# Patient Record
Sex: FEMALE | Race: BLACK OR AFRICAN AMERICAN | NOT HISPANIC OR LATINO | Employment: UNEMPLOYED | URBAN - METROPOLITAN AREA
[De-identification: names, ages, dates, MRNs, and addresses within clinical notes are randomized per-mention and may not be internally consistent; named-entity substitution may affect disease eponyms.]

---

## 2020-11-19 PROCEDURE — 99285 EMERGENCY DEPT VISIT HI MDM: CPT | Mod: 25 | Performed by: EMERGENCY MEDICINE

## 2020-11-20 ENCOUNTER — APPOINTMENT (OUTPATIENT)
Dept: GENERAL RADIOLOGY | Facility: CLINIC | Age: 30
End: 2020-11-20
Attending: EMERGENCY MEDICINE
Payer: MEDICARE

## 2020-11-20 ENCOUNTER — HOSPITAL ENCOUNTER (EMERGENCY)
Facility: CLINIC | Age: 30
Discharge: HOME | End: 2020-11-20
Attending: EMERGENCY MEDICINE | Admitting: EMERGENCY MEDICINE
Payer: MEDICARE

## 2020-11-20 ENCOUNTER — APPOINTMENT (OUTPATIENT)
Dept: CT IMAGING | Facility: CLINIC | Age: 30
End: 2020-11-20
Attending: EMERGENCY MEDICINE
Payer: MEDICARE

## 2020-11-20 ENCOUNTER — TRANSCRIBE ORDERS (OUTPATIENT)
Facility: CLINIC | Age: 30
End: 2020-11-20

## 2020-11-20 ENCOUNTER — PATIENT OUTREACH (OUTPATIENT)
Dept: ONCOLOGY | Facility: CLINIC | Age: 30
End: 2020-11-20

## 2020-11-20 VITALS
SYSTOLIC BLOOD PRESSURE: 130 MMHG | WEIGHT: 170 LBS | HEART RATE: 70 BPM | TEMPERATURE: 97.3 F | OXYGEN SATURATION: 98 % | DIASTOLIC BLOOD PRESSURE: 79 MMHG | RESPIRATION RATE: 18 BRPM

## 2020-11-20 DIAGNOSIS — R59.0 MEDIASTINAL LYMPHADENOPATHY: Primary | ICD-10-CM

## 2020-11-20 DIAGNOSIS — R05.9 COUGH: ICD-10-CM

## 2020-11-20 DIAGNOSIS — R93.89 ABNORMAL CHEST CT: Primary | ICD-10-CM

## 2020-11-20 DIAGNOSIS — Z32.01 PREGNANCY TEST POSITIVE: ICD-10-CM

## 2020-11-20 LAB
ALBUMIN SERPL-MCNC: 3.7 G/DL (ref 3.4–5)
ALP SERPL-CCNC: 65 U/L (ref 40–150)
ALT SERPL W P-5'-P-CCNC: 24 U/L (ref 0–50)
ANION GAP SERPL CALCULATED.3IONS-SCNC: 8 MMOL/L (ref 3–14)
AST SERPL W P-5'-P-CCNC: 12 U/L (ref 0–45)
B-HCG SERPL-ACNC: 957 IU/L (ref 0–5)
BASOPHILS # BLD AUTO: 0.1 10E9/L (ref 0–0.2)
BASOPHILS NFR BLD AUTO: 0.9 %
BILIRUB SERPL-MCNC: 0.3 MG/DL (ref 0.2–1.3)
BUN SERPL-MCNC: 10 MG/DL (ref 7–30)
CALCIUM SERPL-MCNC: 8.8 MG/DL (ref 8.5–10.1)
CHLORIDE SERPL-SCNC: 105 MMOL/L (ref 94–109)
CO2 SERPL-SCNC: 24 MMOL/L (ref 20–32)
CREAT SERPL-MCNC: 0.72 MG/DL (ref 0.52–1.04)
DIFFERENTIAL METHOD BLD: ABNORMAL
EOSINOPHIL # BLD AUTO: 0.4 10E9/L (ref 0–0.7)
EOSINOPHIL NFR BLD AUTO: 7.6 %
ERYTHROCYTE [DISTWIDTH] IN BLOOD BY AUTOMATED COUNT: 15 % (ref 10–15)
GFR SERPL CREATININE-BSD FRML MDRD: >90 ML/MIN/{1.73_M2}
GLUCOSE SERPL-MCNC: 85 MG/DL (ref 70–99)
HCG SERPL QL: POSITIVE
HCT VFR BLD AUTO: 36.8 % (ref 35–47)
HGB BLD-MCNC: 11.6 G/DL (ref 11.7–15.7)
IMM GRANULOCYTES # BLD: 0 10E9/L (ref 0–0.4)
IMM GRANULOCYTES NFR BLD: 0.2 %
INTERPRETATION ECG - MUSE: NORMAL
LYMPHOCYTES # BLD AUTO: 2 10E9/L (ref 0.8–5.3)
LYMPHOCYTES NFR BLD AUTO: 36.6 %
MCH RBC QN AUTO: 28 PG (ref 26.5–33)
MCHC RBC AUTO-ENTMCNC: 31.5 G/DL (ref 31.5–36.5)
MCV RBC AUTO: 89 FL (ref 78–100)
MONOCYTES # BLD AUTO: 0.5 10E9/L (ref 0–1.3)
MONOCYTES NFR BLD AUTO: 8.5 %
NEUTROPHILS # BLD AUTO: 2.6 10E9/L (ref 1.6–8.3)
NEUTROPHILS NFR BLD AUTO: 46.2 %
NRBC # BLD AUTO: 0 10*3/UL
NRBC BLD AUTO-RTO: 0 /100
PLATELET # BLD AUTO: 404 10E9/L (ref 150–450)
POTASSIUM SERPL-SCNC: 3.6 MMOL/L (ref 3.4–5.3)
PROT SERPL-MCNC: 7.4 G/DL (ref 6.8–8.8)
RBC # BLD AUTO: 4.15 10E12/L (ref 3.8–5.2)
SODIUM SERPL-SCNC: 136 MMOL/L (ref 133–144)
WBC # BLD AUTO: 5.5 10E9/L (ref 4–11)

## 2020-11-20 PROCEDURE — 80053 COMPREHEN METABOLIC PANEL: CPT | Performed by: EMERGENCY MEDICINE

## 2020-11-20 PROCEDURE — 250N000011 HC RX IP 250 OP 636: Performed by: EMERGENCY MEDICINE

## 2020-11-20 PROCEDURE — 71275 CT ANGIOGRAPHY CHEST: CPT | Mod: 26 | Performed by: RADIOLOGY

## 2020-11-20 PROCEDURE — 250N000009 HC RX 250: Performed by: EMERGENCY MEDICINE

## 2020-11-20 PROCEDURE — 71045 X-RAY EXAM CHEST 1 VIEW: CPT

## 2020-11-20 PROCEDURE — 84703 CHORIONIC GONADOTROPIN ASSAY: CPT | Performed by: EMERGENCY MEDICINE

## 2020-11-20 PROCEDURE — 71045 X-RAY EXAM CHEST 1 VIEW: CPT | Mod: 26 | Performed by: RADIOLOGY

## 2020-11-20 PROCEDURE — 85025 COMPLETE CBC W/AUTO DIFF WBC: CPT | Performed by: EMERGENCY MEDICINE

## 2020-11-20 PROCEDURE — 99284 EMERGENCY DEPT VISIT MOD MDM: CPT | Performed by: EMERGENCY MEDICINE

## 2020-11-20 PROCEDURE — 71275 CT ANGIOGRAPHY CHEST: CPT

## 2020-11-20 PROCEDURE — 84702 CHORIONIC GONADOTROPIN TEST: CPT | Performed by: EMERGENCY MEDICINE

## 2020-11-20 PROCEDURE — 93005 ELECTROCARDIOGRAM TRACING: CPT | Performed by: EMERGENCY MEDICINE

## 2020-11-20 RX ORDER — BENZONATATE 200 MG/1
200 CAPSULE ORAL 3 TIMES DAILY PRN
Qty: 21 CAPSULE | Refills: 0 | Status: ON HOLD | OUTPATIENT
Start: 2020-11-20 | End: 2020-11-25

## 2020-11-20 RX ORDER — IOPAMIDOL 755 MG/ML
70 INJECTION, SOLUTION INTRAVASCULAR ONCE
Status: COMPLETED | OUTPATIENT
Start: 2020-11-20 | End: 2020-11-20

## 2020-11-20 RX ORDER — ALBUTEROL SULFATE 90 UG/1
2 AEROSOL, METERED RESPIRATORY (INHALATION) EVERY 6 HOURS PRN
Qty: 1 INHALER | Refills: 0 | Status: SHIPPED | OUTPATIENT
Start: 2020-11-20

## 2020-11-20 RX ADMIN — SODIUM CHLORIDE, PRESERVATIVE FREE 90 ML: 5 INJECTION INTRAVENOUS at 04:16

## 2020-11-20 RX ADMIN — IOPAMIDOL 70 ML: 755 INJECTION, SOLUTION INTRAVENOUS at 04:16

## 2020-11-20 ASSESSMENT — ENCOUNTER SYMPTOMS
VOMITING: 0
FEVER: 0
CHEST TIGHTNESS: 0
COUGH: 1
DYSURIA: 0
DIARRHEA: 0
WEAKNESS: 0
RHINORRHEA: 0
SHORTNESS OF BREATH: 0
BRUISES/BLEEDS EASILY: 0
BACK PAIN: 0
CHILLS: 0
NAUSEA: 0
CONFUSION: 0

## 2020-11-20 NOTE — ED PROVIDER NOTES
"    [place] EMERGENCY DEPARTMENT ([facility])  November 20, 2020 Vertical Triage A 12:53 AM   History     Chief Complaint   Patient presents with     Shortness of Breath     The history is provided by the patient and medical records.     Juventino Dickerson is a 29 year old female who presents with recurrent cough since 11/1/20. The cough is occasionally productive of saliva but no thick sputum. She has shortness of breath while having coughing fits, no shortness of breath at rest. No fevers or chills. She has chest pain with long coughing fits, none at rest. No nausea, vomiting, diarrhea. No abdominal pain. She has had rhinorrhea and sneezing. No COVID-19 testing. She denies any acute concerns. She has tried [place] cough drops, and over-the-counter cough medications such as DayQuil and Nyquil without relief.  This cough seems different than her typical cough and that it has been lasting for such a long time. She is a smoker. No cocaine, marijuana or meth use. She smokes 3 packs a day, has always smoked this heavily. She has been trying to stay indoors and has been quarantining for the past 18 days but continues to have symptoms without improvement.    Patient states that she was seen at [facility] for this cough, was given a \"spray\" for her mouth which helped her symptoms temporarily but no medications were prescribed at discharge.  Attempt to obtain [facility] records was unsuccessful, not just for [facility] but anything, [facility], [facility], even [place] or [facility].  Attempted to contact [facility]'s after hours medical records line to obtain care your ID number, they were not open.      PAST MEDICAL HISTORY: History reviewed. No pertinent past medical history.    PAST SURGICAL HISTORY: History reviewed. No pertinent surgical history.    Past medical history, past surgical history, medications, and allergies were reviewed with the patient. Additional pertinent items: None    FAMILY " HISTORY: No family history on file.    SOCIAL HISTORY:   Social History     Tobacco Use     Smoking status: Not on file   Substance Use Topics     Alcohol use: Not on file     Social history was reviewed with the patient. Additional pertinent items: None      Patient's Medications    No medications on file        No Known Allergies     Review of Systems   Constitutional: Negative for chills and fever.   HENT: Negative for rhinorrhea.    Respiratory: Positive for cough. Negative for chest tightness and shortness of breath (No shortness of breath at rest).    Cardiovascular: Negative for chest pain.   Gastrointestinal: Negative for diarrhea, nausea and vomiting.   Genitourinary: Negative for dysuria.   Musculoskeletal: Negative for back pain.   Allergic/Immunologic: Negative for immunocompromised state.   Neurological: Negative for weakness.   Hematological: Does not bruise/bleed easily.   Psychiatric/Behavioral: Negative for confusion.   All other systems reviewed and are negative.    Physical Exam   BP: 130/79  Pulse: 70  Temp: 97.3  F (36.3  C)  Resp: 16  Weight: 77.1 kg (170 lb)  SpO2: 98 %      Physical Exam  General: Afebrile, no acute distress   HEENT: Normocephalic, atraumatic, conjunctivae normal. MMM  Neck: non-tender, supple  Cardio: regular rate. regular rhythm   Resp: Normal work of breathing, no respiratory distress, lungs clear bilaterally, no wheezing, rhonchi, rales  Chest/Back: no visual signs of trauma, no CVA tenderness   Abdomen: soft, non distension, no tenderness, no peritoneal signs   Neuro: alert and fully oriented. CN II-XII grossly intact. Grossly normal strength and sensation in all extremities.   MSK: no deformities. Normal range of motion  Integumentary/Skin: no rash visualized, normal color  Psych: normal affect, normal behavior    ED Course        Procedures    No results found for this or any previous visit (from the past 24 hour(s)).  Medications - No data to display     Assessments &  Plan (with Medical Decision Making)     Juventino Dickerson is a 29 year old female who presents with persistent cough x nearly 3 weeks' duration.  On arrival patient is well-appearing, afebrile, no distress.  Lungs clear auscultation bilaterally.  Patient is nontachycardic, no hypoxia, no respiratory distress.  Patient reports mild chest pain and shortness of breath that is associated with long coughing episodes.    Differential diagnosis includes but is not limited to bronchitis versus pneumonia versus viral illness versus COVID-19 versus chronic cough versus influenza among others.    At this time will obtain chest x-ray, Covid swab, and reevaluate.   Patient does not want to have a COVID test.  Patient states that she has self quarantine for the past 19 days.  I reviewed CXR which demonstrates Indeterminate fullness in the hilar regions bilaterally. Recommend follow-up chest CT to exclude underlying mass or adenopathy. Will obtain CT scan. Patient signed out to morning provider pending CT chest.     I have reviewed the nursing notes.    I have reviewed the findings, diagnosis, plan and need for follow up with the patient.    New Prescriptions    No medications on file       Final diagnoses:   Cough     ZAHEER, Selena Lockwood, am serving as a trained medical scribe to document services personally performed by Dyan Yen MD based on the provider's statements to me on November 20, 2020.  This document has been checked and approved by the attending provider.    IDyan MD, was physically present and have reviewed and verified the accuracy of this note documented by Selena Lockwood medical scribe.     11/19/2020   [facility] EMERGENCY DEPARTMENT     Dyan Yen MD  11/20/20 0149

## 2020-11-20 NOTE — ED TRIAGE NOTES
"Patient awake and alert. Respirations even and unlabored. Skin warm and dry. Reports shortness of breath when having \"coughing spells\" since 11-1, seen at [facility] 4 days ago given a \"spray in the ER which helped for a little bit\". No acute distress noted at this time. Patient reports having chest pain during coughing spells.  "

## 2020-11-20 NOTE — PROGRESS NOTES
New Patient Oncology Nurse Navigator Note     Referring provider:   Rafita Bojorquez DO [facility]  [facility] EMERGENCY DEPT     Referral Placed: November 20, 2020     Evaluation for : R59.0 (ICD-10-CM) - Mediastinal lymphadenopathy     Clinical History (per Nurse review of records provided):    See ED visit 11/19 CT chest    Clinical Assessment / Barriers to Care (Per Nurse):  OUTGOING CALLS to pt:  Introduced my role as nurse navigator with [facility] and that we have recd the referral for dx of abnormal CT findings from  in ED  Explained to pt that she will receive a call from our scheduling intake team and provided our call-back number below if needed.   Juventino live with her 2 kids, 2 yr old and 8 yr old. Relying on mom for emotional and logistical support right now, okays us discussing PHI with her mother, Odalys Beckwith [phone]. Demographics updated.  I advised pt and her mother that I have reviewed her imaging and the ED notes.  She was not given anything for her cough (tessalon perles and inhaler prescriptions printed but not given to pt) and asks what to take for this  We discussed that there is no diagnosis yet, that CT looks suspicious for lymphoma and in addition to biopsy a complete work up usually includes lab, imaging and other testing in order to make a dx and guide tx.  We also discussed that lymphoma is treatable and in many cases curable. Juventino is scared.  I advised if her cough or sx worsen to go back to ED and I am working on oupt consult date/time asap  Pt voiced understanding of above instructions and information and denied further questions    Records Location: [facility]     Referral updates and Plan:   November 20, 2020 calls to pt.  - above  [phone] called Juventino back to let her know that I have reviewd her case with Dr Stout who recommend covid test asap as well as OB appt. Jolene thinks she is 4 weeks pregnant. She states she has  been at home for 20 days and questions need for covid testing. I explained it will   Symptomatic (cough) covid test ordered per Dr Stout and pt notified to schedule asap by calling [phone]. Pt states she will schedule covid test and an appt with her OB asap. She voiced understanding of above instructions and information and denied further questions    Becka Felton, RN, BSN, OCN  RN Care Coordinator / Oncology Nurse Navigator   [facility]  [phone]

## 2020-11-20 NOTE — DISCHARGE INSTRUCTIONS
Your chest CT showed enlarged lymph nodes.  This could represent lymphoma which is a type of a cancer.  A referral for an oncology doctor has made and made for you.  They should contact you to set up an appointment.  If you do not hear from them please contact them to set up your own appointment at  Please make an appointment to follow up with Hematology Oncology Clinic (phone: [phone]) as soon as possible even if entirely better.    Please make an appointment to follow up with OB/Gyn [facility] (phone: [phone]) as soon as possible

## 2020-11-20 NOTE — ED NOTES
Discussed risks and benefits of CT scan while pregnant with the patient.  As there is suspicion of possible mediastinal mass on chest x-ray patient did consent to CT scan.     Rafita Bojorquez,   11/20/20 0346

## 2020-11-20 NOTE — ED NOTES
Emergency Department Patient Sign-out       Brief HPI:  This is a 29 year old female signed out to me by Dr. Yara Yen.  See initial ED Provider note for details of the presentation.         Exam:   Patient Vitals for the past 24 hrs:   BP Temp Temp src Pulse Resp SpO2 Weight   11/19/20 2359 -- -- -- -- -- -- 77.1 kg (170 lb)   11/19/20 2357 130/79 97.3  F (36.3  C) Temporal 70 16 98 % --           ED RESULTS:   Results for orders placed or performed during the hospital encounter of 11/20/20 (from the past 24 hour(s))   EKG 12 lead     Status: None (Preliminary result)    Collection Time: 11/20/20  1:03 AM   Result Value Ref Range    Interpretation ECG Click View Image link to view waveform and result    XR Chest Port 1 View     Status: None    Collection Time: 11/20/20  1:29 AM    Narrative    EXAM: XR CHEST PORT 1 VW  LOCATION: Health system  DATE/TIME: 11/20/2020 1:24 AM    INDICATION: Shortness of breath.    COMPARISON: None.    FINDINGS: Prominence in the hilar regions bilaterally. This could be due to pulmonary arterial enlargement, however underlying lesion or adenopathy not excluded. Given this appearance, CT is recommended for further evaluation. Heart size within normal   limits. No acute infiltrates or consolidation. No significant bony abnormalities.      Impression    IMPRESSION: Indeterminate fullness in the hilar regions bilaterally. Recommend follow-up chest CT to exclude underlying mass or adenopathy.   CBC with platelets differential     Status: Abnormal    Collection Time: 11/20/20  1:56 AM   Result Value Ref Range    WBC 5.5 4.0 - 11.0 10e9/L    RBC Count 4.15 3.8 - 5.2 10e12/L    Hemoglobin 11.6 (L) 11.7 - 15.7 g/dL    Hematocrit 36.8 35.0 - 47.0 %    MCV 89 78 - 100 fl    MCH 28.0 26.5 - 33.0 pg    MCHC 31.5 31.5 - 36.5 g/dL    RDW 15.0 10.0 - 15.0 %    Platelet Count 404 150 - 450 10e9/L    Diff Method Automated Method     % Neutrophils 46.2 %    % Lymphocytes 36.6 %     % Monocytes 8.5 %    % Eosinophils 7.6 %    % Basophils 0.9 %    % Immature Granulocytes 0.2 %    Nucleated RBCs 0 0 /100    Absolute Neutrophil 2.6 1.6 - 8.3 10e9/L    Absolute Lymphocytes 2.0 0.8 - 5.3 10e9/L    Absolute Monocytes 0.5 0.0 - 1.3 10e9/L    Absolute Eosinophils 0.4 0.0 - 0.7 10e9/L    Absolute Basophils 0.1 0.0 - 0.2 10e9/L    Abs Immature Granulocytes 0.0 0 - 0.4 10e9/L    Absolute Nucleated RBC 0.0    Comprehensive metabolic panel     Status: None    Collection Time: 11/20/20  1:56 AM   Result Value Ref Range    Sodium 136 133 - 144 mmol/L    Potassium 3.6 3.4 - 5.3 mmol/L    Chloride 105 94 - 109 mmol/L    Carbon Dioxide 24 20 - 32 mmol/L    Anion Gap 8 3 - 14 mmol/L    Glucose 85 70 - 99 mg/dL    Urea Nitrogen 10 7 - 30 mg/dL    Creatinine 0.72 0.52 - 1.04 mg/dL    GFR Estimate >90 >60 mL/min/[1.73_m2]    GFR Estimate If Black >90 >60 mL/min/[1.73_m2]    Calcium 8.8 8.5 - 10.1 mg/dL    Bilirubin Total 0.3 0.2 - 1.3 mg/dL    Albumin 3.7 3.4 - 5.0 g/dL    Protein Total 7.4 6.8 - 8.8 g/dL    Alkaline Phosphatase 65 40 - 150 U/L    ALT 24 0 - 50 U/L    AST 12 0 - 45 U/L   HCG qualitative pregnancy (blood)     Status: Abnormal    Collection Time: 11/20/20  1:56 AM   Result Value Ref Range    HCG Qualitative Serum Positive (A) NEG^Negative   HCG quantitative pregnancy     Status: Abnormal    Collection Time: 11/20/20  1:56 AM   Result Value Ref Range    HCG Quantitative Serum 957 (H) 0 - 5 IU/L   CT Chest Pulmonary Embolism w Contrast     Status: None    Collection Time: 11/20/20  4:18 AM    Narrative    EXAM: CT CHEST PULMONARY EMBOLISM W CONTRAST  LOCATION: [facility]  DATE/TIME: 11/20/2020 4:13 AM    INDICATION: Shortness of breath.    COMPARISON: None.    TECHNIQUE: CT chest pulmonary angiogram during arterial phase injection of IV contrast. Multiplanar reformats and MIP reconstructions were performed. Dose reduction techniques were used.     CONTRAST: 70 mL Isovue 370.      FINDINGS:  ANGIOGRAM CHEST: Pulmonary arteries are normal caliber and negative for pulmonary emboli. Thoracic aorta is negative for dissection. The heart size is normal.    LUNGS AND PLEURA: Normal.    MEDIASTINUM/AXILLAE: There are multiple enlarged mediastinal and bilateral hilar lymph nodes. A precarinal lymph node mass measures approximately 3.3 x 3.8 cm. The superior vena cava is compressed anteriorly, but remains patent. Bilateral main bronchi   are encased by lymph node disease. There is no axillary lymph node enlargement. The heart size is normal.    UPPER ABDOMEN: No acute upper abdominal abnormality.    MUSCULOSKELETAL: Normal.      Impression    IMPRESSION:  1.  There is no pulmonary embolus, aortic aneurysm or dissection.    2.  Multiple enlarged mediastinal and bilateral hilar lymph nodes. Lymphoma is a possibility.         ED MEDICATIONS:   Medications   iopamidol (ISOVUE-370) solution 70 mL (70 mLs Intravenous Given 11/20/20 0416)   sodium chloride 0.9 % bag 500mL for CT scan flush use (90 mLs Intravenous Given 11/20/20 0416)     Medical decision making: Patient was signed out to me pending chest x-ray.  Chest x-ray was suspicious for enlarged mediastinum.  CT scan of the patient's chest showed mediastinal lymphadenopathy which could represent lymphoma.  I discussed this with heme-onc fellow.  Did not recommend any further testing at this time but since the patient was stable they recommended a referral for outpatient heme-onc appointment be made for further work-up of this and assessment of possible lymphoma.  Patient is also pregnant.  She is experiencing no abdominal discomfort.  Patient was given the number for OB/GYN so she may set up her home prenatal visit.  Discussed the patient's care and the plan with the patient as well as her mother.  All questions were answered and she understands the plan.    Impression:    ICD-10-CM    1. Mediastinal lymphadenopathy  R59.0 Oncology/Hematology Adult  Referral   2. Cough  R05 CBC with platelets differential     Comprehensive metabolic panel     HCG qualitative pregnancy (blood)     HCG quantitative pregnancy     HCG quantitative pregnancy     CANCELED: HCG quantitative pregnancy   3. Pregnancy test positive  Z32.01        Disposition: Discharge    DO Reno Johnson, Rafita Alan DO  11/20/20 0456

## 2020-11-20 NOTE — ED AVS SNAPSHOT
[facility] Emergency Department  [address] [address]  Phone: [phone]                                    Juventino Dickerson   MRN: [id number]    Department: [facility] Emergency Department   Date of Visit: 11/19/2020           After Visit Summary Signature Page    I have received my discharge instructions, and my questions have been answered. I have discussed any challenges I see with this plan with the nurse or doctor.    ..........................................................................................................................................  Patient/Patient Representative Signature      ..........................................................................................................................................  Patient Representative Print Name and Relationship to Patient    ..................................................               ................................................  Date                                   Time    ..........................................................................................................................................  Reviewed by Signature/Title    ...................................................              ..............................................  Date                                               Time          22EPIC Rev 08/18

## 2020-11-20 NOTE — TELEPHONE ENCOUNTER
Oncology/Surgical Oncology Referral Request:     Specialty Requested: Medical Oncology     Referring Provider: Rafita Bojorquez DO    Referring Clinic/Organization: [facility]    Records location: [facility]     Requested Provider (if specified): Not Specified

## 2020-11-21 ENCOUNTER — NURSE TRIAGE (OUTPATIENT)
Dept: NURSING | Facility: CLINIC | Age: 30
End: 2020-11-21

## 2020-11-21 NOTE — TELEPHONE ENCOUNTER
Was in ER 11-20-20 and was supposed to get a written prescription for Albuterol Inhaler and Tessalon Pearls, which are listed in patient's AVS.  Patient calling to say she never received the written prescriptions but would like to pick them up    Patient would like to use [facility] in [place], [place] [address]  Patient's prescriptions called in to pharmacy 11-21-20.    If patient's symptoms worsen, will go back in to ER per ER recommendations.    Shawnee Renner RN  [facility]       Additional Information    Caller has medication question only, adult not sick, and triager answers question    Protocols used: MEDICATION QUESTION CALL-A-

## 2020-11-23 ENCOUNTER — PATIENT OUTREACH (OUTPATIENT)
Dept: ONCOLOGY | Facility: CLINIC | Age: 30
End: 2020-11-23

## 2020-11-23 ENCOUNTER — HOSPITAL ENCOUNTER (OUTPATIENT)
Facility: CLINIC | Age: 30
Setting detail: OBSERVATION
Discharge: HOME | End: 2020-11-25
Attending: INTERNAL MEDICINE | Admitting: PHYSICIAN ASSISTANT
Payer: MEDICARE

## 2020-11-23 ENCOUNTER — APPOINTMENT (OUTPATIENT)
Dept: ULTRASOUND IMAGING | Facility: CLINIC | Age: 30
End: 2020-11-23
Attending: PHYSICIAN ASSISTANT
Payer: MEDICARE

## 2020-11-23 DIAGNOSIS — J98.59 MEDIASTINAL MASS: ICD-10-CM

## 2020-11-23 DIAGNOSIS — Z32.01 PREGNANCY TEST POSITIVE: Primary | ICD-10-CM

## 2020-11-23 DIAGNOSIS — R59.0 LYMPHADENOPATHY, MEDIASTINAL: ICD-10-CM

## 2020-11-23 LAB
ALBUMIN SERPL-MCNC: 4 G/DL (ref 3.4–5)
ALP SERPL-CCNC: 75 U/L (ref 40–150)
ALT SERPL W P-5'-P-CCNC: 28 U/L (ref 0–50)
ANION GAP SERPL CALCULATED.3IONS-SCNC: 5 MMOL/L (ref 3–14)
APTT PPP: 33 SEC (ref 22–37)
AST SERPL W P-5'-P-CCNC: 16 U/L (ref 0–45)
B-HCG SERPL-ACNC: 6321 IU/L (ref 0–5)
BASOPHILS # BLD AUTO: 0.1 10E9/L (ref 0–0.2)
BASOPHILS NFR BLD AUTO: 1 %
BILIRUB SERPL-MCNC: 0.5 MG/DL (ref 0.2–1.3)
BUN SERPL-MCNC: 9 MG/DL (ref 7–30)
CALCIUM SERPL-MCNC: 9 MG/DL (ref 8.5–10.1)
CHLORIDE SERPL-SCNC: 107 MMOL/L (ref 94–109)
CO2 SERPL-SCNC: 23 MMOL/L (ref 20–32)
CREAT SERPL-MCNC: 0.73 MG/DL (ref 0.52–1.04)
DIFFERENTIAL METHOD BLD: NORMAL
EOSINOPHIL # BLD AUTO: 0.2 10E9/L (ref 0–0.7)
EOSINOPHIL NFR BLD AUTO: 4 %
ERYTHROCYTE [DISTWIDTH] IN BLOOD BY AUTOMATED COUNT: 14.8 % (ref 10–15)
FIBRINOGEN PPP-MCNC: 456 MG/DL (ref 200–420)
GFR SERPL CREATININE-BSD FRML MDRD: >90 ML/MIN/{1.73_M2}
GLUCOSE SERPL-MCNC: 73 MG/DL (ref 70–99)
HCT VFR BLD AUTO: 39.3 % (ref 35–47)
HGB BLD-MCNC: 12.6 G/DL (ref 11.7–15.7)
IMM GRANULOCYTES # BLD: 0 10E9/L (ref 0–0.4)
IMM GRANULOCYTES NFR BLD: 0.2 %
INR PPP: 1.06 (ref 0.86–1.14)
LABORATORY COMMENT REPORT: NORMAL
LDH SERPL L TO P-CCNC: 188 U/L (ref 81–234)
LYMPHOCYTES # BLD AUTO: 1.4 10E9/L (ref 0.8–5.3)
LYMPHOCYTES NFR BLD AUTO: 26.5 %
MAGNESIUM SERPL-MCNC: 2 MG/DL (ref 1.6–2.3)
MCH RBC QN AUTO: 28.5 PG (ref 26.5–33)
MCHC RBC AUTO-ENTMCNC: 32.1 G/DL (ref 31.5–36.5)
MCV RBC AUTO: 89 FL (ref 78–100)
MONOCYTES # BLD AUTO: 0.5 10E9/L (ref 0–1.3)
MONOCYTES NFR BLD AUTO: 9.6 %
NEUTROPHILS # BLD AUTO: 3.1 10E9/L (ref 1.6–8.3)
NEUTROPHILS NFR BLD AUTO: 58.7 %
NRBC # BLD AUTO: 0 10*3/UL
NRBC BLD AUTO-RTO: 0 /100
PHOSPHATE SERPL-MCNC: 2.1 MG/DL (ref 2.5–4.5)
PHOSPHATE SERPL-MCNC: 2.2 MG/DL (ref 2.5–4.5)
PLATELET # BLD AUTO: 447 10E9/L (ref 150–450)
POTASSIUM SERPL-SCNC: 3.7 MMOL/L (ref 3.4–5.3)
PROT SERPL-MCNC: 8 G/DL (ref 6.8–8.8)
RBC # BLD AUTO: 4.42 10E12/L (ref 3.8–5.2)
SARS-COV-2 RNA SPEC QL NAA+PROBE: NEGATIVE
SARS-COV-2 RNA SPEC QL NAA+PROBE: NORMAL
SODIUM SERPL-SCNC: 135 MMOL/L (ref 133–144)
SPECIMEN SOURCE: NORMAL
SPECIMEN SOURCE: NORMAL
URATE SERPL-MCNC: 2.8 MG/DL (ref 2.6–6)
WBC # BLD AUTO: 5.2 10E9/L (ref 4–11)

## 2020-11-23 PROCEDURE — 84550 ASSAY OF BLOOD/URIC ACID: CPT | Performed by: PHYSICIAN ASSISTANT

## 2020-11-23 PROCEDURE — 87340 HEPATITIS B SURFACE AG IA: CPT | Mod: GZ | Performed by: PHYSICIAN ASSISTANT

## 2020-11-23 PROCEDURE — 85384 FIBRINOGEN ACTIVITY: CPT | Performed by: PHYSICIAN ASSISTANT

## 2020-11-23 PROCEDURE — 80053 COMPREHEN METABOLIC PANEL: CPT | Performed by: PHYSICIAN ASSISTANT

## 2020-11-23 PROCEDURE — 85025 COMPLETE CBC W/AUTO DIFF WBC: CPT | Performed by: PHYSICIAN ASSISTANT

## 2020-11-23 PROCEDURE — 120N000002 HC R&B MED SURG/OB UMMC

## 2020-11-23 PROCEDURE — U0003 INFECTIOUS AGENT DETECTION BY NUCLEIC ACID (DNA OR RNA); SEVERE ACUTE RESPIRATORY SYNDROME CORONAVIRUS 2 (SARS-COV-2) (CORONAVIRUS DISEASE [COVID-19]), AMPLIFIED PROBE TECHNIQUE, MAKING USE OF HIGH THROUGHPUT TECHNOLOGIES AS DESCRIBED BY CMS-2020-01-R: HCPCS | Performed by: PHYSICIAN ASSISTANT

## 2020-11-23 PROCEDURE — 86706 HEP B SURFACE ANTIBODY: CPT | Performed by: PHYSICIAN ASSISTANT

## 2020-11-23 PROCEDURE — 999N000127 HC STATISTIC PERIPHERAL IV START W US GUIDANCE

## 2020-11-23 PROCEDURE — 84100 ASSAY OF PHOSPHORUS: CPT | Performed by: INTERNAL MEDICINE

## 2020-11-23 PROCEDURE — 76801 OB US < 14 WKS SINGLE FETUS: CPT | Mod: 26 | Performed by: RADIOLOGY

## 2020-11-23 PROCEDURE — 258N000003 HC RX IP 258 OP 636: Performed by: INTERNAL MEDICINE

## 2020-11-23 PROCEDURE — 36415 COLL VENOUS BLD VENIPUNCTURE: CPT | Performed by: INTERNAL MEDICINE

## 2020-11-23 PROCEDURE — 85730 THROMBOPLASTIN TIME PARTIAL: CPT | Performed by: PHYSICIAN ASSISTANT

## 2020-11-23 PROCEDURE — 76801 OB US < 14 WKS SINGLE FETUS: CPT

## 2020-11-23 PROCEDURE — 84100 ASSAY OF PHOSPHORUS: CPT | Performed by: PHYSICIAN ASSISTANT

## 2020-11-23 PROCEDURE — 84702 CHORIONIC GONADOTROPIN TEST: CPT | Performed by: INTERNAL MEDICINE

## 2020-11-23 PROCEDURE — 86704 HEP B CORE ANTIBODY TOTAL: CPT | Performed by: PHYSICIAN ASSISTANT

## 2020-11-23 PROCEDURE — 250N000013 HC RX MED GY IP 250 OP 250 PS 637: Performed by: PHYSICIAN ASSISTANT

## 2020-11-23 PROCEDURE — 36415 COLL VENOUS BLD VENIPUNCTURE: CPT | Performed by: PHYSICIAN ASSISTANT

## 2020-11-23 PROCEDURE — 87389 HIV-1 AG W/HIV-1&-2 AB AG IA: CPT | Performed by: PHYSICIAN ASSISTANT

## 2020-11-23 PROCEDURE — 86695 HERPES SIMPLEX TYPE 1 TEST: CPT | Performed by: PHYSICIAN ASSISTANT

## 2020-11-23 PROCEDURE — 99252 IP/OBS CONSLTJ NEW/EST SF 35: CPT | Performed by: OBSTETRICS & GYNECOLOGY

## 2020-11-23 PROCEDURE — 86696 HERPES SIMPLEX TYPE 2 TEST: CPT | Performed by: PHYSICIAN ASSISTANT

## 2020-11-23 PROCEDURE — G0378 HOSPITAL OBSERVATION PER HR: HCPCS

## 2020-11-23 PROCEDURE — 83735 ASSAY OF MAGNESIUM: CPT | Performed by: PHYSICIAN ASSISTANT

## 2020-11-23 PROCEDURE — 99219 PR INITIAL OBSERVATION CARE,LEVEL II: CPT | Performed by: INTERNAL MEDICINE

## 2020-11-23 PROCEDURE — 76817 TRANSVAGINAL US OBSTETRIC: CPT | Mod: 26 | Performed by: RADIOLOGY

## 2020-11-23 PROCEDURE — 83615 LACTATE (LD) (LDH) ENZYME: CPT | Performed by: PHYSICIAN ASSISTANT

## 2020-11-23 PROCEDURE — 250N000009 HC RX 250: Performed by: INTERNAL MEDICINE

## 2020-11-23 PROCEDURE — 85610 PROTHROMBIN TIME: CPT | Performed by: PHYSICIAN ASSISTANT

## 2020-11-23 RX ORDER — AMOXICILLIN 250 MG
1 CAPSULE ORAL 2 TIMES DAILY PRN
Status: DISCONTINUED | OUTPATIENT
Start: 2020-11-23 | End: 2020-11-25 | Stop reason: HOSPADM

## 2020-11-23 RX ORDER — BENZONATATE 100 MG/1
200 CAPSULE ORAL 3 TIMES DAILY PRN
Status: DISCONTINUED | OUTPATIENT
Start: 2020-11-23 | End: 2020-11-25 | Stop reason: HOSPADM

## 2020-11-23 RX ORDER — GUAIFENESIN/DEXTROMETHORPHAN 100-10MG/5
5 SYRUP ORAL EVERY 4 HOURS PRN
Status: DISCONTINUED | OUTPATIENT
Start: 2020-11-23 | End: 2020-11-24

## 2020-11-23 RX ORDER — ACETAMINOPHEN 325 MG/1
650 TABLET ORAL EVERY 4 HOURS PRN
Status: DISCONTINUED | OUTPATIENT
Start: 2020-11-23 | End: 2020-11-25 | Stop reason: HOSPADM

## 2020-11-23 RX ORDER — ALBUTEROL SULFATE 90 UG/1
2 AEROSOL, METERED RESPIRATORY (INHALATION) EVERY 6 HOURS PRN
Status: DISCONTINUED | OUTPATIENT
Start: 2020-11-23 | End: 2020-11-25 | Stop reason: HOSPADM

## 2020-11-23 RX ORDER — AMOXICILLIN 250 MG
2 CAPSULE ORAL 2 TIMES DAILY PRN
Status: DISCONTINUED | OUTPATIENT
Start: 2020-11-23 | End: 2020-11-25 | Stop reason: HOSPADM

## 2020-11-23 RX ADMIN — BENZONATATE 200 MG: 100 CAPSULE ORAL at 17:18

## 2020-11-23 RX ADMIN — SODIUM PHOSPHATE, MONOBASIC, MONOHYDRATE 9 MMOL: 276; 142 INJECTION, SOLUTION INTRAVENOUS at 17:18

## 2020-11-23 RX ADMIN — BENZONATATE 200 MG: 100 CAPSULE ORAL at 22:41

## 2020-11-23 ASSESSMENT — ACTIVITIES OF DAILY LIVING (ADL)
ADLS_ACUITY_SCORE: 14
ADLS_ACUITY_SCORE: 14

## 2020-11-23 ASSESSMENT — MIFFLIN-ST. JEOR: SCORE: 1438.95

## 2020-11-23 NOTE — H&P
[facility]     History and Physical  Hematology / Oncology     Date of Admission:  11/23/2020  Date of Service (when I saw the patient): 11/23/20    Assessment & Plan   Juventino Dickerson is a 29 year old female who presents with no significant past medical history, early term pregnancy, and nicotine use (3 ppd). Admitted for expedited work up of mediastinal mass.      ONC   # Mediastinal mass   # Cough - improving   Patient was seen in the ED on 11/20/20 for complaint of recurrent cough and SOB since 11/1/20. In the ED Chest X-ray 11/20 showed prominence in hilar regions bilaterally. CT was recommended for further evaluation. CT Chest 11/20 showed no PE, AA or dissection. Multiple enlarged mediastinal and bilateral hilar lymph nodes. Concern for lymphoma. She was direct admitted to the hospital 11/23/20 for expedited work up. COVID neg on admission.    - US of neck today, NPO at [place] for abdominal ultrasound to assess extent of disease.   - Working to arrange biopsy of mediastinal mass.   - Intervention Pulm consulted, appreciate recs.   - Ordered Hepatitis B labs, HSV 1/2, HIV - pending   - TLS/DIC daily    OBYGN   # Early-Term Pregnancy   Patient was found to be pregnant when she was seen in the ED on 11/20. HCG Quant 11/20 957. Per patient she is 5 week pregnant.  - OB consulted, appreciate recs. Recheck quantitative HCG 6321 on 11/23.   - Transvaginal ultrasound assess viability of pregnancy    MISC   # Nicotine Use   Patient reports 3 ppd smoking history since the age of 20 yo. She quit 11/1/20 because it was making her cough worse  - Patient declines nicotine replacement at this time.     FEN:  -Encourage PO fluids   -PRN lyte replacement   - # Hypophosphatemia. Phos 2.2 on admission, replace per protocol  -RDAT - NPO at [place] for Abdominal US and possible biopsy.     Prophy/Misc:  -VTE: Hold ppx Lovenox in anticipation for biopsy   -GI/PUD: None   -Bowels: PRN Senna    -Social: Updated patient's mom over the phone. She has 2 children, ages 2 and 9. Her mom is taking care of her kids while she is inpt.   This patient was discussed with Dr. Leatha Shelton PA-C  Hematology/Oncology  Pager # [phone]  Phone # [phone]     Code Status   Full Code - confirmed with patient on admission     Primary Care Physician   Physician No Ref-Primary    Chief Complaint   Admission for work up of mediastinal mass.     History is obtained from the patient    History of Present Illness   Juventino Dickerson is a 29 year old female who presents with new mediastinal and hilar lymphadenopathy. She started having a frequent hacking dry cough ~ 11/1/20. She had episodes of post-tussive emesis and instances where she was out of breath from coughing so hard. She said she tried everything to try to get rid of the cough but nothing was working. She eventually stopped smoking because it exacerbated her cough. Her cough is now very mild and intermittent, nearly resolved. Endorses mild musculoskeletal right upper chest discomfort, worse with movement and coughing. Not reproducible to palpation. This seems to be resolving as well.   She denies shortness of breath, nasal congestion, or postnasal drip. No recent headaches, vision changes, light-headedness, or dizziness. For the past week she has woken up with drenching night sweats- will sweat through her shirt. Also feeling less hungry, gets full after 1 meal which is new for her. She recalls having the night sweats with previous pregnancies, but not early satiety. She denies weight loss.   Per Juventino, she thinks she is 5 weeks pregnant. She has a 2 and 9 year old at home.   A comprehensive review of systems was obtained and is negative other than noted here or in the HPI.      Past Medical History    I have reviewed this patient's medical history and updated it with pertinent information if needed.   No past medical history on file.    Past Surgical  History   I have reviewed this patient's surgical history and updated it with pertinent information if needed.  No past surgical history on file.    Prior to Admission Medications   Prior to Admission Medications   Prescriptions Last Dose Informant Patient Reported? Taking?   albuterol (PROAIR HFA/PROVENTIL HFA/VENTOLIN HFA) 108 (90 Base) MCG/ACT inhaler 11/22/2020 at Unknown time  No Yes   Sig: Inhale 2 puffs into the lungs every 6 hours as needed for shortness of breath / dyspnea or wheezing   benzonatate (TESSALON) 200 MG capsule Unknown at Unknown time  No No   Sig: Take 1 capsule (200 mg) by mouth 3 times daily as needed for cough      Facility-Administered Medications: None     Allergies   No Known Allergies    Social History   I have reviewed this patient's social history and updated it with pertinent information if needed. Juventino Dickerson      Family History   I have reviewed this patient's family history and updated it with pertinent information if needed.   No family history on file.    Review of Systems   A comprehensive review of symptoms was obtained and negative unless noted above.    Physical Exam   Temp: 100.4  F (38  C) Temp src: Oral BP: (!) 148/55 Pulse: 73   Resp: 18 SpO2: 100 % O2 Device: None (Room air)    Vital Signs with Ranges  Temp:  [100.4  F (38  C)] 100.4  F (38  C)  Pulse:  [73] 73  Resp:  [18] 18  BP: (148)/(55) 148/55  SpO2:  [100 %] 100 %  170 lbs 1.6 oz    Constitutional: Pleasant young female seen resting comfortably in bed in NAD. Alert and interactive.   HEENT: NCAT. PERRL, EOMI, anicteric sclera. Oral mucosa pink and moist with no lesions or thrush.  Hematologic / Lymphatic: No overt bleeding. No cervical or clavicular adenopathy.  Respiratory: Non-labored breathing, good air exchange, lungs clear to auscultation bilaterally. No cough or wheeze noted.   Cardiovascular: Regular rate and rhythm. No murmur or rub.   GI: Normoactive bowel sounds. Abdomen soft, non-distended, and  non-tender. No palpable masses or organomegaly.  Skin: Warm and dry. No concerning lesions or rash on exposed surfaces.  Musculoskeletal: Extremities grossly normal, non-tender, no edema. Strong peripheral pulses. Good strength and ROM in bed.   Neurologic: A&O x 3, CNs 2-12 grossly intact, speech normal, gait normal, sensation to light touch grossly WNL. Grossly non-focal.  Neuropsychiatric: Mentation and affect appear normal/appropriate.      Data   Results for orders placed or performed during the hospital encounter of 11/23/20 (from the past 24 hour(s))   Asymptomatic COVID-19 Virus (Coronavirus) by PCR    Specimen: Nasopharyngeal   Result Value Ref Range    COVID-19 Virus PCR to [facility] - Source Nasopharyngeal     COVID-19 Virus PCR to [facility] - Result       Test received-See reflex to IDDL test SARS CoV2 (COVID-19) Virus RT-PCR   SARS-CoV-2 COVID-19 Virus (Coronavirus) RT-PCR Nasopharyngeal    Specimen: Nasopharyngeal   Result Value Ref Range    SARS-CoV-2 Virus Specimen Source Nasopharyngeal     SARS-CoV-2 PCR Result NEGATIVE     SARS-CoV-2 PCR Comment       Testing was performed using the Xpert Xpress SARS-CoV-2 Assay on the Cepheid Gene-Xpert   Instrument Systems. Additional information about this Emergency Use Authorization (EUA)   assay can be found via the Lab Guide.     CBC with platelets differential   Result Value Ref Range    WBC 5.2 4.0 - 11.0 10e9/L    RBC Count 4.42 3.8 - 5.2 10e12/L    Hemoglobin 12.6 11.7 - 15.7 g/dL    Hematocrit 39.3 35.0 - 47.0 %    MCV 89 78 - 100 fl    MCH 28.5 26.5 - 33.0 pg    MCHC 32.1 31.5 - 36.5 g/dL    RDW 14.8 10.0 - 15.0 %    Platelet Count 447 150 - 450 10e9/L    Diff Method Automated Method     % Neutrophils 58.7 %    % Lymphocytes 26.5 %    % Monocytes 9.6 %    % Eosinophils 4.0 %    % Basophils 1.0 %    % Immature Granulocytes 0.2 %    Nucleated RBCs 0 0 /100    Absolute Neutrophil 3.1 1.6 - 8.3 10e9/L    Absolute Lymphocytes 1.4 0.8 - 5.3 10e9/L    Absolute  Monocytes 0.5 0.0 - 1.3 10e9/L    Absolute Eosinophils 0.2 0.0 - 0.7 10e9/L    Absolute Basophils 0.1 0.0 - 0.2 10e9/L    Abs Immature Granulocytes 0.0 0 - 0.4 10e9/L    Absolute Nucleated RBC 0.0    Comprehensive metabolic panel   Result Value Ref Range    Sodium 135 133 - 144 mmol/L    Potassium 3.7 3.4 - 5.3 mmol/L    Chloride 107 94 - 109 mmol/L    Carbon Dioxide 23 20 - 32 mmol/L    Anion Gap 5 3 - 14 mmol/L    Glucose 73 70 - 99 mg/dL    Urea Nitrogen 9 7 - 30 mg/dL    Creatinine 0.73 0.52 - 1.04 mg/dL    GFR Estimate >90 >60 mL/min/[1.73_m2]    GFR Estimate If Black >90 >60 mL/min/[1.73_m2]    Calcium 9.0 8.5 - 10.1 mg/dL    Bilirubin Total 0.5 0.2 - 1.3 mg/dL    Albumin 4.0 3.4 - 5.0 g/dL    Protein Total 8.0 6.8 - 8.8 g/dL    Alkaline Phosphatase 75 40 - 150 U/L    ALT 28 0 - 50 U/L    AST 16 0 - 45 U/L   Magnesium   Result Value Ref Range    Magnesium 2.0 1.6 - 2.3 mg/dL   Phosphorus   Result Value Ref Range    Phosphorus 2.2 (L) 2.5 - 4.5 mg/dL   Uric acid   Result Value Ref Range    Uric Acid 2.8 2.6 - 6.0 mg/dL   Lactate Dehydrogenase   Result Value Ref Range    Lactate Dehydrogenase 188 81 - 234 U/L   INR   Result Value Ref Range    INR 1.06 0.86 - 1.14   Fibrinogen activity   Result Value Ref Range    Fibrinogen 456 (H) 200 - 420 mg/dL   Partial thromboplastin time   Result Value Ref Range    PTT 33 22 - 37 sec       I have seen, interviewed, and examined the patient independently.  I have reviewed the vital signs and labs.  This note reflects my assessment and plan.    Juventino is admitted for expedited work up and management of new mediastinal mass with evidence of compression of SVC.     Her cough is worse today, we will give antitussive (that is compatible with pregnancy)    Onc  1. Ultrasound of neck and abd in lieu of staging.  2. Pulm biopsy: IR prefers it done with pulm due to sedation meds required for IR are not safe in pregnancy. OB thinks it is just fine to give her what she needs for IR  biopsy (presumably single exposure is unlikely to cause much harm). We will pursue pulm biopsy and return to IR if pulm biopsy is unsuccessful. This biopsy is very urgent. If she becomes unstable, she will need urgent therapy. SVC syndrome is medical/oncological emergency. However, we can not treat this without knowing what it is. Also, given that the biopsy will be needle (small), it is best not to give steroids prior to biopsy as this can interfere with the interpretation.  3. BMBx (will schedule around pulm biopsy, this is less urgent but can be helpful and important for staging.  4. Currently stable, no evidence of TLS, BM failure, or DIC. Symptoms are mild. No HA, no swelling etc.    OB  approx 5 weeks pregnant, Hcg appropriately elevated suggesting viable pregnancy. Appreciate OB assistance, will monitor closely.      Elenita Zhang MD/PhD

## 2020-11-23 NOTE — CONSULTS
[facility]  Gynecology Consult Note    Reason for Consult: management of early pregnancy in setting of potential new lymphoma diagnosis    Requesting Provider: Asmita Shelton PA-C    SUBJECTIVE    Juventino Dickerson is a 29 year old  at 4w4d by LMP admitted for expedited workup of new chest mass that is concerning for possible lymphoma. Unfortunately, Juventino has had a cough for the last 3 weeks. Was seen in the ED on , and a large chest mass was found. Admission for expedited workup was recommended to determine cause.    Juventino is happy that she is pregnant. She does endorse some nausea but no other early pregnancy symptoms. LMP 10/22/2020.    PAST MEDICAL HISTORY  No past medical history on file.    MEDICATIONS  Current Facility-Administered Medications   Medication     acetaminophen (TYLENOL) tablet 650 mg     albuterol (PROAIR HFA/PROVENTIL HFA/VENTOLIN HFA) 108 (90 Base) MCG/ACT inhaler 2 puff     benzonatate (TESSALON) capsule 200 mg     [Held by provider] enoxaparin ANTICOAGULANT (LOVENOX) injection 40 mg     No rectal suppositories if WBC less than 1000/microliters or platelets less than 50,000/ L     senna-docusate (SENOKOT-S/PERICOLACE) 8.6-50 MG per tablet 1 tablet    Or     senna-docusate (SENOKOT-S/PERICOLACE) 8.6-50 MG per tablet 2 tablet       ALLERGIES  No Known Allergies    OB History    Para Term  AB Living   3 2 2 0 0 2   SAB TAB Ectopic Multiple Live Births   0 0 0 0 2      # Outcome Date GA Lbr Paulino/2nd Weight Sex Delivery Anes PTL Lv   3 Current            2 Term     U Vag-Spont   ALLEGRA   1 Term      Vag-Spont   ALLEGRA      Complications: Shoulder Dystocia, Postpartum hemorrhage       GYN HISTORY    LMP: No LMP recorded.  History of STIs: h/o chlamydia  History of abnormal pap smears: prior LGSIL pap    PAST SURGICAL HISTORY  No past surgical history on file.    SOCIAL HISTORY  Social History     Tobacco Use     Smoking status: Not on file   Substance Use Topics  "    Alcohol use: Not on file     Drug use: Not on file       FAMILY HISTORY  No family history on file.    REVIEW OF SYSTEMS  A 10 point review of systems including Constitutional, Eyes, Respiratory, Cardiovascular, Gastroenterology, Genitourinary, Integumentary, Musculoskeletal, and Psychiatric, were all negative, except for pertinent positives noted in the above HPI.    OBJECTIVE  BP (!) 148/55 (BP Location: Left arm)   Pulse 73   Temp 100.4  F (38  C) (Oral)   Resp 18   Ht 1.549 m (5' 1\")   Wt 77.2 kg (170 lb 1.6 oz)   SpO2 100%   BMI 32.14 kg/m      Examination deferred, patient's history discussed over the phone    LABS:   Component      Latest Ref Rng & Units 2020   WBC      4.0 - 11.0 10e9/L 5.2   RBC Count      3.8 - 5.2 10e12/L 4.42   Hemoglobin      11.7 - 15.7 g/dL 12.6   Hematocrit      35.0 - 47.0 % 39.3   MCV      78 - 100 fl 89   MCH      26.5 - 33.0 pg 28.5   MCHC      31.5 - 36.5 g/dL 32.1   RDW      10.0 - 15.0 % 14.8   Platelet Count      150 - 450 10e9/L 447     Component      Latest Ref Rng & Units 2020   HCG Quantitative Serum      0 - 5 IU/L 957 (H) 6,321 (H)     IMAGING:  Pelvic Ultrasound 20:   Gestational Sac measures 0.5 x 0.6 x 0.8 cm.  IMPRESSION: Gestational sac within the uterus measuring up to 8 mm. No  yolk sac or embryo is visible, which may be due to the early stage of  pregnancy. Recommend continued follow-up with beta hCG. Ultrasound  follow-up in 2 weeks or more will be diagnostic of pregnancy  Viability/failure.    MARGE based on mean sac diameter: 36 days gestation = MARGE 2021    ASSESSMENT  Juventino Dickerson is a 29 year old  at 4w4d by LMP with a pregnancy of unknown location, likely intrauterine pregnancy.    PLAN  -will continue to discuss Juventino's pregnancy intentions with her as her diagnosis and treatment plan evolves, including continuing the pregnancy and/or discussion of termination of pregnancy if this would allow more " aggressive/different treatment options. We are happy to continue to work with the primary team to choose treatments safe in pregnancy unless Kahyla were to choose otherwise.  -at this time, imaging demonstrates a likely intrauterine pregnancy (and though radiology does not see a yolk sac, which would definitively diagnose an IUP, I see one on my personal review of imaging), but ectopic cannot be excluded completely until a yolk sac or fetal pole is identified. HCG trend is also reassuring that this is most likely an intrauterine pregnancy, and I wouldn't perform additional values at this time as it wouldn't further assist in diagnosis of pregnancy viability. Viability would be determined when fetal cardiac activity is present, which may take up to 14 days to develop.  -would recommend a prenatal vitamin be started if she has no other contraindications.    The gynecology service with continue to follow. Will see in person tomorrow.    Thank you for this consult. Please page the Gynecology pager during usual business hours at ([phone]) and after hours at ([phone]) with questions or concerns.    Mariluz Barnard MD, MSCI    [facility]/OBGYN

## 2020-11-23 NOTE — TELEPHONE ENCOUNTER
RECORDS STATUS - ALL OTHER DIAGNOSIS      RECORDS RECEIVED FROM: [facility]   DATE RECEIVED: 11/23/20   NOTES STATUS DETAILS   OFFICE NOTE from referring provider Epic Rafita Bojorquez DO   OFFICE NOTE from medical oncologist     DISCHARGE SUMMARY from hospital [facility] 11/23/20   DISCHARGE REPORT from the ER [facility] 11/20/20   OPERATIVE REPORT     MEDICATION LIST     CLINICAL TRIAL TREATMENTS TO DATE     LABS     PATHOLOGY REPORTS     ANYTHING RELATED TO DIAGNOSIS Epic 11/23/20   GENONOMIC TESTING     TYPE:     IMAGING (NEED IMAGES & REPORT)     XR PACS 11/20/20: Epic   CT SCANS PACS 11/20/20: [facility]   MRI     MAMMO     ULTRASOUND     PET

## 2020-11-23 NOTE — PROGRESS NOTES
Sebastian by phone with Dr Zhang and Asmita ERAZO re: plans for expedited work up of possible lymphoma. Dr Zhang recommends direct admit to 7D today. Bed is ready per Asmita    OUTGOING CALL: Notified Jolene that the recommendation is for her to admitted to [facility] 7D @ [address] today for expedited work up.  Juventino denies UE edema but has started noticing chest discomfort since being told she has something abnormal in her chest. She went for covid test today and went to the wrong address and therefore was not yet tested.  She is approx 15-20 minutes away from [facility] and will drive herself to [facility] now.  I discussed by conference call this information with pt and her mother Odalys. Odalys voices desire to be kept up to date and Juventino and her mother understand that I will let the inpt team know, reassurance re: this provided.  Pt and her mother Odalys voiced understanding of above instructions and information and denied further questions    Report called to Evelin ERAZO at 7D   Direct admit request called to Admissions.    Becka Felton, RN, BSN, OCN  RN Care Coordinator  [facility]  [address] [place] [id number] [phone]

## 2020-11-24 ENCOUNTER — APPOINTMENT (OUTPATIENT)
Dept: ULTRASOUND IMAGING | Facility: CLINIC | Age: 30
End: 2020-11-24
Attending: PHYSICIAN ASSISTANT
Payer: MEDICARE

## 2020-11-24 PROBLEM — R59.0 LYMPHADENOPATHY, MEDIASTINAL: Status: ACTIVE | Noted: 2020-11-24

## 2020-11-24 LAB
ANION GAP SERPL CALCULATED.3IONS-SCNC: 6 MMOL/L (ref 3–14)
BASOPHILS # BLD AUTO: 0.1 10E9/L (ref 0–0.2)
BASOPHILS NFR BLD AUTO: 1 %
BUN SERPL-MCNC: 9 MG/DL (ref 7–30)
CALCIUM SERPL-MCNC: 9 MG/DL (ref 8.5–10.1)
CHLORIDE SERPL-SCNC: 110 MMOL/L (ref 94–109)
CO2 SERPL-SCNC: 20 MMOL/L (ref 20–32)
CREAT SERPL-MCNC: 0.69 MG/DL (ref 0.52–1.04)
DIFFERENTIAL METHOD BLD: NORMAL
EOSINOPHIL # BLD AUTO: 0.4 10E9/L (ref 0–0.7)
EOSINOPHIL NFR BLD AUTO: 7.1 %
ERYTHROCYTE [DISTWIDTH] IN BLOOD BY AUTOMATED COUNT: 14.7 % (ref 10–15)
FIBRINOGEN PPP-MCNC: 444 MG/DL (ref 200–420)
GFR SERPL CREATININE-BSD FRML MDRD: >90 ML/MIN/{1.73_M2}
GLUCOSE SERPL-MCNC: 82 MG/DL (ref 70–99)
HBV CORE AB SERPL QL IA: NONREACTIVE
HBV SURFACE AB SERPL IA-ACNC: 113.99 M[IU]/ML
HBV SURFACE AG SERPL QL IA: NONREACTIVE
HCT VFR BLD AUTO: 36.8 % (ref 35–47)
HGB BLD-MCNC: 11.9 G/DL (ref 11.7–15.7)
HIV 1+2 AB+HIV1 P24 AG SERPL QL IA: NONREACTIVE
HSV1 IGG SERPL QL IA: >8 AI (ref 0–0.8)
HSV2 IGG SERPL QL IA: >8 AI (ref 0–0.8)
IMM GRANULOCYTES # BLD: 0 10E9/L (ref 0–0.4)
IMM GRANULOCYTES NFR BLD: 0.2 %
INR PPP: 1.06 (ref 0.86–1.14)
LDH SERPL L TO P-CCNC: 187 U/L (ref 81–234)
LYMPHOCYTES # BLD AUTO: 1.7 10E9/L (ref 0.8–5.3)
LYMPHOCYTES NFR BLD AUTO: 32.8 %
MAGNESIUM SERPL-MCNC: 1.8 MG/DL (ref 1.6–2.3)
MCH RBC QN AUTO: 28.5 PG (ref 26.5–33)
MCHC RBC AUTO-ENTMCNC: 32.3 G/DL (ref 31.5–36.5)
MCV RBC AUTO: 88 FL (ref 78–100)
MONOCYTES # BLD AUTO: 0.6 10E9/L (ref 0–1.3)
MONOCYTES NFR BLD AUTO: 11.2 %
NEUTROPHILS # BLD AUTO: 2.5 10E9/L (ref 1.6–8.3)
NEUTROPHILS NFR BLD AUTO: 47.7 %
NRBC # BLD AUTO: 0 10*3/UL
NRBC BLD AUTO-RTO: 0 /100
PHOSPHATE SERPL-MCNC: 3 MG/DL (ref 2.5–4.5)
PLATELET # BLD AUTO: 418 10E9/L (ref 150–450)
POTASSIUM SERPL-SCNC: 3.5 MMOL/L (ref 3.4–5.3)
RBC # BLD AUTO: 4.18 10E12/L (ref 3.8–5.2)
SODIUM SERPL-SCNC: 137 MMOL/L (ref 133–144)
URATE SERPL-MCNC: 2.9 MG/DL (ref 2.6–6)
WBC # BLD AUTO: 5.2 10E9/L (ref 4–11)

## 2020-11-24 PROCEDURE — 88305 TISSUE EXAM BY PATHOLOGIST: CPT | Mod: 26 | Performed by: PATHOLOGY

## 2020-11-24 PROCEDURE — 250N000009 HC RX 250: Performed by: INTERNAL MEDICINE

## 2020-11-24 PROCEDURE — 88264 CHROMOSOME ANALYSIS 20-25: CPT | Performed by: PHYSICIAN ASSISTANT

## 2020-11-24 PROCEDURE — 84100 ASSAY OF PHOSPHORUS: CPT | Performed by: PHYSICIAN ASSISTANT

## 2020-11-24 PROCEDURE — 88313 SPECIAL STAINS GROUP 2: CPT | Mod: TC | Performed by: PHYSICIAN ASSISTANT

## 2020-11-24 PROCEDURE — 999N001022 HC STATISTIC H-SPHEME PROCESS B/S: Performed by: PHYSICIAN ASSISTANT

## 2020-11-24 PROCEDURE — 83735 ASSAY OF MAGNESIUM: CPT | Performed by: PHYSICIAN ASSISTANT

## 2020-11-24 PROCEDURE — 85025 COMPLETE CBC W/AUTO DIFF WBC: CPT | Performed by: PHYSICIAN ASSISTANT

## 2020-11-24 PROCEDURE — 84550 ASSAY OF BLOOD/URIC ACID: CPT | Performed by: PHYSICIAN ASSISTANT

## 2020-11-24 PROCEDURE — 88185 FLOWCYTOMETRY/TC ADD-ON: CPT | Mod: TC | Performed by: PHYSICIAN ASSISTANT

## 2020-11-24 PROCEDURE — 85610 PROTHROMBIN TIME: CPT | Performed by: PHYSICIAN ASSISTANT

## 2020-11-24 PROCEDURE — 88313 SPECIAL STAINS GROUP 2: CPT | Mod: 26 | Performed by: PATHOLOGY

## 2020-11-24 PROCEDURE — 83615 LACTATE (LD) (LDH) ENZYME: CPT | Performed by: PHYSICIAN ASSISTANT

## 2020-11-24 PROCEDURE — 99225 PR SUBSEQUENT OBSERVATION CARE,LEVEL II: CPT | Performed by: INTERNAL MEDICINE

## 2020-11-24 PROCEDURE — 88185 FLOWCYTOMETRY/TC ADD-ON: CPT | Mod: TC | Performed by: INTERNAL MEDICINE

## 2020-11-24 PROCEDURE — 88271 CYTOGENETICS DNA PROBE: CPT | Performed by: PHYSICIAN ASSISTANT

## 2020-11-24 PROCEDURE — 85384 FIBRINOGEN ACTIVITY: CPT | Performed by: PHYSICIAN ASSISTANT

## 2020-11-24 PROCEDURE — 88184 FLOWCYTOMETRY/ TC 1 MARKER: CPT | Mod: TC | Performed by: INTERNAL MEDICINE

## 2020-11-24 PROCEDURE — 88341 IMHCHEM/IMCYTCHM EA ADD ANTB: CPT | Mod: 26 | Performed by: PATHOLOGY

## 2020-11-24 PROCEDURE — 88312 SPECIAL STAINS GROUP 1: CPT | Mod: 26 | Performed by: PATHOLOGY

## 2020-11-24 PROCEDURE — 80048 BASIC METABOLIC PNL TOTAL CA: CPT | Performed by: PHYSICIAN ASSISTANT

## 2020-11-24 PROCEDURE — 31653 BRONCH EBUS SAMPLNG 3/> NODE: CPT | Mod: GC | Performed by: INTERNAL MEDICINE

## 2020-11-24 PROCEDURE — 88312 SPECIAL STAINS GROUP 1: CPT | Mod: TC | Performed by: INTERNAL MEDICINE

## 2020-11-24 PROCEDURE — 999N001018 HC STATISTIC H-CELL BLOCK W/STAIN: Performed by: INTERNAL MEDICINE

## 2020-11-24 PROCEDURE — 88173 CYTOPATH EVAL FNA REPORT: CPT | Mod: TC | Performed by: INTERNAL MEDICINE

## 2020-11-24 PROCEDURE — 250N000011 HC RX IP 250 OP 636: Performed by: PHYSICIAN ASSISTANT

## 2020-11-24 PROCEDURE — 99153 MOD SED SAME PHYS/QHP EA: CPT | Performed by: INTERNAL MEDICINE

## 2020-11-24 PROCEDURE — 88237 TISSUE CULTURE BONE MARROW: CPT | Performed by: PHYSICIAN ASSISTANT

## 2020-11-24 PROCEDURE — 250N000013 HC RX MED GY IP 250 OP 250 PS 637: Performed by: PHYSICIAN ASSISTANT

## 2020-11-24 PROCEDURE — 88342 IMHCHEM/IMCYTCHM 1ST ANTB: CPT | Mod: 26 | Performed by: PATHOLOGY

## 2020-11-24 PROCEDURE — 76700 US EXAM ABDOM COMPLETE: CPT

## 2020-11-24 PROCEDURE — 76536 US EXAM OF HEAD AND NECK: CPT | Mod: 26 | Performed by: RADIOLOGY

## 2020-11-24 PROCEDURE — 38222 DX BONE MARROW BX & ASPIR: CPT

## 2020-11-24 PROCEDURE — 250N000011 HC RX IP 250 OP 636: Performed by: INTERNAL MEDICINE

## 2020-11-24 PROCEDURE — 999N001102 HC STATISTIC DNA PROCESS AND HOLD: Performed by: PHYSICIAN ASSISTANT

## 2020-11-24 PROCEDURE — 88305 TISSUE EXAM BY PATHOLOGIST: CPT | Mod: TC | Performed by: INTERNAL MEDICINE

## 2020-11-24 PROCEDURE — G0378 HOSPITAL OBSERVATION PER HR: HCPCS

## 2020-11-24 PROCEDURE — 999N001126 HC STATISTIC BONE MARROW INTERP TC 85097: Performed by: PHYSICIAN ASSISTANT

## 2020-11-24 PROCEDURE — 85097 BONE MARROW INTERPRETATION: CPT | Performed by: PATHOLOGY

## 2020-11-24 PROCEDURE — 999N001086 HC STATISTIC MORPHOLOGY W/INTERP HEMEPATH TC 85060: Performed by: PHYSICIAN ASSISTANT

## 2020-11-24 PROCEDURE — 88172 CYTP DX EVAL FNA 1ST EA SITE: CPT | Mod: 26 | Performed by: PATHOLOGY

## 2020-11-24 PROCEDURE — 88280 CHROMOSOME KARYOTYPE STUDY: CPT | Performed by: PHYSICIAN ASSISTANT

## 2020-11-24 PROCEDURE — 88311 DECALCIFY TISSUE: CPT | Mod: TC | Performed by: PHYSICIAN ASSISTANT

## 2020-11-24 PROCEDURE — 88311 DECALCIFY TISSUE: CPT | Mod: 26 | Performed by: PATHOLOGY

## 2020-11-24 PROCEDURE — 88184 FLOWCYTOMETRY/ TC 1 MARKER: CPT | Mod: TC | Performed by: PHYSICIAN ASSISTANT

## 2020-11-24 PROCEDURE — 88291 CYTO/MOLECULAR REPORT: CPT | Performed by: MEDICAL GENETICS

## 2020-11-24 PROCEDURE — 88305 TISSUE EXAM BY PATHOLOGIST: CPT | Mod: TC | Performed by: PHYSICIAN ASSISTANT

## 2020-11-24 PROCEDURE — 99152 MOD SED SAME PHYS/QHP 5/>YRS: CPT | Performed by: INTERNAL MEDICINE

## 2020-11-24 PROCEDURE — 999N001137 HC STATISTIC FLOW >15 ABY TC 88189: Performed by: INTERNAL MEDICINE

## 2020-11-24 PROCEDURE — 31653 BRONCH EBUS SAMPLNG 3/> NODE: CPT | Performed by: INTERNAL MEDICINE

## 2020-11-24 PROCEDURE — 36415 COLL VENOUS BLD VENIPUNCTURE: CPT | Performed by: PHYSICIAN ASSISTANT

## 2020-11-24 PROCEDURE — 88342 IMHCHEM/IMCYTCHM 1ST ANTB: CPT | Mod: TC,XU | Performed by: PHYSICIAN ASSISTANT

## 2020-11-24 PROCEDURE — 99152 MOD SED SAME PHYS/QHP 5/>YRS: CPT | Mod: GC | Performed by: INTERNAL MEDICINE

## 2020-11-24 PROCEDURE — 76536 US EXAM OF HEAD AND NECK: CPT

## 2020-11-24 PROCEDURE — 88161 CYTOPATH SMEAR OTHER SOURCE: CPT | Mod: TC | Performed by: PHYSICIAN ASSISTANT

## 2020-11-24 PROCEDURE — 76700 US EXAM ABDOM COMPLETE: CPT | Mod: 26 | Performed by: RADIOLOGY

## 2020-11-24 PROCEDURE — 88275 CYTOGENETICS 100-300: CPT | Performed by: PHYSICIAN ASSISTANT

## 2020-11-24 PROCEDURE — 85060 BLOOD SMEAR INTERPRETATION: CPT | Performed by: PATHOLOGY

## 2020-11-24 PROCEDURE — 88341 IMHCHEM/IMCYTCHM EA ADD ANTB: CPT | Mod: TC | Performed by: PHYSICIAN ASSISTANT

## 2020-11-24 PROCEDURE — 999N001137 HC STATISTIC FLOW >15 ABY TC 88189: Performed by: PHYSICIAN ASSISTANT

## 2020-11-24 PROCEDURE — 88189 FLOWCYTOMETRY/READ 16 & >: CPT | Mod: 26 | Performed by: PATHOLOGY

## 2020-11-24 PROCEDURE — 88172 CYTP DX EVAL FNA 1ST EA SITE: CPT | Mod: TC | Performed by: INTERNAL MEDICINE

## 2020-11-24 PROCEDURE — 88173 CYTOPATH EVAL FNA REPORT: CPT | Mod: 26 | Performed by: PATHOLOGY

## 2020-11-24 RX ORDER — FENTANYL CITRATE 50 UG/ML
INJECTION, SOLUTION INTRAMUSCULAR; INTRAVENOUS PRN
Status: DISCONTINUED | OUTPATIENT
Start: 2020-11-24 | End: 2020-11-24 | Stop reason: HOSPADM

## 2020-11-24 RX ORDER — LIDOCAINE HYDROCHLORIDE 20 MG/ML
SOLUTION OROPHARYNGEAL PRN
Status: DISCONTINUED | OUTPATIENT
Start: 2020-11-24 | End: 2020-11-24 | Stop reason: HOSPADM

## 2020-11-24 RX ORDER — LIDOCAINE HYDROCHLORIDE 40 MG/ML
INJECTION, SOLUTION RETROBULBAR PRN
Status: DISCONTINUED | OUTPATIENT
Start: 2020-11-24 | End: 2020-11-24 | Stop reason: HOSPADM

## 2020-11-24 RX ORDER — PRENATAL VIT/IRON FUM/FOLIC AC 27MG-0.8MG
1 TABLET ORAL DAILY
Status: DISCONTINUED | OUTPATIENT
Start: 2020-11-24 | End: 2020-11-25 | Stop reason: HOSPADM

## 2020-11-24 RX ORDER — GUAIFENESIN/DEXTROMETHORPHAN 100-10MG/5
10 SYRUP ORAL EVERY 4 HOURS PRN
Status: DISCONTINUED | OUTPATIENT
Start: 2020-11-24 | End: 2020-11-25 | Stop reason: HOSPADM

## 2020-11-24 RX ADMIN — ACETAMINOPHEN 650 MG: 325 TABLET, FILM COATED ORAL at 16:13

## 2020-11-24 RX ADMIN — MIDAZOLAM 1 MG: 1 INJECTION INTRAMUSCULAR; INTRAVENOUS at 14:30

## 2020-11-24 RX ADMIN — BENZONATATE 200 MG: 100 CAPSULE ORAL at 13:29

## 2020-11-24 RX ADMIN — MIDAZOLAM 1 MG: 1 INJECTION INTRAMUSCULAR; INTRAVENOUS at 14:07

## 2020-11-24 RX ADMIN — GUAIFENESIN AND DEXTROMETHORPHAN 10 ML: 100; 10 SYRUP ORAL at 13:49

## 2020-11-24 ASSESSMENT — ACTIVITIES OF DAILY LIVING (ADL)
ADLS_ACUITY_SCORE: 14

## 2020-11-24 ASSESSMENT — MIFFLIN-ST. JEOR: SCORE: 1415.82

## 2020-11-24 NOTE — OR NURSING
Procedure: Bronchoscopy with EBUS and lymph node biopsy at 3 stations (Station 4, 7, and 11R)  Sedation: Conscious sedation   O2: 4 LPM NC  Tolerated: VS stable during and post procedure. No abd or chest pain noted  Specimens: Specimens collected and taken by pathology  Report: Given to bedside RN  Pt to recovery area in stable condition    Adina Astorga, RN

## 2020-11-24 NOTE — PLAN OF CARE
Pt afebrile with stable vs. NPO for abd and neck US, done. Then had lymph node bx done by pulmonology in endoscopy. Then BmBx done at bedside. Site c/d/I. Vss. Resting s/p procedures.

## 2020-11-24 NOTE — PLAN OF CARE
[id number]  Pt. Afebrile. AVSS on room air. A&Ox4. Denies pain, nausea/dizziness. Does complain of a productive cough- gave tessalon pearls with adequate relief. Up independently in room. Voiding spontaneously. NPO at 0000 for abdominal ultra sound. Also plan to have bone marrow biopsy today at 1300. No acute events. Will continue with poc.

## 2020-11-24 NOTE — PLAN OF CARE
[id number]: AVSS on RA, Tmax 100.4. R shoulder/chest pain comfortably manageable without intervention. Denies SOB. Cough worsening this evening, congested and productive. Tessalon given x1 with some relief. Denies nausea. Phos replaced for 2.2, recheck with AM labs. Neck and transvaginal ultrasound completed. Voiding spontaneously. Up independently. Plan for abdominal ultrasound in AM (NPO at midnight for this), BMBX at 1300, and possible mass biopsy (unscheduled). Discussed plan with pt's mother. Continue with POC.

## 2020-11-24 NOTE — CONSULTS
Interventional Pulmonology          Initial Inpatient Consultation Note                                     November 24, 2020            Patient: Juventino Dickerson    Date of Admission: 11/23/2020  Reason for Consultation: lymphadenopathy  Requesting Physician: Elenita Zhang MD  [address]  [place] [address]      Assessment:   Juventino Dickerson is a 29 year old with history of depression who was admitted on 11/23/2020 for expedited work up for mediastinal and hilar lymphadenopathy. She was found to be about 5 weeks pregnant on admission. Interventional Pulmonology is consulted today for biopsy of lymph nodes.    Plan:  --Will plan on endobronchial ultrasound with transbronchial biopsies today. Please keep NPO until procedure. Procedure discussed with patient and her mother and they are agreeable.  --Discussed with ob/gyn service, ok to give fentanyl and versed for moderate conscious sedation for the procedure.     Patient seen and discussed with Dr. Nia Salvador  Interventional Pulmonary Fellow  [phone]            Chief Complaint: Lymphadenopathy    History of Present Illness:   Juventino Dickerson is a 29 year old with history of depression who was admitted on 11/23/2020 for expedited work up for mediastinal and hilar lymphadenopathy. She was found to be about 5 weeks pregnant on admission. Interventional Pulmonology is consulted today for biopsy of lymph nodes.    Patient reports having cough for the past 3 weeks with post-tussive emesis. She also had to stop smoking since it exacerbated her cough. Currently denies any shortness of breath and cough is improved. She was found to have mediastinal and hilar lymphadenopathy on CT Chest PE protocol obtained for SOB         Data:   All pertinent laboratory and imaging data reviewed.      CT-PE 11/20/20  No PE noted. Multiple enlarged mediastinal and hilar lymph nodes.            Past Medical History:   Depression         Past Surgical  History:   History reviewed. No pertinent surgical history.         Social History:     Has a 2 year old and 9 year old at home.          Family History:   History reviewed. No pertinent family history.         Allergies:   No Known Allergies         Medications:       [Held by provider] enoxaparin ANTICOAGULANT  40 mg Subcutaneous Q24H     prenatal multivitamin w/iron  1 tablet Oral Daily            Review of Systems:   A 12 point review of systems is negative aside for as noted above         Physical Exam:   Temp:  [98.1  F (36.7  C)-100.4  F (38  C)] 99.8  F (37.7  C)  Pulse:  [73-95] 93  Resp:  [16-20] 16  BP: ()/(55-78) 110/76  SpO2:  [96 %-100 %] 98 %  General: Awake, alert and in no apparent distress  EENT: No scleral icterus  Neck: Trachea midline  Lungs: Breathing comfortably on room air.   Abdomen: Non-distended   MSK: No edema, no clubbing   Neurologic: Answering questions appropriately  Skin: No obvious rash  Pysch: Normal affect

## 2020-11-24 NOTE — PROCEDURES
Bone Marrow Biopsy Procedure Note  Patient's Name: Juvnetino Davis  Date of Procedure: 11/24/2020     PROCEDURE:  Unilateral bone marrow biopsy and unilateral aspirate      INDICATION:  Mediastinal mass      PERFORMED BY:  Ashlyn Carr MD/PhD and Asmita Shelton PA-C     CONSENT:  Informed consent was obtained from the patient. The risks and benefits of the procedure were explained. The patient agreed to undergo the procedure. The consent form was signed and placed in the paper chart.      PREMEDICATION:  She had the EBUS around 1215 on 11/24 and was given a total of fentanyl 175mcg, versed 4mg from 1315-0751. She was given additional 2mg versed with the BMBx. If she has bone marrow biopsies in the future, consider giving oxycodone 10mg and versed 2-4mg due to poor tolerance     PROCEDURE SUMMARY:  The patient's identification was positively verified by ID band. Patient was laid in the prone position. Premedication with above. The left posterior iliac crest  was prepped and draped in a sterile manner. The skin, deeper tissues, and periosteum of the left posterior iliac crest were anesthetized with approximately 20mL 1% lidocaine. Following this, a 3mm incision was made. The trephine needle was advanced into the left posterior iliac crest bone cavity and a 3 mm core biopsy was obtained. Next, bone marrow aspirates were obtained from the left posterior iliac crest approximately 20 ml of marrow were aspirated. Following the procedure, a sterile pressure dressing was applied to the bone marrow biopsy site.      This is a difficult bone marrow biopsy due to hard bone and pain intolerance. We had to use a long needle and only got 3cc bone marrow biopsy and patient is not willing to get more try.     COMPLICATIONS:  None. The patient tolerated the procedure very well with minimal discomfort.      RECOMMENDATIONS:  The patient was placed in the supine position to maintain pressure on the biopsy site.   The patient was  instructed to lie flat for 45-60 minutes and not to remove the dressing or get it wet for 24 hours post-procedure.      TESTS ORDERED:  Morphology  Flow cytometry  Chromosomes  FISH   Molecular (hold)    Ashlyn Carr MD, PhD  Hematology/Oncology   Pager: [phone]

## 2020-11-24 NOTE — PROCEDURES
INTERVENTIONAL PULMONOLOGY       Procedure(s):    A flexible bronchoscopy  EBUS  Therapeutic suctioning (1 sites)    Indication:  Enlarged mediastinal and hilar LN    Attending of Record:  Carolyn Kramer MD    Interventional Pulmonary Fellow   Allyson Salvador MD     Trainees Present:   None     Medications:    12 ml 4% lidocaine  9 ml 1% lidocaine  1 spray(s) hurricaine spray  4.5 mg versed  175 mcg fentanyl    Sedation Time:   Total sedation time was Choose Duration: 30 minutes of continuous bedside 1:1 monitoring.    Time Out:  Performed    The patient's medical record has been reviewed.  The indication for the procedure was reviewed.  The necessary history and physical examination was performed and reviewed.  The risks, benefits and alternatives of the procedure were discussed with the the patient in detail and she had the opportunity to ask questions. All questions were answered to the best of my ability.  Verbal and written informed consent was obtained.  The proposed procedure and the patient's identification were verified prior to the procedure by the physician and the nurse, respiratory therapist.    The patient was assessed for the adequacy for the procedure and to receive medications.   Mental Status:  Alert and oriented x 3  Airway examination:  Not assessed  Pulmonary:  Breathing comfortably on room air  CV:  Regular rate and rhythm  ASA Grade:  (I)  Normal healthy patient    After clinical evaluation and reviewing the indication, risks, alternatives and benefits of the procedure the patient was deemed to be in satisfactory condition to undergo the procedure.    Immediately before administration of medications the patient was re-assessed for adequacy to receive sedatives including the heart rate, respiratory rate, mental status, oxygen saturation, blood pressure and adequacy of pulmonary ventilation. These same parameters were continuously monitored throughout the procedure.    A Tuberculosis risk  assessment was performed:  The patient has no known RISK of Tuberculosis    The procedure was performed in a negative airflow room: Yes    Maneuvers / Procedure:      EBUS-TBNA: The EBUS scope was inserted and biopsies were obtained from   Station 11L with 6 passes, 6 samples obtained with DENISE present, a combination of suction and no suction was used to obtain the samples  Station 7 with 4 passes, 4 samples obtained with DENISE present, a combination of suction and no suction was used to obtain the samples  Station 4R with 4 passes, 4 samples obtained with DENISE present, a combination of suction and no suction was used to obtain the samples. EBUS samples were sent for cytology and flow cytometry.                                     Any disposable equipment was visually inspected and deemed to be intact immediately post procedure.      Relevant Pictures: None    Recommendations:     --Primary team to followup biopsy results.    Allyson Salvador  Interventional Pulmonary Fellow  [phone]

## 2020-11-24 NOTE — PROGRESS NOTES
Hematology / Oncology  Daily Progress Note   Date of Service: 11/24/2020  Patient: Juventino Dickerson  MRN: [id number]  Admission Date: 11/23/2020  Hospital Day # 1    Assessment & Plan:   Juventino Dickerson is a 29 year old female who presents with no significant past medical history, early term pregnancy, and nicotine use (3 ppd). Admitted for expedited work up of mediastinal mass which is compressing the SVC anteriorly but remains patent. She is status post EBUS and BMBx on 11/24, results in process.     Plan for today  - Abdominal and neck ultrasound to evaluate extent of disease- abdomen is normal, neck with adenopathy  - S/p EBUS 11/24 AM. Tolerated well, no complications  - S/p BMBx at 2pm 11/24. Only able to obtain 3mm core and 20 ml aspirate  - Admitted to observation per utilization review    HEME/ONC   # Mediastinal mass   # Cough - improving   Patient was seen in the ED on 11/20/20 for complaint of recurrent cough and SOB since 11/1/20. In the ED Chest X-ray 11/20 showed prominence in hilar regions bilaterally. CT was recommended for further evaluation. CT Chest 11/20 showed multiple enlarged mediastinal and bilateral hilar lymph nodes. The superior vena cava is compressed anteriorly, but remains patent. Concern for lymphoma. She was direct admitted to the hospital 11/23/20 for expedited work up. COVID neg on admission.    - HBsAb 113.99, HBsAgn nonreactive, HBcAb nonreactive, HIV nonreactive, HSV 1+/2+  - Interventional Pulmonary consulted, s/p EBUS 11/24. 14 samples taken. Tolerated well, no complications. Pathology in process  - (11/24) Abdominal ultrasound to eval extent of disease- normal  - (11/24) Neck ultrasound- right level 3 and 4 lymph nodes, measuring up to 1.9 cm  - S/p bmbx 11/24. Ordered morphology, flow, cyto, molecular. Only able to obtain 3mm core and 20 ml aspirate   - She had the EBUS around 1215 on 11/24 and was given a total of fentanyl 175mcg, versed 4mg from 4974-9284. She was  given additional 2mg versed with the BMBx. If she has bone marrow biopsies in the future, consider giving oxycodone 10mg and versed 2-4mg due to poor tolerance  - TLS/DIC daily. No e/o tumor lysis at this time.   - Micha montoya PRN, patient reports these are helpful    OBYGN   # Early-Term Pregnancy   Patient was found to be pregnant when she was seen in the ED on 11/20. HCG Quant 11/20 957. Per patient she is 5 week pregnant.  - OB consulted, appreciate recs. Recheck quantitative HCG 6321 on 11/23.   - (11/23) Transvaginal ultrasound assess viability of pregnancy demonstrated a likely IUP. Viability would be determined when fetal cardiac activity is present, which may take up to 14 days to develop  - Prenatal vitamin (11/24-x)  - Will continue to discuss Juventino's pregnancy intentions with her as her diagnosis and treatment plan evolves, including continuing the pregnancy and/or discussion of termination of pregnancy if this would allow more aggressive/different treatment options.     MISC   # Nicotine Use   Patient reports 3 ppd smoking history since the age of 20 yo. She quit 11/1/20 because it was making her cough worse  - Patient declines nicotine replacement at this time.     ID   # ID PPX  - We may consider starting acyclovir 400mg BID, ok to give during pregnancy per pharmacy (HSV 1+/2+)    FEN:  - PRN lyte replacement   - # Hypophosphatemia. Phos 2.2 on admission, replace per protocol  - RDAT     Prophy/Misc:  - VTE: ppx lovenox 40mg daily    - GI/PUD: N/A  - Bowels: Senna and Miralax PRN  - Activity: No PT/OT needs at this time, patient is independent and active currently  - Social: Updated patient's mom over the phone. She has 2 children, ages 2 and 9. Her mom is taking care of her kids while she is inpt.     Consults: Interventional Pulmonology, OB/GYN  CODE: Full code  Disposition: Admit to hospital for eval and treatment of lymphoma. Ultimately anticipate discharge to home  Follow up: To be  "determined.     Patient was seen and plan of care was discussed with attending physician Dr. Zhang.    Asmita Shelton PA-C  Hematology/Oncology  Pager # [phone]  Phone # [phone]   ___________________________________________________________________    Subjective & Interval History:    No acute events noted overnight. Endorses occasional dry cough, tessalon helps. She was coughing much more frequently after the bronchoscopy, was given tessalon and robitussin with relief. A comprehensive review of systems was reviewed with the patient and the pertinent positives are listed in the HPI above.      Physical Exam:    Blood pressure 111/59, pulse 98, temperature 99.8  F (37.7  C), temperature source Oral, resp. rate 29, height 1.549 m (5' 1\"), weight 74.8 kg (165 lb), last menstrual period 10/17/2020, SpO2 100 %.    General: alert and cooperative, sitting in bed, no acute distress  HEENT: sclera anicteric, EOMI, MMM  Neck: supple, normal ROM  CV: RRR, normal S1/S2, no m/r/g  Resp: CTAB, no wheezing/crackles, normal respiratory effort on ambient air  GI: soft, non-tender, non-distended, bowel sounds present and normoactive  MSK: warm and well-perfused, no edema  Skin: no rashes on limited exam, no jaundice  Neuro: AOx3, moves all extremities equally, no focal deficits    Labs & Studies: I personally reviewed the following studies:  ROUTINE LABS (Last four results):  CMP  Recent Labs   Lab 11/24/20  0543 11/23/20  1534 11/23/20  1354 11/20/20  0156     --  135 136   POTASSIUM 3.5  --  3.7 3.6   CHLORIDE 110*  --  107 105   CO2 20  --  23 24   ANIONGAP 6  --  5 8   GLC 82  --  73 85   BUN 9  --  9 10   CR 0.69  --  0.73 0.72   GFRESTIMATED >90  --  >90 >90   GFRESTBLACK >90  --  >90 >90   ANTON 9.0  --  9.0 8.8   MAG 1.8  --  2.0  --    PHOS 3.0 2.1* 2.2*  --    PROTTOTAL  --   --  8.0 7.4   ALBUMIN  --   --  4.0 3.7   BILITOTAL  --   --  0.5 0.3   ALKPHOS  --   --  75 65   AST  --   --  16 12   ALT  --   --  28 " 24     CBC  Recent Labs   Lab 11/24/20  0543 11/23/20  1354 11/20/20  0156   WBC 5.2 5.2 5.5   RBC 4.18 4.42 4.15   HGB 11.9 12.6 11.6*   HCT 36.8 39.3 36.8   MCV 88 89 89   MCH 28.5 28.5 28.0   MCHC 32.3 32.1 31.5   RDW 14.7 14.8 15.0    447 404     INR  Recent Labs   Lab 11/24/20  0543 11/23/20  1354   INR 1.06 1.06       Microbiology  No results for input(s): LACT in the last 168 hours.    Pertinent Imaging    US Neck 11/24/20  IMPRESSION: Right level 3 and 4 lymph nodes, measuring up to 1.9 cm    US Abdomen 11/24/20  IMPRESSION: Normal abdominal ultrasound.    CT Chest PE 11/20/20  IMPRESSION:  1.  There is no pulmonary embolus, aortic aneurysm or dissection.     2.  Multiple enlarged mediastinal and bilateral hilar lymph nodes. Lymphoma is a possibility.  Medications list for reference:  Current Facility-Administered Medications   Medication     acetaminophen (TYLENOL) tablet 650 mg     albuterol (PROAIR HFA/PROVENTIL HFA/VENTOLIN HFA) 108 (90 Base) MCG/ACT inhaler 2 puff     benzocaine 20% (HURRICAINE/TOPEX) 20 % spray     benzonatate (TESSALON) capsule 200 mg     [Held by provider] enoxaparin ANTICOAGULANT (LOVENOX) injection 40 mg     fentaNYL (PF) (SUBLIMAZE) injection     guaiFENesin-dextromethorphan (ROBITUSSIN DM) 100-10 MG/5ML syrup 5 mL     lidocaine (XYLOCAINE) 2 % solution     lidocaine 1 %     lidocaine 4 % injection     midazolam (VERSED) injection     No rectal suppositories if WBC less than 1000/microliters or platelets less than 50,000/ L     prenatal multivitamin w/iron per tablet 1 tablet     senna-docusate (SENOKOT-S/PERICOLACE) 8.6-50 MG per tablet 1 tablet    Or     senna-docusate (SENOKOT-S/PERICOLACE) 8.6-50 MG per tablet 2 tablet     I have seen, interviewed, and examined the patient independently.  I have reviewed the vital signs and labs.  This note reflects my assessment and plan.    Remains stable.   Neck ultra sound details LAD <=1.9 cm  Abd ultra sound normal    BMBx and FNA  done today. If unrevealing will pursue IR biopsy. OB says ok to give whatever sedation is needed for this procedure, if needed.    Elenita Zhang MD/PhD

## 2020-11-24 NOTE — UTILIZATION REVIEW
"Admission Status; Secondary Review Determination     Admission Date: 11/23/2020  1:00 PM      Under the authority of the Utilization Management Committee, the utilization review process indicated a secondary review on the above patient.  The review outcome is based on review of the medical records, discussions with staff, and applying clinical experience noted on the date of the review.          (x) Observation Status Appropriate - This patient does not meet hospital inpatient criteria and is placed in observation status. If this patient's primary payer is Medicare and was admitted as an inpatient, Condition Code 44 should be used and patient status changed to \"observation\".     RATIONALE FOR DETERMINATION      29 year old female who presents with no significant past medical history, early term pregnancy, and nicotine use (3 ppd). Admitted for expedited work up of mediastinal mass- concern for lymphoma. No tumor lysis at this time. Underwent endobronchial ultrasound with transbronchial biopsies. Covid negative.  Ultrasounds of abdomen and neck done. Early pregnancy at approximately 5 weeks.    The severity of illness, intensity of service provided, expected LOS and risk for adverse outcome make the care appropriate for further observation; however, doesn't meet criteria for hospital inpatient admission. This was discussed with attending physician who concurred with this determination.        The information on this document is developed by the utilization review team in order for the business office to ensure compliance.  This only denotes the appropriateness of proper admission status and does not reflect the quality of care rendered.         The definitions of Inpatient Status and Observation Status used in making the determination above are those provided in the CMS Coverage Manual, Chapter 1 and Chapter 6, section 70.4.      Sincerely,     Concepcion Fair MD  Physician Advisor  [facility]    "

## 2020-11-25 VITALS
SYSTOLIC BLOOD PRESSURE: 110 MMHG | WEIGHT: 165.9 LBS | BODY MASS INDEX: 31.32 KG/M2 | HEART RATE: 74 BPM | TEMPERATURE: 98.4 F | DIASTOLIC BLOOD PRESSURE: 55 MMHG | RESPIRATION RATE: 18 BRPM | HEIGHT: 61 IN | OXYGEN SATURATION: 100 %

## 2020-11-25 LAB
ANION GAP SERPL CALCULATED.3IONS-SCNC: 6 MMOL/L (ref 3–14)
BASOPHILS # BLD AUTO: 0.1 10E9/L (ref 0–0.2)
BASOPHILS NFR BLD AUTO: 1.1 %
BUN SERPL-MCNC: 7 MG/DL (ref 7–30)
CALCIUM SERPL-MCNC: 8.6 MG/DL (ref 8.5–10.1)
CALCIUM UR-MCNC: 33.5 MG/DL
CALCIUM/CREAT UR: 0.14 G/G CR
CHLORIDE SERPL-SCNC: 110 MMOL/L (ref 94–109)
CO2 SERPL-SCNC: 21 MMOL/L (ref 20–32)
COPATH REPORT: NORMAL
CREAT SERPL-MCNC: 0.68 MG/DL (ref 0.52–1.04)
CRP SERPL-MCNC: 14 MG/L (ref 0–8)
DIFFERENTIAL METHOD BLD: ABNORMAL
EOSINOPHIL # BLD AUTO: 0.3 10E9/L (ref 0–0.7)
EOSINOPHIL NFR BLD AUTO: 5.9 %
ERYTHROCYTE [DISTWIDTH] IN BLOOD BY AUTOMATED COUNT: 14.5 % (ref 10–15)
ERYTHROCYTE [SEDIMENTATION RATE] IN BLOOD BY WESTERGREN METHOD: 32 MM/H (ref 0–20)
FIBRINOGEN PPP-MCNC: 472 MG/DL (ref 200–420)
GFR SERPL CREATININE-BSD FRML MDRD: >90 ML/MIN/{1.73_M2}
GLUCOSE SERPL-MCNC: 86 MG/DL (ref 70–99)
HCT VFR BLD AUTO: 35.9 % (ref 35–47)
HGB BLD-MCNC: 11.5 G/DL (ref 11.7–15.7)
IMM GRANULOCYTES # BLD: 0 10E9/L (ref 0–0.4)
IMM GRANULOCYTES NFR BLD: 0.2 %
INR PPP: 1.08 (ref 0.86–1.14)
LDH SERPL L TO P-CCNC: 161 U/L (ref 81–234)
LYMPHOCYTES # BLD AUTO: 1.3 10E9/L (ref 0.8–5.3)
LYMPHOCYTES NFR BLD AUTO: 28.1 %
MAGNESIUM SERPL-MCNC: 2 MG/DL (ref 1.6–2.3)
MCH RBC QN AUTO: 28.3 PG (ref 26.5–33)
MCHC RBC AUTO-ENTMCNC: 32 G/DL (ref 31.5–36.5)
MCV RBC AUTO: 88 FL (ref 78–100)
MONOCYTES # BLD AUTO: 0.5 10E9/L (ref 0–1.3)
MONOCYTES NFR BLD AUTO: 10.9 %
NEUTROPHILS # BLD AUTO: 2.5 10E9/L (ref 1.6–8.3)
NEUTROPHILS NFR BLD AUTO: 53.8 %
NRBC # BLD AUTO: 0 10*3/UL
NRBC BLD AUTO-RTO: 0 /100
PHOSPHATE SERPL-MCNC: 3.1 MG/DL (ref 2.5–4.5)
PLATELET # BLD AUTO: 382 10E9/L (ref 150–450)
POTASSIUM SERPL-SCNC: 3.6 MMOL/L (ref 3.4–5.3)
RBC # BLD AUTO: 4.06 10E12/L (ref 3.8–5.2)
SODIUM SERPL-SCNC: 137 MMOL/L (ref 133–144)
URATE SERPL-MCNC: 2.4 MG/DL (ref 2.6–6)
WBC # BLD AUTO: 4.6 10E9/L (ref 4–11)

## 2020-11-25 PROCEDURE — 83615 LACTATE (LD) (LDH) ENZYME: CPT | Performed by: PHYSICIAN ASSISTANT

## 2020-11-25 PROCEDURE — 86140 C-REACTIVE PROTEIN: CPT | Performed by: PHYSICIAN ASSISTANT

## 2020-11-25 PROCEDURE — 250N000013 HC RX MED GY IP 250 OP 250 PS 637: Performed by: PHYSICIAN ASSISTANT

## 2020-11-25 PROCEDURE — 85652 RBC SED RATE AUTOMATED: CPT | Performed by: PHYSICIAN ASSISTANT

## 2020-11-25 PROCEDURE — 82340 ASSAY OF CALCIUM IN URINE: CPT | Performed by: PHYSICIAN ASSISTANT

## 2020-11-25 PROCEDURE — 93010 ELECTROCARDIOGRAM REPORT: CPT | Performed by: INTERNAL MEDICINE

## 2020-11-25 PROCEDURE — 85025 COMPLETE CBC W/AUTO DIFF WBC: CPT | Performed by: PHYSICIAN ASSISTANT

## 2020-11-25 PROCEDURE — 36415 COLL VENOUS BLD VENIPUNCTURE: CPT | Performed by: PHYSICIAN ASSISTANT

## 2020-11-25 PROCEDURE — 85384 FIBRINOGEN ACTIVITY: CPT | Performed by: PHYSICIAN ASSISTANT

## 2020-11-25 PROCEDURE — 83735 ASSAY OF MAGNESIUM: CPT | Performed by: PHYSICIAN ASSISTANT

## 2020-11-25 PROCEDURE — 82164 ANGIOTENSIN I ENZYME TEST: CPT | Performed by: PHYSICIAN ASSISTANT

## 2020-11-25 PROCEDURE — 85610 PROTHROMBIN TIME: CPT | Performed by: PHYSICIAN ASSISTANT

## 2020-11-25 PROCEDURE — 84550 ASSAY OF BLOOD/URIC ACID: CPT | Performed by: PHYSICIAN ASSISTANT

## 2020-11-25 PROCEDURE — 80048 BASIC METABOLIC PNL TOTAL CA: CPT | Performed by: PHYSICIAN ASSISTANT

## 2020-11-25 PROCEDURE — 85652 RBC SED RATE AUTOMATED: CPT | Performed by: INTERNAL MEDICINE

## 2020-11-25 PROCEDURE — 87385 HISTOPLASMA CAPSUL AG IA: CPT | Performed by: PHYSICIAN ASSISTANT

## 2020-11-25 PROCEDURE — 87449 NOS EACH ORGANISM AG IA: CPT | Performed by: PHYSICIAN ASSISTANT

## 2020-11-25 PROCEDURE — G0378 HOSPITAL OBSERVATION PER HR: HCPCS

## 2020-11-25 PROCEDURE — 99217 PR OBSERVATION CARE DISCHARGE: CPT | Performed by: INTERNAL MEDICINE

## 2020-11-25 PROCEDURE — 84100 ASSAY OF PHOSPHORUS: CPT | Performed by: PHYSICIAN ASSISTANT

## 2020-11-25 RX ORDER — PRENATAL VIT/IRON FUM/FOLIC AC 27MG-0.8MG
1 TABLET ORAL DAILY
Qty: 30 TABLET | Refills: 0 | Status: SHIPPED | OUTPATIENT
Start: 2020-11-26

## 2020-11-25 RX ORDER — BENZONATATE 200 MG/1
200 CAPSULE ORAL 3 TIMES DAILY PRN
Qty: 21 CAPSULE | Refills: 0 | Status: SHIPPED | OUTPATIENT
Start: 2020-11-25 | End: 2020-12-28

## 2020-11-25 RX ADMIN — ACETAMINOPHEN 650 MG: 325 TABLET, FILM COATED ORAL at 12:48

## 2020-11-25 RX ADMIN — PRENATAL VIT W/ FE FUMARATE-FA TAB 27-0.8 MG 1 TABLET: 27-0.8 TAB at 10:09

## 2020-11-25 ASSESSMENT — MIFFLIN-ST. JEOR: SCORE: 1419.9

## 2020-11-25 NOTE — PROGRESS NOTES
"PRIMARY DIAGNOSIS: \"GENERIC\" NURSING  OUTPATIENT/OBSERVATION GOALS TO BE MET BEFORE DISCHARGE:  1. ADLs back to baseline: Yes    2. Activity and level of assistance: Ambulating independently.    3. Pain status: Pain free.    4. Return to near baseline physical activity: Yes     Discharge Planner Nurse   Safe discharge environment identified: Yes  Barriers to discharge: Yes       Entered by: Jossie Nails 11/25/2020 4:53 AM     Please review provider order for any additional goals.   Nurse to notify provider when observation goals have been met and patient is ready for discharge.    Evaluation for possible lymphoma. Sleeping at beginning of shift. AVSS. Denies pain. Ate dinner, good appetite. bmbx site is C/D/I. Awaiting bmbx results. Continue plan of care.   "

## 2020-11-25 NOTE — PLAN OF CARE
Discharge  D: Orders for discharge and outpatient medications written.  I: Home medications and return to clinic schedule reviewed with patient. Discharge instructions and parameters for calling Health Care Provider reviewed. Patient left around 1900 accompanied by family.   A: Patient/family verbalized understanding and was ready for discharge.   P: Patient instructed to  medications in Pharmacy. Follow up as to be scheduled.     [id number]:  VSS. Tylenol given x1 for generalized pain. Pt reports cough is improving, declined PRN interventions. EKG completed. Pt wanted to be discharged this morning, team provided information on importance of staying within the hospital; continue to reinforce. Pt withdrawn most of shift. No acute events.     Pt resulted negative w/ oncology work-up. Extra labs collected. Urine sample collected. PIV removed. Discharge paperwork was discussed and all questions answered. Pt took all belongings w/.

## 2020-11-25 NOTE — UTILIZATION REVIEW
Concurrent stay review; Secondary Review Determination     Under the authority of the Utilization Management Committee, the utilization review process indicated a secondary review on the above patient.  The review outcome is based on review of the medical records, discussions with staff, and applying clinical experience noted on the date of the review.          (x) Observation Status Appropriate - Concurrent stay review    RATIONALE FOR DETERMINATION   30 yo female with tobacco abuse, 1st trimester pregnancy, admitted to observation for expedited work-up of mediastinal mass with lymphadenopathy. Possible malignancy vs sarcoidosis. So far bone marrow biopsy negative for leukemia, lymphoma or dysplasia. Still awaiting some final tests to make plan for discharge to home with outpatient follow up vs inpatient treatment of malignancy during pregnancy. She is not requiring any oxygen, has stable vital signs and labs.     Discussed with Asmita Shelton PA-C, who anticipates results from pathology this afternoon. May need PA review again Friday if patient remains in the hospital.    Patient is clinically improving and there is no clear indication to change patient's status to inpatient. The severity of illness, intensity of service provided, expected LOS and risk for adverse outcome make the care appropriate for observation.    This document was produced using voice recognition software     The information on this document is developed by the utilization review team in order for the business office to ensure compliance.  This only denotes the appropriateness of proper admission status and does not reflect the quality of care rendered.         The definitions of Inpatient Status and Observation Status used in making the determination above are those provided in the CMS Coverage Manual, Chapter 1 and Chapter 6, section 70.4.      Sincerely,   Jacqueline Villa MD  Utilization Review  Physician Advisor  [facility]  Services.

## 2020-11-25 NOTE — PROGRESS NOTES
"PRIMARY DIAGNOSIS: \"GENERIC\" NURSING  OUTPATIENT/OBSERVATION GOALS TO BE MET BEFORE DISCHARGE:  1. ADLs back to baseline: Yes    2. Activity and level of assistance: Ambulating independently.    3. Pain status: Improved-controlled with oral pain medications.    4. Return to near baseline physical activity: No     Discharge Planner Nurse   Safe discharge environment identified: No  Barriers to discharge: Yes       Entered by: Elana Balbuena 11/24/2020 6:46 PM     Please review provider order for any additional goals.   Nurse to notify provider when observation goals have been met and patient is ready for discharge.    Evaluation for possible lymphoma. Given tylenol x1 for pain at bone marrow biopsy site. Ice packs provided too. Patient reported improved pain. Mother called multiple times with questions that were answered. Asmita ERAZO called to follow up with ultrasound results for mother. Mother would like to be included in rounds tomorrow AM. Continue to monitor.   "

## 2020-11-25 NOTE — PROGRESS NOTES
"PRIMARY DIAGNOSIS: \"GENERIC\" NURSING  OUTPATIENT/OBSERVATION GOALS TO BE MET BEFORE DISCHARGE:  1. ADLs back to baseline: Yes    2. Activity and level of assistance: Ambulating independently.    3. Pain status: Pain free.    4. Return to near baseline physical activity: Yes     Discharge Planner Nurse   Safe discharge environment identified: Yes  Barriers to discharge: yes       Entered by: Jossie Nails 11/25/2020 4:50 AM     Please review provider order for any additional goals.   Nurse to notify provider when observation goals have been met and patient is ready for discharge.    Evaluation for possible lymphoma. AVSS. Denies pain overnight. Sleeping well. bmbx site is C/D/I Awaiting bmbx results. Continue plan of care.  "

## 2020-11-25 NOTE — PROGRESS NOTES
Hematology / Oncology  Daily Progress Note   Date of Service: 11/25/2020  Patient: Juventino Dickerson  MRN: [id number]  Admission Date: 11/23/2020  Hospital Day # 1    Assessment & Plan:   Juventino Dickerson is a 29 year old female who presents with no significant past medical history, early term pregnancy, and nicotine use (3 ppd). Admitted for expedited work up of mediastinal mass which is compressing the SVC anteriorly but remains patent. She is status post EBUS and BMBx on 11/24, results in process.     Plan for today  - S/p lymph node FNA with Interventional Pulm 11/24. In process  - S/p BMBx at 2pm 11/24. No e/o lymphoma.   - Patient endorses chest discomfort of the right chest wall radiating to the right shoulder. EKG unchanged from 11/20/20. Most likely musculoskeletal, see below for detail. Self-resolved. Recommend Tylenol PRN    ADDENDUM: D/w Pathology, patient's biopsy shows granulomatous lymphadenitis. The differential is broad and includes infectious causes (?fungal), rheumatologic disease (?sarcoid), low on the differential is Hodgkin's lymphoma but is very unlikely. Ordered ACE, urine calcium, histo, blasto, ESR, CRP. Discussed informally with Infectious Disease, they recommended outpatient evaluation, ID referral sent. Discussed informally with Rheumatology, they recommend outpt eval, referral sent.     HEME/ONC   # Mediastinal mass   # Cough - improving   Patient was seen in the ED on 11/20/20 for complaint of recurrent cough and SOB since 11/1/20. In the ED Chest X-ray 11/20 showed prominence in hilar regions bilaterally. CT was recommended for further evaluation. CT Chest 11/20 showed multiple enlarged mediastinal and bilateral hilar lymph nodes. The superior vena cava is compressed anteriorly, but remains patent. Concern for lymphoma. She was direct admitted to the hospital 11/23/20 for expedited work up. COVID neg on admission.    - HBsAb 113.99, HBsAgn nonreactive, HBcAb nonreactive, HIV  nonreactive, HSV 1+/2+  - Per Interventional Radiology, the mediastinal mass cannot be safely accessed for biopsy (would have to go through the back and through the lung which is not recommended)  - Interventional Pulmonary consulted, s/p EBUS with lymph node FNA 11/24. Tolerated well, no complications. Pathology in process  - (11/24) Abdominal ultrasound to eval extent of disease- normal  - (11/24) Neck ultrasound- right level 3 and 4 lymph nodes, measuring up to 1.9 cm  - S/p bmbx 11/24. Ordered morphology, flow, cyto, molecular. Only able to obtain 3mm core and 20 ml aspirate   - She had the EBUS around 1215 on 11/24 and was given a total of fentanyl 175mcg, versed 4mg from 5770-4689. She was given additional 2mg versed with the BMBx. If she has bone marrow biopsies in the future, consider giving oxycodone 10mg and versed 2-4mg vs under anesthesia due to poor tolerance  - If the above biopsies are unrevealing, next step would be a neck lymph node core biopsy with Neuro Radiology (will need consult and can likely arrange bx on 11/27). ENT will only consider lymph node excision if the core biopsy is unremarkable  - TLS/DIC daily. No e/o tumor lysis at this time.   - Tessalon perles and robitussin PRN, patient reports these are helpful    OBYGN   # Early-Term Pregnancy   Patient was found to be pregnant when she was seen in the ED on 11/20. HCG Quant 11/20 957. Per patient she is 5 week pregnant.  - OB consulted, appreciate recs. Recheck quantitative HCG 6321 on 11/23.   - (11/23) Transvaginal ultrasound assess viability of pregnancy demonstrated a likely IUP. Viability would be determined when fetal cardiac activity is present, which may take up to 14 days to develop  - Prenatal vitamin (11/24-x). Of note, the hospital formulary prenatal multivitamins contain folic acid.   - Will continue to discuss Juventino's pregnancy intentions with her as her diagnosis and treatment plan evolves, including continuing the  "pregnancy and/or discussion of termination of pregnancy if this would allow more aggressive/different treatment options.    CV/MSK  # Chest discomfort  Patient endorses chest discomfort of the right chest wall radiating to the right shoulder. She thinks this started a few days ago, then had gone away. The pain is intermittent, lasting hours, described as \"pressure\" or like \"something is broken\". She has full range of motion of her right shoulder and pain is not reproducible. Palpation of her right clavicle is unremarkable- no tenderness or obvious fracture. Resolved without intervention. Ddx cardiac in origin (though less likely), vs musculoskeletal secondary to previous frequent harsh cough, vs referred pain from lymphadenopathy, vs anxiety.   - EKG with NSR, cannot rule out anterior infact, unchanged from 11/20/20  - Tylenol PRN     MISC   # Nicotine Use   Patient reports 3 ppd smoking history since the age of 20 yo. She quit 11/1/20 because it was making her cough worse  - Patient declines nicotine replacement at this time.     ID   # ID PPX  - We may consider starting acyclovir 400mg BID, ok to give during pregnancy per pharmacy (HSV 1+/2+)    FEN:  - PRN lyte replacement   - # Hypophosphatemia. Phos 2.2 on admission, replace per protocol  - RDAT     Prophy/Misc:  - VTE: ppx lovenox 40mg daily    - GI/PUD: N/A  - Bowels: Senna and Miralax PRN  - Activity: No PT/OT needs at this time, patient is independent and active currently  - Social: Updated patient's mom over the phone. She has 2 children, ages 2 and 9. Her mom is taking care of her kids while she is inpt.     Consults: Interventional Pulmonology, OB/GYN  CODE: Full code  Disposition: Admit to hospital for eval and treatment of lymphoma. Ultimately anticipate discharge to home  Follow up: To be determined, if lymphoma we will plan on referring her to Dr. Stout    Patient was seen and plan of care was discussed with attending physician  " "[facility].    Asmita Shelton PA-C  Hematology/Oncology  Pager # [phone]  Phone # [phone]   ___________________________________________________________________    Subjective & Interval History:    This morning she had intermittent right chest discomfortradiating to the right shoulder. She thinks this started a few days ago, then had gone away. The pain is intermittent, lasting hours, described as \"pressure\" or like \"something is broken\". She has full range of motion of her right shoulder and pain is not reproducible. Palpation of her right clavicle is unremarkable- no tenderness or obvious fracture. No diaphoresis, headache, or vision changes. Endorses occasional dry cough, tessalon helps. A comprehensive review of systems was reviewed with the patient and the pertinent positives are listed in the HPI above.      She is very anxious to leave but after further discussion she is agreeable to staying inpt while we try to figure out her diagnosis and treatment plan.     Physical Exam:    Blood pressure 108/52, pulse 80, temperature 98.8  F (37.1  C), temperature source Oral, resp. rate 18, height 1.549 m (5' 1\"), weight 74.8 kg (165 lb), last menstrual period 10/17/2020, SpO2 99 %.    General: alert and cooperative, sitting in bed, no acute distress  HEENT: sclera anicteric, EOMI, MMM  Neck: supple, normal ROM  CV: RRR, normal S1/S2, no m/r/g  Resp: CTAB, no wheezing/crackles, normal respiratory effort on ambient air  GI: soft, non-tender, non-distended, bowel sounds present and normoactive  MSK: warm and well-perfused, no edema  Skin: no rashes on limited exam, no jaundice  Neuro: AOx3, moves all extremities equally, no focal deficits    Labs & Studies: I personally reviewed the following studies:  ROUTINE LABS (Last four results):  CMP  Recent Labs   Lab 11/25/20  0528 11/24/20  0543 11/23/20  1534 11/23/20  1354 11/20/20  0156    137  --  135 136   POTASSIUM 3.6 3.5  --  3.7 3.6   CHLORIDE 110* 110*  --  " 107 105   CO2 21 20  --  23 24   ANIONGAP 6 6  --  5 8   GLC 86 82  --  73 85   BUN 7 9  --  9 10   CR 0.68 0.69  --  0.73 0.72   GFRESTIMATED >90 >90  --  >90 >90   GFRESTBLACK >90 >90  --  >90 >90   ANTON 8.6 9.0  --  9.0 8.8   MAG 2.0 1.8  --  2.0  --    PHOS 3.1 3.0 2.1* 2.2*  --    PROTTOTAL  --   --   --  8.0 7.4   ALBUMIN  --   --   --  4.0 3.7   BILITOTAL  --   --   --  0.5 0.3   ALKPHOS  --   --   --  75 65   AST  --   --   --  16 12   ALT  --   --   --  28 24     CBC  Recent Labs   Lab 11/25/20  0528 11/24/20  0543 11/23/20  1354 11/20/20  0156   WBC 4.6 5.2 5.2 5.5   RBC 4.06 4.18 4.42 4.15   HGB 11.5* 11.9 12.6 11.6*   HCT 35.9 36.8 39.3 36.8   MCV 88 88 89 89   MCH 28.3 28.5 28.5 28.0   MCHC 32.0 32.3 32.1 31.5   RDW 14.5 14.7 14.8 15.0    418 447 404     INR  Recent Labs   Lab 11/25/20  0528 11/24/20  0543 11/23/20  1354   INR 1.08 1.06 1.06       Microbiology  No results for input(s): LACT in the last 168 hours.    Pertinent Imaging    US Neck 11/24/20  IMPRESSION: Right level 3 and 4 lymph nodes, measuring up to 1.9 cm    US Abdomen 11/24/20  IMPRESSION: Normal abdominal ultrasound.    CT Chest PE 11/20/20  IMPRESSION:  1.  There is no pulmonary embolus, aortic aneurysm or dissection.     2.  Multiple enlarged mediastinal and bilateral hilar lymph nodes. Lymphoma is a possibility.  Medications list for reference:  Current Facility-Administered Medications   Medication     acetaminophen (TYLENOL) tablet 650 mg     albuterol (PROAIR HFA/PROVENTIL HFA/VENTOLIN HFA) 108 (90 Base) MCG/ACT inhaler 2 puff     benzonatate (TESSALON) capsule 200 mg     enoxaparin ANTICOAGULANT (LOVENOX) injection 40 mg     guaiFENesin-dextromethorphan (ROBITUSSIN DM) 100-10 MG/5ML syrup 10 mL     No rectal suppositories if WBC less than 1000/microliters or platelets less than 50,000/ L     prenatal multivitamin w/iron per tablet 1 tablet     senna-docusate (SENOKOT-S/PERICOLACE) 8.6-50 MG per tablet 1 tablet    Or      senna-docusate (SENOKOT-S/PERICOLACE) 8.6-50 MG per tablet 2 tablet   I have seen, interviewed, and examined the patient independently.  I have reviewed the vital signs and labs.  This note reflects my assessment and plan.     Remains stable.   Neck ultra sound details LAD <=1.9 cm  Abd ultra sound normal     BMBx and FNA done yesterday. Results pending. If unrevealing will pursue IR biopsy: we reviewed with them and they are unable to obtain good material. We will discuss with ENT whether they could excise an enlarged neck node if pulm FNA is uninformtative. OB says ok to give whatever sedation is needed for procedures, if needed.     She wants to go home, but I explained that this could progress and cause long-lasting complications (including stroke and death) so I asked her to stay with us until we have a diagnosis and treatment plan in place     Elenita Zhang MD/PhD

## 2020-11-26 NOTE — DISCHARGE SUMMARY
[facility]     Discharge Summary  Hematology / Oncology    Date of Admission:  11/23/2020  Date of Discharge:  11/25/2020  7:16 PM  Discharging Provider: Asmita Shelton  Date of Service (when I saw the patient): 11/25/20    Discharge Diagnoses   Patient Active Problem List   Diagnosis     Mediastinal mass     Lymphadenopathy, mediastinal       History of Present Illness   Juventino Dickerson is a 29 year old female who presents with no significant past medical history, early term pregnancy, and nicotine use (3 ppd). Admitted for expedited work up of mediastinal mass which is compressing the SVC anteriorly but remains patent. She is status post EBUS/TBNA and BMBx on 11/24. D/w Pathology, patient's biopsy shows granulomatous lymphadenitis.     The differential is broad and includes infectious causes (?fungal), rheumatologic disease (?sarcoid), low on the differential is Hodgkin's lymphoma but is very unlikely. Ordered ACE, urine calcium, histo, blasto, ESR, CRP. Discussed informally with Infectious Disease, they recommended outpatient evaluation, ID referral sent. Discussed informally with Rheumatology, they agree that she can be evaluated outpt, referral sent. Patient remained hemodynamically stable and only had symptoms of dry cough and intermittent musculoskeletal right chest discomfort. Given stability and recommendations per path, ID, and Rheumatology, she was discharged 11/25.     To be Addressed at Follow up:  - Referred to OB  - Referred to ID  - Referred to Rheumatology    New Discharge Medications:  - Prenatal multivitamin     Next Follow up Appointments:  - Outpatient schedulers will call her to arrange consultations as above       Hospital Course   Juventino Dickerson was admitted on 11/23/2020.  The following problems were addressed during her hospitalization:    # Mediastinal mass   # Cough - improving   Patient was seen in the ED on 11/20/20 for complaint of  recurrent cough and SOB since 11/1/20. In the ED Chest X-ray 11/20 showed prominence in hilar regions bilaterally. CT was recommended for further evaluation. CT Chest 11/20 showed multiple enlarged mediastinal and bilateral hilar lymph nodes. The superior vena cava is compressed anteriorly, but remains patent. Concern for lymphoma. She was direct admitted to the hospital 11/23/20 for expedited work up. COVID neg on admission.    - HBsAb 113.99, HBsAgn nonreactive, HBcAb nonreactive, HIV nonreactive, HSV 1+/2+  - (11/24) Abdominal ultrasound to eval extent of disease- normal  - (11/24) Neck ultrasound- right level 3 and 4 lymph nodes, measuring up to 1.9 cm  - S/p bmbx 11/24. Normocellular marrow for age (70%) with trilineage hematopoiesis; no morphologic or immunophenotypic evidence for leukemia/lymphoma                - She had the EBUS around 1215 on 11/24 and was given a total of fentanyl 175mcg, versed 4mg from 3836-5142. She was given additional 2mg versed with the BMBx. If she has bone marrow biopsies in the future, consider giving oxycodone 10mg and versed 2-4mg vs under anesthesia due to poor tolerance  - Per Interventional Radiology, the mediastinal mass cannot be safely accessed for biopsy (would have to go through the back and through the lung which is not recommended)  - Interventional Pulmonary consulted, s/p EBUS with lymph node FNA 11/24. Tolerated well, no complications.    -  D/w Pathology, patient's biopsy shows granulomatous lymphadenitis.   - The differential is broad and includes infectious causes (?fungal), rheumatologic disease (?sarcoid), low on the differential is Hodgkin's lymphoma but is very unlikely. Ordered ACE, urine calcium, histo, blasto, ESR, CRP. Discussed informally with Infectious Disease, they recommended outpatient evaluation, ID referral sent. Discussed informally with Rheumatology, they recommend outpt eval, referral sent.  - Tessalon perles and robitussin PRN, patient  "reports these are helpful     OBYGN   # Early-Term Pregnancy   Patient was found to be pregnant when she was seen in the ED on 11/20. HCG Quant 11/20 957. Per patient she is 5 week pregnant.  - OB consulted, appreciate recs. Recheck quantitative HCG 6321 on 11/23.   - (11/23) Transvaginal ultrasound assess viability of pregnancy demonstrated a likely IUP. Viability would be determined when fetal cardiac activity is present, which may take up to 14 days to develop  - Prenatal vitamin (11/24-x). Of note, the hospital formulary prenatal multivitamins contain folic acid.   - referred to OB on discharge     CV/MSK  # Chest discomfort  Patient endorses chest discomfort of the right chest wall radiating to the right shoulder. She thinks this started a few days ago, then had gone away. The pain is intermittent, lasting hours, described as \"pressure\" or like \"something is broken\". She has full range of motion of her right shoulder and pain is not reproducible. Palpation of her right clavicle is unremarkable- no tenderness or obvious fracture. Resolved without intervention. Ddx cardiac in origin (though less likely), vs musculoskeletal secondary to previous frequent harsh cough, vs referred pain from lymphadenopathy, vs anxiety.   - EKG with NSR, cannot rule out anterior infact, unchanged from 11/20/20  - Tylenol PRN      MISC   # Nicotine Use   Patient reports 3 ppd smoking history since the age of 20 yo. She quit 11/1/20 because it was making her cough worse  - Patient declines nicotine replacement at this time.      FEN:  - PRN lyte replacement               - # Hypophosphatemia. Phos 2.2 on admission, replace per protocol  - RDAT     Prophy/Misc:  - VTE: ppx lovenox 40mg daily    - GI/PUD: N/A  - Bowels: Senna and Miralax PRN  - Activity: No PT/OT needs at this time, patient is independent and active currently  - Social: Updated patient's mom over the phone. She has 2 children, ages 2 and 9. Her mom is taking care of her " kids while she is inpt.      Consults: Interventional Pulmonology, OB/GYN  CODE: Full code  Discharged to home on 11/25.     Patient was seen and plan of care was discussed with attending physician Dr. Zhang.    Asmita Shelton PA-C  Hematology/Oncology  Pager # [phone]  Phone # [phone]     Significant Results and Procedures   - EBUS with lymph node FNA 11/24  - BMBx 11/24    Pending Results   These results will be followed up by Rheumatology and ID  Unresulted Labs Ordered in the Past 30 Days of this Admission     Date and Time Order Name Status Description    11/25/2020 1806 Histoplasma Capsulatum Agn Non Blood In process     11/25/2020 1751 Blastomyces Agn Quant EIA Non Blood In process     11/25/2020 1751 Histoplasma capsulatum antigen In process     11/25/2020 1751 Angiotensin converting enzyme In process     11/24/2020 1304 Fine needle aspiration In process     11/24/2020 0710 FISH With Professional Interpretation In process     11/24/2020 0710 CHROMOSOME BONE MARROW With Professional Interpretation In process           Code Status   Full Code    Primary Care Physician   Physician No Ref-Primary  General: alert and cooperative, sitting in bed, no acute distress  HEENT: sclera anicteric, EOMI, MMM  Neck: supple, normal ROM  CV: RRR, normal S1/S2, no m/r/g  Resp: CTAB, no wheezing/crackles, normal respiratory effort on ambient air  GI: soft, non-tender, non-distended, bowel sounds present and normoactive  MSK: warm and well-perfused, no edema  Skin: no rashes on limited exam, no jaundice  Neuro: AOx3, moves all extremities equally, no focal deficits    Time Spent on this Encounter   IAsmita PA-C, personally saw the patient today and spent greater than 30 minutes discharging this patient.    Discharge Disposition   Discharged to home  Condition at discharge: Good    Consultations This Hospital Stay   INTERVENTIONAL RADIOLOGY ADULT/PEDS IP CONSULT  INTERVENTIONAL PULMONARY ADULT IP  CONSULT  OB GYN IP CONSULT  VASCULAR ACCESS CARE ADULT IP CONSULT  INFECTIOUS DISEASE TRANSPLANT HSCT/ HEME MALIG ADULT IP CONSULT  INFECTIOUS DISEASE GENERAL ADULT IP CONSULT    Discharge Orders      Rheumatology Referral      Infectious Disease Referral      Ob/Gyn Referral      Reason for your hospital stay    Evaluation of chest masses     Activity    Your activity upon discharge: regular as tolerated     When to contact your care team    Please contact your obstetrician for questions     Discharge Instructions    The lab is still working up your diagnosis. Please make an appointment with your obstetrician. They may need to arrange further labs pending the biopsy results.  We also referred you to a Rheumatologist for further evaluation. They will call you to arrange an appt. If you don't hear from them within 1 week or have ?s, the number is [phone]     Full Code     Diet    Follow this diet upon discharge: Regular     Discharge Medications   Discharge Medication List as of 11/25/2020  6:50 PM      START taking these medications    Details   Prenatal Vit-Fe Fumarate-FA (PRENATAL MULTIVITAMIN W/IRON) 27-0.8 MG tablet Take 1 tablet by mouth daily, Disp-30 tablet, R-0, E-Prescribe         CONTINUE these medications which have CHANGED    Details   benzonatate (TESSALON) 200 MG capsule Take 1 capsule (200 mg) by mouth 3 times daily as needed for cough, Disp-21 capsule, R-0, E-PrescribePlease fill STAT, pt leaving soon, thx!         CONTINUE these medications which have NOT CHANGED    Details   albuterol (PROAIR HFA/PROVENTIL HFA/VENTOLIN HFA) 108 (90 Base) MCG/ACT inhaler Inhale 2 puffs into the lungs every 6 hours as needed for shortness of breath / dyspnea or wheezing, Disp-1 Inhaler, R-0, Local Print           Allergies   No Known Allergies  Data   Most Recent 3 CBC's:  Recent Labs   Lab Test 11/25/20  0528 11/24/20  0543 11/23/20  1354   WBC 4.6 5.2 5.2   HGB 11.5* 11.9 12.6   MCV 88 88 89    418  447      Most Recent 3 BMP's:  Recent Labs   Lab Test 11/25/20  0528 11/24/20  0543 11/23/20  1354    137 135   POTASSIUM 3.6 3.5 3.7   CHLORIDE 110* 110* 107   CO2 21 20 23   BUN 7 9 9   CR 0.68 0.69 0.73   ANIONGAP 6 6 5   ANTON 8.6 9.0 9.0   GLC 86 82 73     Most Recent 2 LFT's:  Recent Labs   Lab Test 11/23/20  1354 11/20/20  0156   AST 16 12   ALT 28 24   ALKPHOS 75 65   BILITOTAL 0.5 0.3     Most Recent INR's and Anticoagulation Dosing History:  Anticoagulation Dose History     Recent Dosing and Labs Latest Ref Rng & Units 11/23/2020 11/24/2020 11/25/2020    INR 0.86 - 1.14 1.06 1.06 1.08        Most Recent 3 Troponin's:No lab results found.  Most Recent Cholesterol Panel:No lab results found.  Most Recent 6 Bacteria Isolates From Any Culture (See EPIC Reports for Culture Details):No lab results found.  Most Recent TSH, T4 and A1c Labs:No lab results found.  Results for orders placed or performed during the hospital encounter of 11/23/20   US Head Neck Soft Tissue    Narrative    EXAMINATION: TEMPORARY, 11/24/2020 8:58 AM     COMPARISON: No similar study. CT chest 11/20/2020.    HISTORY: 29-year-old female 5 weeks pregnant with new adenopathy,  workup for lymphoma.    TECHNIQUE: Sonographic imaging performed of the neck to evaluate for  lymph nodes using grey scale and limited Doppler technique.    FINDINGS:    Lymph nodes are measured bilaterally with measurements given in  transverse, AP, and craniocaudal dimensions as follows:    Right:  Level 1: Not visualized  Level 2: None  Level 3: 1.1 x 0.7 x 1.9 cm node with preserved central fatty hilum.  Level 4: 1.2 x 0.7 x 1.1 cm  Level 5: None  Level 6: None    Left:  Level 1: Not visualized  Level 2: None  Level 3: None  Level 4: None  Level 5: None  Level 6: None      Impression    IMPRESSION: Right level 3 and 4 lymph nodes, measuring up to 1.9 cm.    I have personally reviewed the examination and initial interpretation  and I agree with the  findings.    REGINA KRAMER MD   US OB 1st Trimester W Transvaginal W Doppler    Narrative    EXAMINATION: US OB 1ST TRIMESTER W TRANSVAGINAL W DOPPLER, 11/23/2020  4:31 PM     COMPARISON: None.    HISTORY: Patient reportedly 5 weeks pregnant with possible new  lymphoma, please assess viability of pregnancy. Gestational age 4  weeks 4 days from LMP 10/22/2020 with EDC 7/29/2021.    TECHNIQUE: The pelvis was scanned in standard fashion with  transabdominal and transvaginal transducer(s).    FINDINGS: There is a gestational sac within the uterine fundus is  irregular, measuring 0.5 x 0.6 x 0.8 cm. The endometrium measures 2.0  cm in thickness. There is no embryo or yolk sac present within the  gestational sac, which may be expected given LMP. Nabothian cysts in  the cervix.    There is no free fluid in the pelvis.  No uterine masses. The right  ovary measures 3.1 x 2.5 x 1.8 cm. The left ovary measures 2.8 x 1.9 x  1.7 cm. There is normal blood flow to the ovaries. Corpus luteum cyst  on the right measures 1.9 x 1.5 x 1.6 cm.      Impression    IMPRESSION: Gestational sac within the uterus measuring up to 8 mm. No  yolk sac or embryo is visible, which may be due to the early stage of  pregnancy. Recommend continued follow-up with beta hCG. Ultrasound  follow-up in 2 weeks or more will be diagnostic of pregnancy  viability/failure.    I have personally reviewed the examination and initial interpretation  and I agree with the findings.    RODOLFO KURTZ, DO   US Abdomen Complete    Narrative    EXAMINATION: US ABDOMEN COMPLETE,  11/24/2020 8:58 AM     COMPARISON: None.    HISTORY: 29-year-old female 5 weeks pregnant with new adenopathy,  workup for lymphoma.    TECHNIQUE: The abdomen was scanned in standard fashion with  specialized ultrasound transducer(s) using both gray-scale and limited  color Doppler techniques.    FINDINGS:  Liver: The liver demonstrates normal homogeneous echotexture. No  evidence of a focal  hepatic mass. The main portal vein is patent with  antegrade flow, measuring 12.8 cm.    Gallbladder:  There is no wall thickening, pericholecystic fluid,  positive sonographic Livingston's sign or evidence for cholelithiasis.    Bile Ducts: Both the intra- and extrahepatic biliary system are of  normal caliber.  The common bile duct measures 3 mm in diameter.    Pancreas: Visualized portions of the pancreas are unremarkable.     Kidneys: Both kidneys are of normal echotexture, without mass or  hydronephrosis.   The craniocaudal dimensions are: right- 9.5 cm,  left- 9.5 cm.    Spleen: The spleen is normal in size,  measuring 9.3 cm in sagittal  dimension.    Aorta and IVC: The visualized portions of the aorta and IVC are  unremarkable. The proximal aorta measures 1.8 cm in diameter and the  IVC measures 1.2 cm in diameter.    Fluid: No evidence of ascites or pleural effusions.      Impression    IMPRESSION: Normal abdominal ultrasound.     I have personally reviewed the examination and initial interpretation  and I agree with the findings.    REGINA KRAMER MD     I have seen, interviewed, and examined the patient independently.  I have reviewed the vital signs and labs.  This note reflects my assessment and plan.    We have spent greater than 30 minutes organizing outpatient care, follow up appointments, and medications.    I reviewed the biopsy with pathologist: they have plenty of material, they see no evidence of lymphoma, they see Granulomatous Lymphadenitis.    I reviewed with Rheum: they are happy to see her as outpatient and do not think this is urgent enough to warrant remaining in the hospital. Ok to discharge.    I reviewed with ID: also happy to see her as outpatient, no reason to keep her as inpt (given low risk of TB).    Path is adding stains for infectious causes, we ran a few rheum/ID labs as well to initiate work up.    If ID and Rheum work up totally negative, will then need LN biopsy to r/o Hodgkin  Lymphoma, but this is quite unlikely.    We instructed her to call or come to ED if she has SOB, HAs, lightheadedness, swelling  Elenita Zhang MD/PhD

## 2020-11-27 LAB
ACE SERPL-CCNC: 33 U/L (ref 9–67)
COPATH REPORT: NORMAL
INTERPRETATION ECG - MUSE: NORMAL

## 2020-11-30 ENCOUNTER — NURSE TRIAGE (OUTPATIENT)
Dept: CALL CENTER | Age: 30
End: 2020-11-30

## 2020-11-30 NOTE — PROGRESS NOTES
Rheumatology Clinic Visit-in person   Juventino Dickerson MRN# [id number]   YOB: 1990 Age: 29 year old     Date of Visit: 12/01/2020  Primary care provider: No Ref-Primary, Physician          Assessment and Plan:     # Chronic cough, orthopnea, nighttime sweats in a young woman:   Persistent cough has shown minimal improvement since hospital discharge 1 week ago. Pt relates the cough is worse at night time, accompanied with night sweats and orthopnea. Physical examination reveals normal vital signs and excellent airflow in the lungs. Laboratory evaluation of November 25 2020 showed a comprehensive metabolic panel entirely normal.  CRP was high at 14, and sed rate was elevated at 32, but LDH and glucose were normal.  CBC showed mildly decreased hemoglobin of 11.5 with normal hematocrit.  Fibrinogen was elevated at 472. ACE and urine calcium were normal. Bone marrow biopsy on November 24 showed no evidence for leukemia or lymphoma.    Fine-needle aspirate of mediastinal lymph node showed granulomatous inflammation with minimal necrosis; no evidence for malignancy. Special stains negative for fungi or acid-fast organisms.    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 Sarcoid could affect many organ systems including ocular, cutaneous, musculoskeletal, cardiac, and neurologic systems.  However, apart from orthopnea, neither the current symptom complex nor the work-up suggest presence of a significant extrapulmonary inflammatory process.     I recommend consideration of moderate dose corticosteroids (prednisone, 40 mg daily) to treat significant pulmonary symptoms.  Before initiating treatment, I recommend:  A) consultation with infectious disease regarding the possibility of opportunistic mycobacteria or fungal infections. Test for MTb  exposure with interferon gamma testing  B) repeat inflammatory markers to gauge likelihood of progressive systemic inflammatory response.  C) pursue abnormal EKG: Patient relates persistent nocturnal orthopnea. November 25 EKG showed findings of ST segment abnormalities and possible left atrial enlargement.  As sarcoidosis can cause myocarditis, I recommend initial evaluation with echocardiogram.  If wall motion abnormalities or chamber enlargement are noted, further imaging with cardiac MRI to investigate potential myocardial involvement will be performed, and cardiology will be consulted  D) Initial OG/Gyn evaluation: by patient report, she is at 6 weeks' gestation. Previous OB/[place] care for her daughter and [place] [facility] [place]  We will refer to OB/GYN; pregnancy must be considered when pursuing additional diagnostic procedures that involve radiation  E) Evaluate Vitamin D status: Neither hypercalcemia nor hypercalciuria are present.  Levels of both in active and active vitamin D should be measured.  F) consider subclinical ocular involvement with a low threshold for ophthalmology evaluation to detect uveitis.  G) Followup with Dr. Kramer of pulmonology to assess need for pulmonary function testing    While awaiting completion of work-up, I recommend continued use of albuterol inhaler, 2 puffs every 4 hours at night as needed.    Return to clinic in 2 weeks; corticosteroid treatment can start as soon as OB and ID evaluations are complete.    I was present with the medical student who participated in the service and in the documentation of the note. I have verified the history and personally performed the physical exam and medical decision making. I agree with the assessment and plan of care as documented in the note.    Dimitri Riggins M.D.  Staff Rheumatologist,  Health  Pager [phone]            Active Problem List:     Patient Active Problem List    Diagnosis Date Noted     Lymphadenopathy, mediastinal  11/24/2020     Priority: Medium     Mediastinal mass 11/23/2020     Priority: Medium            History of Present Illness:   Juventino Dickerson presents for evaluation of cough and mediastinal mass, referred by Dr. Elenita Zhang.    Patient was hospitalized 11-23 through 11- for evaluation of cough and mediastinal mass. Imaging (helical CT scan)  had shown mediastinal mass compressing the SVC anteriorly. She underwent EBUS/TBNA and BMBx on 11/24 during admission. Transbronchial biopsy showed granulomatous lymphadenitis. Further workup was begun to investigate infectious causes, rheumatologic disease, and lymphoma with ACE, urine calcium, histo, blasto, ESR, CRP. she was discharged 11/25.    Interval History 12/1/20  Her cough has been persistent for a month now and she reports minimal improvement since her hospital discharge. Her use of albuterol hasn't provided much symptomatic relief. Accompanied with her cough, she has had some diffuse R shoulder/chest pain but no other joint pain is present. Pt relates that the difficulty breathing and cough are worse at night when she is sleeping. She notes having some orthopnea and night sweats. Otherwise, pt denies having any fevers, chills, and rashes, dysuria, or discoloration of her skin.    According to patient, she is now 6 weeks into her pregnancy. She does not yet have an OB/GYN provider set up, but [place] [facility] [place] interest in returning.    Ariel Delcid MS3         Review of Systems:       Constitutional: Nocturnal sweats for 1 month.  Skin: negative  Eyes: negative. No dry eyes  Ears/Nose/Throat: negative.  Respiratory: shortness of breath, dry cough, worse at night time. Orthopnea  Cardiovascular: negative  Gastrointestinal: daily nausea/vomitting during pregnancy  Genitourinary: negative  Musculoskeletal: R shoulder pain but no other joint pain present  Neurologic: negative  Psychiatric:  negative  Hematologic/Lymphatic/Immunologic: negative  Endocrine: negative          Past Medical History:   No past medical history on file.  Past Surgical History:   Procedure Laterality Date     BRONCHOSCOPY RIDID OR FLEXIBLE W/ENDOBRONCHIAL ULTRASOUND GUIDED 3 OR MORE NODE STATIONS N/A 11/24/2020    Procedure: BRONCHOSCOPY, WITH BIOPSY OF 3 OR MORE LYMPH NODE STATIONS WITH ENDOBRONCHIAL ULTRASOUND GUIDANCE;  Surgeon: Kevin Kramer MD;  Location: [facility]     --pregnancy : LMP 10/22/2020 with EDC 7/29/2021         Social History:     Social History   Lives with 9 year old daughter and 2 year old son in [place]. Currently not working.    Occupational History     Not on file   Tobacco Use     Smoking status: 3ppd since early 20s. Quit one week ago.     Smokeless tobacco: Never Used   Substance and Sexual Activity     Alcohol use: Last drink one month ago     Drug use: None     Sexual activity: Not on file     Tobacco: 3 ppd X years, no smoking for 1 week.  No recent use of inhaled substances other than tobacco, no exposures to rural areas, farhat, or recent out of state travel.       Family History:   Father - Diabetes           Allergies:   No Known Allergies         Medications:     Current Outpatient Medications   Medication Sig Dispense Refill     albuterol (PROAIR HFA/PROVENTIL HFA/VENTOLIN HFA) 108 (90 Base) MCG/ACT inhaler Inhale 2 puffs into the lungs every 6 hours as needed for shortness of breath / dyspnea or wheezing 1 Inhaler 0     benzonatate (TESSALON) 200 MG capsule Take 1 capsule (200 mg) by mouth 3 times daily as needed for cough 21 capsule 0     Prenatal Vit-Fe Fumarate-FA (PRENATAL MULTIVITAMIN W/IRON) 27-0.8 MG tablet Take 1 tablet by mouth daily 30 tablet 0            Physical Exam:   Last menstrual period 10/17/2020.  Wt Readings from Last 4 Encounters:   11/25/20 75.3 kg (165 lb 14.4 oz)   11/19/20 77.1 kg (170 lb)       Constitutional: well-developed, appearing stated age;  cooperative, tearful at times during interview  Eyes: nl EOM, PERRLA, vision, conjunctiva, sclera  ENT: nl external ears, nose, hearing, lips, teeth, gums, throat  No mucous membrane lesions, normal saliva pool  Neck: no mass or thyroid enlargement  Resp: lungs clear to auscultation, no wheezing or rhonchi  CV: RRR, no murmurs, rubs or gallops, no edema  GI: no ABD mass or tenderness, no HSM  : not tested  Lymph: no cervical, supraclavicular, axillary epitrochlear nodes  MS: The TMJ, neck, shoulder, elbow, wrist, MCP/PIP/DIP, spine, hip, knee, ankle, and foot MTP/IP joints were examined. No active synovitis or altered joint anatomy. Full joint ROM. Normal  strength. No dactylitis,  tenosynovitis, enthesopathy.  Skin: no nail pitting, alopecia, rash, nodules or lesions  Neuro: nl cranial nerves, strength, sensation, DTRs.   Psych: nl judgement, orientation, memory, affect.         Data:     No results found for any visits on 12/01/20.  RHEUM RESULTS Latest Ref Rng & Units 11/23/2020 11/24/2020 11/25/2020   SED RATE 0 - 20 mm/h - - 32(H)   CRP, INFLAMMATION 0.0 - 8.0 mg/L - - 14.0(H)   AST 0 - 45 U/L 16 - -   ALT 0 - 50 U/L 28 - -   ALBUMIN 3.4 - 5.0 g/dL 4.0 - -   WBC 4.0 - 11.0 10e9/L 5.2 5.2 4.6   RBC 3.8 - 5.2 10e12/L 4.42 4.18 4.06   HGB 11.7 - 15.7 g/dL 12.6 11.9 11.5(L)   HCT 35.0 - 47.0 % 39.3 36.8 35.9   MCV 78 - 100 fl 89 88 88   MCHC 31.5 - 36.5 g/dL 32.1 32.3 32.0   RDW 10.0 - 15.0 % 14.8 14.7 14.5    - 450 10e9/L 447 418 382   CREATININE 0.52 - 1.04 mg/dL 0.73 0.69 0.68   GFR ESTIMATE, IF BLACK >60 mL/min/[1.73:m2] >90 >90 >90   GFR ESTIMATE >60 mL/min/[1.73:m2] >90 >90 >90        ,  ,  ,  ,  ,  ,  ,  ,  ,  ,  ,  ,  ,  ,   Hepatitis B Core Marci   Date Value Ref Range Status   11/23/2020 Nonreactive NR^Nonreactive Final   ,   Hep B Surface Agn   Date Value Ref Range Status   11/23/2020 Nonreactive NR^Nonreactive Final

## 2020-11-30 NOTE — TELEPHONE ENCOUNTER
Connected with patient and offered an appointment 12/1/2020 with Dr. Riggins, patient accepted.     Ronda Hartman CMA   11/30/2020 11:01 AM

## 2020-11-30 NOTE — TELEPHONE ENCOUNTER
"  Answer Assessment - Initial Assessment Questions  1. ONSET: \"When did the cough begin?\"       Nov 1st  2. SEVERITY: \"How bad is the cough today?\"       Frequently through out the hour  3. RESPIRATORY DISTRESS: \"Describe your breathing.\"         Sleeping : lying flat, coughing, breathing-heavy breathing, sounds wheezy  Sitting gets better  4. FEVER: \"Do you have a fever?\" If so, ask: \"What is your temperature, how was it measured, and when did it start?\"      No- denies feeling feverish  5. HEMOPTYSIS: \"Are you coughing up any blood?\" If so ask: \"How much?\" (flecks, streaks, tablespoons, etc.)      Cough up white to yellow, continuous  6. TREATMENT: \"What have you done so far to treat the cough?\" (e.g., meds, fluids, humidifier)      Pills-benvonatate, Not helping   7. CARDIAC HISTORY: \"Do you have any history of heart disease?\" (e.g., heart attack, congestive heart failure)       no  8. LUNG HISTORY: \"Do you have any history of lung disease?\"  (e.g., pulmonary embolus, asthma, emphysema)      no  9. PE RISK FACTORS: \"Do you have a history of blood clots?\" (or: recent major surgery, recent prolonged travel, bedridden)      no  10. OTHER SYMPTOMS: \"Do you have any other symptoms? (e.g., runny nose, wheezing, chest pain)        Pain with coughing - chest area,  Jaw area- comes and goes, Denies any pain to arm area  Pain- 7- only after cough  wheezing  11. PREGNANCY: \"Is there any chance you are pregnant?\" \"When was your last menstrual period?\"        Yes- 5 wees  12. TRAVEL: \"Have you traveled out of the country in the last month?\" (e.g., travel history, exposures)      no    Protocols used: COUGH-A-OH    [facility]: Nurse Triage Note  SITUATION/BACKGROUND                                                      Juventino Dickerson is a 29 year old female who calls with cough.  Pt recently discharged from hospital.  Cold transfer.  Calling to set up appt and would like be seen and treated.  Per hospital " discharge: referral for ID, OB, Rheum  Pt does not have PCP- establish with OB.      RECOMMENDATION/PLAN                                                      RECOMMENDED DISPOSITION: Will send high priority note onto OB, Rheum, ID to see if Appt can be made ASAP.  Pt advised to seek urgent/emergent care for any severe chest pain, increasing cough or breathing difficultly. Reviewed home care instruction.  Will comply with recommendation: Yes    If further questions/concerns or if symptoms do not improve, worsen or new symptoms develop, call your PCP or [phone] to talk with the Resident on call, as soon as possible.    Guideline used: cough  Telephone Triage Protocols for Nurses, Fifth Edition, Kay Reeder RN

## 2020-12-01 ENCOUNTER — APPOINTMENT (OUTPATIENT)
Dept: LAB | Facility: CLINIC | Age: 30
End: 2020-12-01
Payer: MEDICARE

## 2020-12-01 ENCOUNTER — OFFICE VISIT (OUTPATIENT)
Dept: RHEUMATOLOGY | Facility: CLINIC | Age: 30
End: 2020-12-01
Attending: INTERNAL MEDICINE
Payer: MEDICARE

## 2020-12-01 VITALS
SYSTOLIC BLOOD PRESSURE: 106 MMHG | HEART RATE: 91 BPM | WEIGHT: 168.3 LBS | OXYGEN SATURATION: 99 % | TEMPERATURE: 98.5 F | BODY MASS INDEX: 31.8 KG/M2 | DIASTOLIC BLOOD PRESSURE: 72 MMHG

## 2020-12-01 DIAGNOSIS — J98.59 MEDIASTINAL MASS: ICD-10-CM

## 2020-12-01 LAB
ALBUMIN UR-MCNC: NEGATIVE MG/DL
APPEARANCE UR: ABNORMAL
BILIRUB UR QL STRIP: NEGATIVE
COLOR UR AUTO: YELLOW
CRP SERPL-MCNC: 8.3 MG/L (ref 0–8)
DEPRECATED CALCIDIOL+CALCIFEROL SERPL-MC: 11 UG/L (ref 20–75)
ERYTHROCYTE [DISTWIDTH] IN BLOOD BY AUTOMATED COUNT: 14.4 % (ref 10–15)
ERYTHROCYTE [SEDIMENTATION RATE] IN BLOOD BY WESTERGREN METHOD: 41 MM/H (ref 0–20)
GLUCOSE UR STRIP-MCNC: NEGATIVE MG/DL
HCT VFR BLD AUTO: 37.3 % (ref 35–47)
HGB BLD-MCNC: 12.2 G/DL (ref 11.7–15.7)
HGB UR QL STRIP: NEGATIVE
KETONES UR STRIP-MCNC: 5 MG/DL
LEUKOCYTE ESTERASE UR QL STRIP: ABNORMAL
MCH RBC QN AUTO: 28.8 PG (ref 26.5–33)
MCHC RBC AUTO-ENTMCNC: 32.7 G/DL (ref 31.5–36.5)
MCV RBC AUTO: 88 FL (ref 78–100)
MUCOUS THREADS #/AREA URNS LPF: PRESENT /LPF
NITRATE UR QL: NEGATIVE
PH UR STRIP: 6 PH (ref 5–7)
PLATELET # BLD AUTO: 395 10E9/L (ref 150–450)
RBC # BLD AUTO: 4.24 10E12/L (ref 3.8–5.2)
RBC #/AREA URNS AUTO: 1 /HPF (ref 0–2)
SOURCE: ABNORMAL
SP GR UR STRIP: 1.03 (ref 1–1.03)
SQUAMOUS #/AREA URNS AUTO: 5 /HPF (ref 0–1)
UROBILINOGEN UR STRIP-MCNC: 0 MG/DL (ref 0–2)
WBC # BLD AUTO: 4.8 10E9/L (ref 4–11)
WBC #/AREA URNS AUTO: 2 /HPF (ref 0–5)

## 2020-12-01 PROCEDURE — 85027 COMPLETE CBC AUTOMATED: CPT | Performed by: INTERNAL MEDICINE

## 2020-12-01 PROCEDURE — 36415 COLL VENOUS BLD VENIPUNCTURE: CPT | Performed by: INTERNAL MEDICINE

## 2020-12-01 PROCEDURE — 86481 TB AG RESPONSE T-CELL SUSP: CPT | Performed by: INTERNAL MEDICINE

## 2020-12-01 PROCEDURE — 99205 OFFICE O/P NEW HI 60 MIN: CPT | Performed by: INTERNAL MEDICINE

## 2020-12-01 PROCEDURE — 85652 RBC SED RATE AUTOMATED: CPT | Performed by: INTERNAL MEDICINE

## 2020-12-01 PROCEDURE — 86140 C-REACTIVE PROTEIN: CPT | Performed by: INTERNAL MEDICINE

## 2020-12-01 PROCEDURE — 81001 URINALYSIS AUTO W/SCOPE: CPT | Performed by: INTERNAL MEDICINE

## 2020-12-01 PROCEDURE — 82306 VITAMIN D 25 HYDROXY: CPT | Performed by: INTERNAL MEDICINE

## 2020-12-01 ASSESSMENT — PAIN SCALES - GENERAL: PAINLEVEL: NO PAIN (0)

## 2020-12-01 NOTE — TELEPHONE ENCOUNTER
RECORDS RECEIVED FROM: Internal   DATE RECEIVED: 12.14.2020    NOTES (Gather within 2 years) STATUS DETAILS   OFFICE NOTE from referring provider   Internal 11.23.20220 Asmita Shelton PA-C   OFFICE NOTE from other specialist N/A    DISCHARGE SUMMARY from hospital N/A    DISCHARGE REPORT from the ER N/A    LABS (any labs) Internal / CE    MEDICATION LIST Internal / CE    IMAGING  (NEED IMAGES AND REPORTS)     Osteomyelitis: Foot imaging  N/A    Liver Abscess: Abdominal imaging N/A    Other (anything related to diagnoses Internal

## 2020-12-01 NOTE — LETTER
12/1/2020       RE: Juventino Dickerson  [address]  [facility] [address]     Dear Colleague,    Thank you for referring your patient, Juventino Dickerson, to the [facility] [place] at [facility]. Please see a copy of my visit note below.    Rheumatology Clinic Visit-in person   Juventino Dickerson MRN# [id number]   YOB: 1990 Age: 29 year old     Date of Visit: 12/01/2020  Primary care provider: No Ref-Primary, Physician          Assessment and Plan:     # Chronic cough, orthopnea, nighttime sweats in a young woman:   Persistent cough has shown minimal improvement since hospital discharge 1 week ago. Pt relates the cough is worse at night time, accompanied with night sweats and orthopnea. Physical examination reveals normal vital signs and excellent airflow in the lungs. Laboratory evaluation of November 25 2020 showed a comprehensive metabolic panel entirely normal.  CRP was high at 14, and sed rate was elevated at 32, but LDH and glucose were normal.  CBC showed mildly decreased hemoglobin of 11.5 with normal hematocrit.  Fibrinogen was elevated at 472. ACE and urine calcium were normal. Bone marrow biopsy on November 24 showed no evidence for leukemia or lymphoma.    Fine-needle aspirate of mediastinal lymph node showed granulomatous inflammation with minimal necrosis; no evidence for malignancy. Special stains negative for fungi or acid-fast organisms.    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 Sarcoid could affect many organ systems including ocular, cutaneous, musculoskeletal, cardiac, and neurologic systems.  However, apart from orthopnea, neither the current symptom complex nor the work-up suggest presence of a significant extrapulmonary inflammatory  process.     I recommend consideration of moderate dose corticosteroids (prednisone, 40 mg daily) to treat significant pulmonary symptoms.  Before initiating treatment, I recommend:  A) consultation with infectious disease regarding the possibility of opportunistic mycobacteria or fungal infections. Test for MTb exposure with interferon gamma testing  B) repeat inflammatory markers to gauge likelihood of progressive systemic inflammatory response.  C) pursue abnormal EKG: Patient relates persistent nocturnal orthopnea. November 25 EKG showed findings of ST segment abnormalities and possible left atrial enlargement.  As sarcoidosis can cause myocarditis, I recommend initial evaluation with echocardiogram.  If wall motion abnormalities or chamber enlargement are noted, further imaging with cardiac MRI to investigate potential myocardial involvement will be performed, and cardiology will be consulted  D) Initial OG/Gyn evaluation: by patient report, she is at 6 weeks' gestation. Previous [place] [facility] [place]  We will refer to OB/GYN; pregnancy must be considered when pursuing additional diagnostic procedures that involve radiation  E) Evaluate Vitamin D status: Neither hypercalcemia nor hypercalciuria are present.  Levels of both in active and active vitamin D should be measured.  F) consider subclinical ocular involvement with a low threshold for ophthalmology evaluation to detect uveitis.  G) Followup with Dr. Kramer of pulmonology to assess need for pulmonary function testing    While awaiting completion of work-up, I recommend continued use of albuterol inhaler, 2 puffs every 4 hours at night as needed.    Return to clinic in 2 weeks; corticosteroid treatment can start as soon as OB and ID evaluations are complete.    I was present with the medical student who participated in the service and in the documentation of the note. I have verified the history and personally performed  the physical exam and medical decision making. I agree with the assessment and plan of care as documented in the note.    Dimitri Riggins M.D.  Staff Rheumatologist,  Health  Pager [phone]            Active Problem List:     Patient Active Problem List    Diagnosis Date Noted     Lymphadenopathy, mediastinal 11/24/2020     Priority: Medium     Mediastinal mass 11/23/2020     Priority: Medium            History of Present Illness:   Juventino Dickerson presents for evaluation of cough and mediastinal mass, referred by Dr. Elenita Zhang.    Patient was hospitalized 11-23 through 11- for evaluation of cough and mediastinal mass. Imaging (helical CT scan)  had shown mediastinal mass compressing the SVC anteriorly. She underwent EBUS/TBNA and BMBx on 11/24 during admission. Transbronchial biopsy showed granulomatous lymphadenitis. Further workup was begun to investigate infectious causes, rheumatologic disease, and lymphoma with ACE, urine calcium, histo, blasto, ESR, CRP. she was discharged 11/25.    Interval History 12/1/20  Her cough has been persistent for a month now and she reports minimal improvement since her hospital discharge. Her use of albuterol hasn't provided much symptomatic relief. Accompanied with her cough, she has had some diffuse R shoulder/chest pain but no other joint pain is present. Pt relates that the difficulty breathing and cough are worse at night when she is sleeping. She notes having some orthopnea and night sweats. Otherwise, pt denies having any fevers, chills, and rashes, dysuria, or discoloration of her skin.    According to patient, she is now 6 weeks into her pregnancy. She does not yet have an OB/GYN provider set up, but her previous care was provided at [facility] [place] and expressed interest in returning.    Ariel Delcid MS3         Review of Systems:       Constitutional: Nocturnal sweats for 1 month.  Skin: negative  Eyes: negative. No dry eyes  Ears/Nose/Throat: negative.  Respiratory:  shortness of breath, dry cough, worse at night time. Orthopnea  Cardiovascular: negative  Gastrointestinal: daily nausea/vomitting during pregnancy  Genitourinary: negative  Musculoskeletal: R shoulder pain but no other joint pain present  Neurologic: negative  Psychiatric: negative  Hematologic/Lymphatic/Immunologic: negative  Endocrine: negative          Past Medical History:   No past medical history on file.  Past Surgical History:   Procedure Laterality Date     BRONCHOSCOPY RIDID OR FLEXIBLE W/ENDOBRONCHIAL ULTRASOUND GUIDED 3 OR MORE NODE STATIONS N/A 11/24/2020    Procedure: BRONCHOSCOPY, WITH BIOPSY OF 3 OR MORE LYMPH NODE STATIONS WITH ENDOBRONCHIAL ULTRASOUND GUIDANCE;  Surgeon: Kevin Kramer MD;  Location:  GI     --pregnancy : LMP 10/22/2020 with EDC 7/29/2021         Social History:     Social History   Lives with 9 year old daughter and 2 year old son in [place]. Currently not working.    Occupational History     Not on file   Tobacco Use     Smoking status: 3ppd since early 20s. Quit one week ago.     Smokeless tobacco: Never Used   Substance and Sexual Activity     Alcohol use: Last drink one month ago     Drug use: None     Sexual activity: Not on file     Tobacco: 3 ppd X years, no smoking for 1 week.  No recent use of inhaled substances other than tobacco, no exposures to rural areas, farhat, or recent out of state travel.       Family History:   Father - Diabetes           Allergies:   No Known Allergies         Medications:     Current Outpatient Medications   Medication Sig Dispense Refill     albuterol (PROAIR HFA/PROVENTIL HFA/VENTOLIN HFA) 108 (90 Base) MCG/ACT inhaler Inhale 2 puffs into the lungs every 6 hours as needed for shortness of breath / dyspnea or wheezing 1 Inhaler 0     benzonatate (TESSALON) 200 MG capsule Take 1 capsule (200 mg) by mouth 3 times daily as needed for cough 21 capsule 0     Prenatal Vit-Fe Fumarate-FA (PRENATAL MULTIVITAMIN W/IRON) 27-0.8  MG tablet Take 1 tablet by mouth daily 30 tablet 0            Physical Exam:   Last menstrual period 10/17/2020.  Wt Readings from Last 4 Encounters:   11/25/20 75.3 kg (165 lb 14.4 oz)   11/19/20 77.1 kg (170 lb)       Constitutional: well-developed, appearing stated age; cooperative, tearful at times during interview  Eyes: nl EOM, PERRLA, vision, conjunctiva, sclera  ENT: nl external ears, nose, hearing, lips, teeth, gums, throat  No mucous membrane lesions, normal saliva pool  Neck: no mass or thyroid enlargement  Resp: lungs clear to auscultation, no wheezing or rhonchi  CV: RRR, no murmurs, rubs or gallops, no edema  GI: no ABD mass or tenderness, no HSM  : not tested  Lymph: no cervical, supraclavicular, axillary epitrochlear nodes  MS: The TMJ, neck, shoulder, elbow, wrist, MCP/PIP/DIP, spine, hip, knee, ankle, and foot MTP/IP joints were examined. No active synovitis or altered joint anatomy. Full joint ROM. Normal  strength. No dactylitis,  tenosynovitis, enthesopathy.  Skin: no nail pitting, alopecia, rash, nodules or lesions  Neuro: nl cranial nerves, strength, sensation, DTRs.   Psych: nl judgement, orientation, memory, affect.         Data:     No results found for any visits on 12/01/20.  RHEUM RESULTS Latest Ref Rng & Units 11/23/2020 11/24/2020 11/25/2020   SED RATE 0 - 20 mm/h - - 32(H)   CRP, INFLAMMATION 0.0 - 8.0 mg/L - - 14.0(H)   AST 0 - 45 U/L 16 - -   ALT 0 - 50 U/L 28 - -   ALBUMIN 3.4 - 5.0 g/dL 4.0 - -   WBC 4.0 - 11.0 10e9/L 5.2 5.2 4.6   RBC 3.8 - 5.2 10e12/L 4.42 4.18 4.06   HGB 11.7 - 15.7 g/dL 12.6 11.9 11.5(L)   HCT 35.0 - 47.0 % 39.3 36.8 35.9   MCV 78 - 100 fl 89 88 88   MCHC 31.5 - 36.5 g/dL 32.1 32.3 32.0   RDW 10.0 - 15.0 % 14.8 14.7 14.5    - 450 10e9/L 447 418 382   CREATININE 0.52 - 1.04 mg/dL 0.73 0.69 0.68   GFR ESTIMATE, IF BLACK >60 mL/min/[1.73:m2] >90 >90 >90   GFR ESTIMATE >60 mL/min/[1.73:m2] >90 >90 >90        ,  ,  ,  ,  ,  ,  ,  ,  ,  ,  ,  ,  ,  ,    Hepatitis B Core Marci   Date Value Ref Range Status   11/23/2020 Nonreactive NR^Nonreactive Final   ,   Hep B Surface Agn   Date Value Ref Range Status   11/23/2020 Nonreactive NR^Nonreactive Final           Again, thank you for allowing me to participate in the care of your patient.      Sincerely,    Dimitri Riggins MD

## 2020-12-01 NOTE — PATIENT INSTRUCTIONS
Diagnosis:  1.  Chronic cough, difficult nighttime breathing, nighttime sweats: Imaging shows some enlarged lymph nodes, and biopsy of the lymph nodes shows inflammation.  Inflammation could be due to infection (for example with tuberculosis bacteria), but is most likely due to sarcoidosis.  Sarcoidosis has no known cause, but treatment is generally quite effective.  Plan to investigate possible infection with a visit to the infectious disease doctor, complete a few more lab tests and an echocardiogram, and then discuss treatment.  Likely treatment will include the medicine prednisone, a medicine you can take by mouth.  2.  Lab work and echocardiogram will be ordered today.  When results are complete from these tests, treatment may be given.  3.  While waiting for results, continue to use the albuterol inhaler, 2 puffs at night up to every 4-6 hours, or as needed for shortness of breath or wheezing.

## 2020-12-01 NOTE — NURSING NOTE
"Chief Complaint   Patient presents with     Consult     Mediastinal Mass     Vital signs:  Temp: 98.5  F (36.9  C)   BP: 106/72 Pulse: 91     SpO2: 99 %       Weight: 76.3 kg (168 lb 4.8 oz)  Estimated body mass index is 31.8 kg/m  as calculated from the following:    Height as of 11/23/20: 1.549 m (5' 1\").    Weight as of this encounter: 76.3 kg (168 lb 4.8 oz).        Emi Lainez, YARIEL    "

## 2020-12-02 ENCOUNTER — PRE VISIT (OUTPATIENT)
Dept: PULMONOLOGY | Facility: CLINIC | Age: 30
End: 2020-12-02

## 2020-12-02 ENCOUNTER — VIRTUAL VISIT (OUTPATIENT)
Dept: PULMONOLOGY | Facility: CLINIC | Age: 30
End: 2020-12-02
Attending: EMERGENCY MEDICINE
Payer: MEDICARE

## 2020-12-02 ENCOUNTER — TELEPHONE (OUTPATIENT)
Dept: RHEUMATOLOGY | Facility: CLINIC | Age: 30
End: 2020-12-02

## 2020-12-02 ENCOUNTER — TELEPHONE (OUTPATIENT)
Dept: OBGYN | Facility: CLINIC | Age: 30
End: 2020-12-02

## 2020-12-02 DIAGNOSIS — J98.59 MEDIASTINAL MASS: Primary | ICD-10-CM

## 2020-12-02 DIAGNOSIS — R59.0 MEDIASTINAL LYMPHADENOPATHY: ICD-10-CM

## 2020-12-02 DIAGNOSIS — J84.9 ILD (INTERSTITIAL LUNG DISEASE) (H): Primary | ICD-10-CM

## 2020-12-02 DIAGNOSIS — R59.0 LYMPHADENOPATHY, MEDIASTINAL: ICD-10-CM

## 2020-12-02 LAB
GAMMA INTERFERON BACKGROUND BLD IA-ACNC: 0.11 IU/ML
LAB SCANNED RESULT: NORMAL
LAB SCANNED RESULT: NORMAL
M TB IFN-G CD4+ BCKGRND COR BLD-ACNC: 8.61 IU/ML
M TB TUBERC IFN-G BLD QL: NEGATIVE
MITOGEN IGNF BCKGRD COR BLD-ACNC: 0.01 IU/ML
MITOGEN IGNF BCKGRD COR BLD-ACNC: 0.01 IU/ML

## 2020-12-02 PROCEDURE — 99214 OFFICE O/P EST MOD 30 MIN: CPT | Mod: 95 | Performed by: INTERNAL MEDICINE

## 2020-12-02 PROCEDURE — 999N001193 HC VIDEO/TELEPHONE VISIT; NO CHARGE

## 2020-12-02 NOTE — TELEPHONE ENCOUNTER
Called to schedule appointment in [facility].   Was told they will call Juventino to schedule appointment.    Referral has been placed.     Shawnee Wall MSN, RN  Rheumatology RN Care Coordinator  [facility]

## 2020-12-02 NOTE — TELEPHONE ENCOUNTER
----- Message from Mariluz Barnard MD sent at 12/1/2020  3:27 PM CST -----  Regarding: RE: STAT Referral  Michele Campo,     I think it would be fine for her to have an intake with the midwives, but should prob have MD care - she has a new diagnosis of sarcoidosis.    She should also have an ultrasound to demonstrate viability - no fetal pole was seen on the first US.    Let me know if you have other questions.     JR  ----- Message -----  From: Jahaira Stark RN  Sent: 12/1/2020  12:52 PM CST  To: Mariluz Barnard MD, [facility]  Subject: FW: STAT Referral                                Let us know who she should see and when! Thanks! Reply to RN pool thanks!    -Jahaira  ----- Message -----  From: Rhoda Cool  Sent: 12/1/2020  11:29 AM CST  To: [facility]  Subject: FW: STAT Referral                                  ----- Message -----  From: Sandy Hall  Sent: 12/1/2020   9:53 AM CST  To: [facility]  [facility], #  Subject: STAT Referral                                     Health Call Center    Phone Message    May a detailed message be left on voicemail: yes     Reason for Call: Appointment Intake    Referring Provider Name: Asmita Shelton PA-C    Diagnosis and/or Symptoms:   Pregnancy test positive [Z32.01]  Mediastinal mass [J98.59]  Lymphadenopathy, mediastinal [R59.0]    Per referral comment:  STAT consult for 29 year old, 5-weeks pregnant, with new mediastinal and hilar adenopathy concerning for infection vs sarcoid.      Sandy Chao

## 2020-12-02 NOTE — PROGRESS NOTES
"Juventino Dickerson is a 29 year old female who is being evaluated via a billable video visit.      The patient has been notified of following:     \"This video visit will be conducted via a call between you and your physician/provider. We have found that certain health care needs can be provided without the need for an in-person physical exam.  This service lets us provide the care you need with a video conversation.  If a prescription is necessary we can send it directly to your pharmacy.  If lab work is needed we can place an order for that and you can then stop by our lab to have the test done at a later time.    Video visits are billed at different rates depending on your insurance coverage.  Please reach out to your insurance provider with any questions.    If during the course of the call the physician/provider feels a video visit is not appropriate, you will not be charged for this service.\"    Patient has given verbal consent for Video visit? Yes  How would you like to obtain your AVS? Mail a copy  If you are dropped from the video visit, the video invite should be resent to: Text to cell phone: [phone]  Will anyone else be joining your video visit? No        Video-Visit Details    Type of service:  Video Visit    Video time: 25 minutes    Originating Location (pt. Location): Home    Distant Location (provider location):  [facility]     Platform used for Video Visit: Dick or Bro        I have reviewed and updated the patient's allergies and medication list. Patient was asked if they had any patient reported vital signs to present, if yes, please see documented vitals.  Patient was also asked for their current weight and height, if presented, documented in vitals.    Concerns: New pt.    Refills: No refills    Sarah Saavedra CMA      [facility]  Interventional Pulmonary Clinic Virtual Visit Note    December 2, 2020    Chief complaint:  Juventino Dickerson is a 29 year old female seen for "   Chief Complaint   Patient presents with     Video Visit     New; Mediastinal Lymphadenopathy       Reason for clinic visit / Chief complaint:   Mediastinal/hilar lymph node enlargements    Assessment and Plan:  Mediastinal/hilar lymph node enlargements, noncaseating granulomas on biopsies (EBUS) likely suggesting sarcoidosis.  She has no other manifestations by clinical history.  Her QuantiFERON and fungal serologies pending however cultures (biopsy) have been negative for infectious etiology.  I will have her see one of our sarcoidosis specialists in our division.  6 weeks of pregnancy.  I strongly suggested her to find an OB physician for further follow-up especially if she needs treatment for sarcoidosis it needs to be arranged with them.  I indicated to her that two thirds of the time sarcoidosis symptoms may improve during pregnancy however there are potential complications such as increased risk of thromboembolic disease, preeclampsia and  birth.    History of Present Illness:  This is a 29 years old woman who was recently hospitalized because of dyspnea and found to have large mediastinal/hilar lymph node enlargements.  She has no history of vision problems, skin rash, joint problems or any other organ issues by history.  Initially she was told to have lymphoma and work-up included EBUS guided mediastinal lymph node biopsies as well as bone marrow biopsy.  Flow cytometry did not reveal changes suggesting lymphoma.  There are multiple noncaseating granulomas in her mediastinal lymph node biopsies.  Stains and cultures have been negative so far.  She is discharged home and has no dyspnea anymore.  I interviewed her on video today and her mom was also attended with her phone.  Patient is 6 weeks of pregnant and has no OB physician yet.  No history of sarcoidosis in her family.       No Known Allergies     PMH: none    Past Surgical History:   Procedure Laterality Date     BRONCHOSCOPY RIDID OR FLEXIBLE  W/ENDOBRONCHIAL ULTRASOUND GUIDED 3 OR MORE NODE STATIONS N/A 11/24/2020    Procedure: BRONCHOSCOPY, WITH BIOPSY OF 3 OR MORE LYMPH NODE STATIONS WITH ENDOBRONCHIAL ULTRASOUND GUIDANCE;  Surgeon: Kevin Kramer MD;  Location: [facility]        Social History     Socioeconomic History     Marital status: Single     Spouse name: Not on file     Number of children: Not on file     Years of education: Not on file     Highest education level: Not on file   Occupational History     Not on file   Social Needs     Financial resource strain: Not on file     Food insecurity     Worry: Not on file     Inability: Not on file     Transportation needs     Medical: Not on file     Non-medical: Not on file   Tobacco Use     Smoking status: Never Smoker     Smokeless tobacco: Never Used   Substance and Sexual Activity     Alcohol use: Not on file     Drug use: Not on file     Sexual activity: Not on file   Lifestyle     Physical activity     Days per week: Not on file     Minutes per session: Not on file     Stress: Not on file   Relationships     Social connections     Talks on phone: Not on file     Gets together: Not on file     Attends Rastafari service: Not on file     Active member of club or organization: Not on file     Attends meetings of clubs or organizations: Not on file     Relationship status: Not on file     Intimate partner violence     Fear of current or ex partner: Not on file     Emotionally abused: Not on file     Physically abused: Not on file     Forced sexual activity: Not on file   Other Topics Concern     Parent/sibling w/ CABG, MI or angioplasty before 65F 55M? Not Asked   Social History Narrative     Not on file        No family history on file.       There is no immunization history on file for this patient.    Current Outpatient Medications   Medication Sig     albuterol (PROAIR HFA/PROVENTIL HFA/VENTOLIN HFA) 108 (90 Base) MCG/ACT inhaler Inhale 2 puffs into the lungs every 6 hours as needed for  shortness of breath / dyspnea or wheezing     benzonatate (TESSALON) 200 MG capsule Take 1 capsule (200 mg) by mouth 3 times daily as needed for cough     Prenatal Vit-Fe Fumarate-FA (PRENATAL MULTIVITAMIN W/IRON) 27-0.8 MG tablet Take 1 tablet by mouth daily     No current facility-administered medications for this visit.         Review of Systems:  I have done 10 points of review systems and all negative except for those mentioned in HPI    Physical examination  Constitutional: Oriented, not in distress  Head and neck: normal posture and movements  Respiratory: Normal tidal breathing, no shortness of breath, no audible wheezing or stridor over the phone or video visit  Neurological: Alert, orientedx3, no motor deficits  Psychiatric:  Mood and affect are appropriate with insight into his/her medical condition    Data:  Lab Results   Component Value Date    WBC 4.8 12/01/2020     Lab Results   Component Value Date    RBC 4.24 12/01/2020     Lab Results   Component Value Date    HGB 12.2 12/01/2020     Lab Results   Component Value Date    HCT 37.3 12/01/2020     Lab Results   Component Value Date    MCV 88 12/01/2020     Lab Results   Component Value Date    MCH 28.8 12/01/2020     Lab Results   Component Value Date    MCHC 32.7 12/01/2020     Lab Results   Component Value Date    RDW 14.4 12/01/2020     Lab Results   Component Value Date     12/01/2020       Lab Results   Component Value Date     11/25/2020      Lab Results   Component Value Date    POTASSIUM 3.6 11/25/2020     Lab Results   Component Value Date    CHLORIDE 110 11/25/2020     Lab Results   Component Value Date    ANTON 8.6 11/25/2020     Lab Results   Component Value Date    CO2 21 11/25/2020     Lab Results   Component Value Date    BUN 7 11/25/2020     Lab Results   Component Value Date    CR 0.68 11/25/2020     Lab Results   Component Value Date    GLC 86 11/25/2020         BOB Kramer MD

## 2020-12-02 NOTE — LETTER
"12/2/2020       RE: Juventino Dickerson  [address]  [place] [facility] [address]     Dear Colleague,    Thank you for referring your patient, Juventino Dickerson, to the [facility] at [facility]. Please see a copy of my visit note below.    Juventino Dickerson is a 29 year old female who is being evaluated via a billable video visit.      The patient has been notified of following:     \"This video visit will be conducted via a call between you and your physician/provider. We have found that certain health care needs can be provided without the need for an in-person physical exam.  This service lets us provide the care you need with a video conversation.  If a prescription is necessary we can send it directly to your pharmacy.  If lab work is needed we can place an order for that and you can then stop by our lab to have the test done at a later time.    Video visits are billed at different rates depending on your insurance coverage.  Please reach out to your insurance provider with any questions.    If during the course of the call the physician/provider feels a video visit is not appropriate, you will not be charged for this service.\"    Patient has given verbal consent for Video visit? Yes  How would you like to obtain your AVS? Mail a copy  If you are dropped from the video visit, the video invite should be resent to: Text to cell phone: [phone]  Will anyone else be joining your video visit? No        Video-Visit Details    Type of service:  Video Visit    Video time: 25 minutes    Originating Location (pt. Location): Home    Distant Location (provider location):  [facility]     Platform used for Video Visit: Limk        I have reviewed and updated the patient's allergies and medication list. Patient was asked if they had any patient reported vital signs to present, if yes, please see documented vitals.  Patient was also " asked for their current weight and height, if presented, documented in vitals.    Concerns: New pt.    Refills: No refills    Sarah Saavedra [facility]  Interventional Pulmonary Clinic Virtual Visit Note    2020    Chief complaint:  Juventino Dickerson is a 29 year old female seen for   Chief Complaint   Patient presents with     Video Visit     New; Mediastinal Lymphadenopathy       Reason for clinic visit / Chief complaint:   Mediastinal/hilar lymph node enlargements    Assessment and Plan:  Mediastinal/hilar lymph node enlargements, noncaseating granulomas on biopsies (EBUS) likely suggesting sarcoidosis.  She has no other manifestations by clinical history.  Her QuantiFERON and fungal serologies pending however cultures (biopsy) have been negative for infectious etiology.  I will have her see one of our sarcoidosis specialists in our division.  6 weeks of pregnancy.  I strongly suggested her to find an OB physician for further follow-up especially if she needs treatment for sarcoidosis it needs to be arranged with them.  I indicated to her that two thirds of the time sarcoidosis symptoms may improve during pregnancy however there are potential complications such as increased risk of thromboembolic disease, preeclampsia and  birth.    History of Present Illness:  This is a 29 years old woman who was recently hospitalized because of dyspnea and found to have large mediastinal/hilar lymph node enlargements.  She has no history of vision problems, skin rash, joint problems or any other organ issues by history.  Initially she was told to have lymphoma and work-up included EBUS guided mediastinal lymph node biopsies as well as bone marrow biopsy.  Flow cytometry did not reveal changes suggesting lymphoma.  There are multiple noncaseating granulomas in her mediastinal lymph node biopsies.  Stains and cultures have been negative so far.  She is discharged home and has no dyspnea anymore.  I  interviewed her on video today and her mom was also attended with her phone.  Patient is 6 weeks of pregnant and has no OB physician yet.  No history of sarcoidosis in her family.       No Known Allergies     PMH: none    Past Surgical History:   Procedure Laterality Date     BRONCHOSCOPY RIDID OR FLEXIBLE W/ENDOBRONCHIAL ULTRASOUND GUIDED 3 OR MORE NODE STATIONS N/A 11/24/2020    Procedure: BRONCHOSCOPY, WITH BIOPSY OF 3 OR MORE LYMPH NODE STATIONS WITH ENDOBRONCHIAL ULTRASOUND GUIDANCE;  Surgeon: Kevin Kramer MD;  Location: [facility]        Social History     Socioeconomic History     Marital status: Single     Spouse name: Not on file     Number of children: Not on file     Years of education: Not on file     Highest education level: Not on file   Occupational History     Not on file   Social Needs     Financial resource strain: Not on file     Food insecurity     Worry: Not on file     Inability: Not on file     Transportation needs     Medical: Not on file     Non-medical: Not on file   Tobacco Use     Smoking status: Never Smoker     Smokeless tobacco: Never Used   Substance and Sexual Activity     Alcohol use: Not on file     Drug use: Not on file     Sexual activity: Not on file   Lifestyle     Physical activity     Days per week: Not on file     Minutes per session: Not on file     Stress: Not on file   Relationships     Social connections     Talks on phone: Not on file     Gets together: Not on file     Attends Amish service: Not on file     Active member of club or organization: Not on file     Attends meetings of clubs or organizations: Not on file     Relationship status: Not on file     Intimate partner violence     Fear of current or ex partner: Not on file     Emotionally abused: Not on file     Physically abused: Not on file     Forced sexual activity: Not on file   Other Topics Concern     Parent/sibling w/ CABG, MI or angioplasty before 65F 55M? Not Asked   Social History Narrative      Not on file        No family history on file.       There is no immunization history on file for this patient.    Current Outpatient Medications   Medication Sig     albuterol (PROAIR HFA/PROVENTIL HFA/VENTOLIN HFA) 108 (90 Base) MCG/ACT inhaler Inhale 2 puffs into the lungs every 6 hours as needed for shortness of breath / dyspnea or wheezing     benzonatate (TESSALON) 200 MG capsule Take 1 capsule (200 mg) by mouth 3 times daily as needed for cough     Prenatal Vit-Fe Fumarate-FA (PRENATAL MULTIVITAMIN W/IRON) 27-0.8 MG tablet Take 1 tablet by mouth daily     No current facility-administered medications for this visit.         Review of Systems:  I have done 10 points of review systems and all negative except for those mentioned in HPI    Physical examination  Constitutional: Oriented, not in distress  Head and neck: normal posture and movements  Respiratory: Normal tidal breathing, no shortness of breath, no audible wheezing or stridor over the phone or video visit  Neurological: Alert, orientedx3, no motor deficits  Psychiatric:  Mood and affect are appropriate with insight into his/her medical condition    Data:  Lab Results   Component Value Date    WBC 4.8 12/01/2020     Lab Results   Component Value Date    RBC 4.24 12/01/2020     Lab Results   Component Value Date    HGB 12.2 12/01/2020     Lab Results   Component Value Date    HCT 37.3 12/01/2020     Lab Results   Component Value Date    MCV 88 12/01/2020     Lab Results   Component Value Date    MCH 28.8 12/01/2020     Lab Results   Component Value Date    MCHC 32.7 12/01/2020     Lab Results   Component Value Date    RDW 14.4 12/01/2020     Lab Results   Component Value Date     12/01/2020       Lab Results   Component Value Date     11/25/2020      Lab Results   Component Value Date    POTASSIUM 3.6 11/25/2020     Lab Results   Component Value Date    CHLORIDE 110 11/25/2020     Lab Results   Component Value Date    ANTON 8.6 11/25/2020      Lab Results   Component Value Date    CO2 21 11/25/2020     Lab Results   Component Value Date    BUN 7 11/25/2020     Lab Results   Component Value Date    CR 0.68 11/25/2020     Lab Results   Component Value Date    GLC 86 11/25/2020         BOB Kramer MD

## 2020-12-03 DIAGNOSIS — O09.90 HIGH-RISK PREGNANCY, UNSPECIFIED TRIMESTER: ICD-10-CM

## 2020-12-03 DIAGNOSIS — J98.59 MEDIASTINAL MASS: Primary | ICD-10-CM

## 2020-12-03 LAB — LAB SCANNED RESULT: NORMAL

## 2020-12-05 ENCOUNTER — HOSPITAL ENCOUNTER (EMERGENCY)
Facility: CLINIC | Age: 30
Discharge: HOME | End: 2020-12-05
Attending: EMERGENCY MEDICINE | Admitting: EMERGENCY MEDICINE
Payer: MEDICARE

## 2020-12-05 VITALS
OXYGEN SATURATION: 100 % | HEART RATE: 67 BPM | SYSTOLIC BLOOD PRESSURE: 117 MMHG | BODY MASS INDEX: 31.8 KG/M2 | TEMPERATURE: 97.6 F | RESPIRATION RATE: 18 BRPM | DIASTOLIC BLOOD PRESSURE: 68 MMHG | HEIGHT: 61 IN

## 2020-12-05 DIAGNOSIS — O99.111 OTHER DISEASES OF THE BLOOD AND BLOOD-FORMING ORGANS AND CERTAIN DISORDERS INVOLVING THE IMMUNE MECHANISM COMPLICATING PREGNANCY, FIRST TRIMESTER: ICD-10-CM

## 2020-12-05 DIAGNOSIS — D86.9 SARCOIDOSIS: ICD-10-CM

## 2020-12-05 DIAGNOSIS — Z87.891 PERSONAL HISTORY OF TOBACCO USE, PRESENTING HAZARDS TO HEALTH: ICD-10-CM

## 2020-12-05 DIAGNOSIS — R09.81 NASAL CONGESTION: ICD-10-CM

## 2020-12-05 DIAGNOSIS — Z3A.01 LESS THAN 8 WEEKS GESTATION OF PREGNANCY: ICD-10-CM

## 2020-12-05 LAB — INTERPRETATION ECG - MUSE: NORMAL

## 2020-12-05 PROCEDURE — 99283 EMERGENCY DEPT VISIT LOW MDM: CPT

## 2020-12-05 PROCEDURE — 93010 ELECTROCARDIOGRAM REPORT: CPT | Performed by: EMERGENCY MEDICINE

## 2020-12-05 PROCEDURE — 93005 ELECTROCARDIOGRAM TRACING: CPT

## 2020-12-05 PROCEDURE — 99284 EMERGENCY DEPT VISIT MOD MDM: CPT | Mod: 25 | Performed by: EMERGENCY MEDICINE

## 2020-12-05 RX ORDER — FLUTICASONE PROPIONATE 50 MCG
1 SPRAY, SUSPENSION (ML) NASAL DAILY
Qty: 11.1 ML | Refills: 0 | Status: SHIPPED | OUTPATIENT
Start: 2020-12-05

## 2020-12-05 ASSESSMENT — ENCOUNTER SYMPTOMS
CONFUSION: 0
ARTHRALGIAS: 0
FEVER: 0
COLOR CHANGE: 0
EYE REDNESS: 0
DIFFICULTY URINATING: 0
ABDOMINAL PAIN: 0
NECK STIFFNESS: 0
COUGH: 1
SHORTNESS OF BREATH: 1
HEADACHES: 0

## 2020-12-05 NOTE — ED AVS SNAPSHOT
[facility] Emergency Department  [address] [address]  Phone: [phone]                                    Juventino Dickerson   MRN: [id number]    Department: [facility] Emergency Department   Date of Visit: 12/5/2020           After Visit Summary Signature Page    I have received my discharge instructions, and my questions have been answered. I have discussed any challenges I see with this plan with the nurse or doctor.    ..........................................................................................................................................  Patient/Patient Representative Signature      ..........................................................................................................................................  Patient Representative Print Name and Relationship to Patient    ..................................................               ................................................  Date                                   Time    ..........................................................................................................................................  Reviewed by Signature/Title    ...................................................              ..............................................  Date                                               Time          22EPIC Rev 08/18

## 2020-12-05 NOTE — ED PROVIDER NOTES
[place] EMERGENCY DEPARTMENT ([facility])  December 5, 2020  History     Chief Complaint   Patient presents with     Shortness of Breath     HPI  Juventino Dickerson is a (6 weeks pregnant)  29 year old female with a PMH of ILD, sarcoidosis, mediastinal mass and mediastinal lymphadenopathy who presents to the ED today complaining of shortness of breath.  Patient reports she has a recent diagnosis of sarcoidosis 2 weeks ago.  Patient is also complaining of sinus congestion and cough.  She reports she has had a cough on and off since November 1, where she underwent a work-up and sarcoidosis was found.  She reports she woke up this morning gasping for air, and could not breathe through her nose.  Patient states her current nasal congestion episode started yesterday, but she has had several episodes on and off since November 1 of congestion.  And she used her inhaler which did not help.  She reports her congestion has been worsening.  She thinks her congestion is from staying inside so much, and notes that her daughter has a guinea pig.  She states that she was a 3 pack a day smoker up until 2 weeks ago when she quit.    Patient has been complaining of recurrent cough since 11/1/2020.  She has reported shortness of breath while having coughing fits, but not at rest.  Patient smokes 3 packs of cigarettes a day.  As of 11/23/2020, patient had a negative COVID-19 test.    EXAM: CT CHEST PULMONARY EMBOLISM W CONTRAST [facility] 11/20/2020 4:13 AM  INDICATION: Shortness of breath.  COMPARISON: None.                                                  IMPRESSION:  1.  There is no pulmonary embolus, aortic aneurysm or dissection.  2.  Multiple enlarged mediastinal and bilateral hilar lymph nodes. Lymphoma is a possibility.    EXAMINATION: US ABDOMEN COMPLETE,  11/24/2020 8:58 AM   COMPARISON: None.  HISTORY: 29-year-old female 5 weeks pregnant with new adenopathy,  workup for lymphoma.  IMPRESSION: Normal  abdominal ultrasound.     I have reviewed the Medications, Allergies, Past Medical and Surgical History, and Social History in the [employer] system.  PAST MEDICAL HISTORY: History reviewed. No pertinent past medical history.    PAST SURGICAL HISTORY:   Past Surgical History:   Procedure Laterality Date     BRONCHOSCOPY RIDID OR FLEXIBLE W/ENDOBRONCHIAL ULTRASOUND GUIDED 3 OR MORE NODE STATIONS N/A 11/24/2020    Procedure: BRONCHOSCOPY, WITH BIOPSY OF 3 OR MORE LYMPH NODE STATIONS WITH ENDOBRONCHIAL ULTRASOUND GUIDANCE;  Surgeon: Kevin Kramer MD;  Location: [facility]       Past medical history, past surgical history, medications, and allergies were reviewed with the patient. Additional pertinent items: None    FAMILY HISTORY: No family history on file.    SOCIAL HISTORY:   Social History     Tobacco Use     Smoking status: Never Smoker     Smokeless tobacco: Never Used   Substance Use Topics     Alcohol use: Not Currently     Social history was reviewed with the patient. Additional pertinent items: None      Patient's Medications   New Prescriptions    FLUTICASONE (FLONASE) 50 MCG/ACT NASAL SPRAY    Spray 1 spray into both nostrils daily   Previous Medications    ALBUTEROL (PROAIR HFA/PROVENTIL HFA/VENTOLIN HFA) 108 (90 BASE) MCG/ACT INHALER    Inhale 2 puffs into the lungs every 6 hours as needed for shortness of breath / dyspnea or wheezing    BENZONATATE (TESSALON) 200 MG CAPSULE    Take 1 capsule (200 mg) by mouth 3 times daily as needed for cough    PRENATAL VIT-FE FUMARATE-FA (PRENATAL MULTIVITAMIN W/IRON) 27-0.8 MG TABLET    Take 1 tablet by mouth daily   Modified Medications    No medications on file   Discontinued Medications    No medications on file        No Known Allergies     Review of Systems   Constitutional: Negative for fever.   HENT: Positive for congestion.    Eyes: Negative for redness.   Respiratory: Positive for cough and shortness of breath.    Cardiovascular: Negative for chest pain.  "  Gastrointestinal: Negative for abdominal pain.   Genitourinary: Negative for difficulty urinating.   Musculoskeletal: Negative for arthralgias and neck stiffness.   Skin: Negative for color change.   Neurological: Negative for headaches.   Psychiatric/Behavioral: Negative for confusion.   All other systems reviewed and are negative.    A complete review of systems was performed with pertinent positives and negatives noted in the HPI, and all other systems negative.    Physical Exam   BP: 118/70  Pulse: 77  Temp: 97.6  F (36.4  C)  Resp: 18  Height: 154.9 cm (5' 1\")  SpO2: 100 %      Physical Exam  Constitutional:       General: She is not in acute distress.     Appearance: She is not diaphoretic.   HENT:      Head: Atraumatic.      Nose: Mucosal edema and congestion present.      Mouth/Throat:      Pharynx: No oropharyngeal exudate.   Eyes:      General: No scleral icterus.     Pupils: Pupils are equal, round, and reactive to light.   Cardiovascular:      Heart sounds: Normal heart sounds.   Pulmonary:      Effort: No respiratory distress.      Breath sounds: Normal breath sounds.   Abdominal:      General: Bowel sounds are normal.      Palpations: Abdomen is soft.      Tenderness: There is no abdominal tenderness.   Musculoskeletal:         General: No tenderness.   Skin:     General: Skin is warm.      Findings: No rash.         ED Course        Procedures                EKG Interpretation:      Interpreted by Jimmy Azul MD  Time reviewed: 10:20 AM  Symptoms at time of EKG: Nasal congestion  Rhythm: normal sinus   Rate: normal  Axis: NORMAL  Ectopy: none  Conduction: normal  ST Segments/ T Waves: No ST-T wave changes  Q Waves: none  Comparison to prior: Unchanged    Clinical Impression: normal EKG                  Results for orders placed or performed during the hospital encounter of 12/05/20 (from the past 24 hour(s))   EKG 12 lead   Result Value Ref Range    Interpretation ECG Click View Image link to " view waveform and result      Medications - No data to display          Assessments & Plan (with Medical Decision Making)   29-year-old female with recent diagnosis of sarcoidosis who presents for evaluation of shortness of breath nasal congestion.  Differential includes allergic phenomena such as allergic rhinitis, asthma, sinusitis, URI, pneumonia, sarcoidosis, pneumothorax, pulmonary embolus, CHF.  Exam revealed nasal congestion with boggy mucosa but was otherwise entirely normal including oxygen saturation of 100%.  Patient was recently diagnosed with sarcoidosis and has several appointments coming up including echocardiogram, OB given approximately 6 weeks pregnant, pulmonary, infectious disease and rheumatology.  It appears that her presenting complaint today-nasal congestion-has been intermittent over the past 5 weeks.  She has had recent extensive work-up for her cough and shortness of breath including laboratory tests, sputum smears and cultures, CT scan and bronchoscopy with a resultant diagnosis of sarcoidosis.  I think it is unlikely that she has new process contributing to her symptoms.  I have recommended trial of nasal steroid such as Flonase and follow-up with her scheduled providers.  I have reviewed the nursing notes.    I have reviewed the findings, diagnosis, plan and need for follow up with the patient.    New Prescriptions    FLUTICASONE (FLONASE) 50 MCG/ACT NASAL SPRAY    Spray 1 spray into both nostrils daily       Final diagnoses:   Nasal congestion   Sarcoidosis   ITyrell, am serving as a trained medical scribe to document services personally performed by Jimmy Azul MD, based on the provider's statements to me.     Jimmy SCHWAB MD, was physically present and have reviewed and verified the accuracy of this note documented by Tyrell Anaya.      12/5/2020   [facility] EMERGENCY DEPARTMENT     Jimmy Azul MD  12/05/20 3968

## 2020-12-05 NOTE — ED TRIAGE NOTES
"Pt c/o SOB that increased last night. States that she has a hard time breathing \"through my mouth and now my nose is clogged up too.\"   "

## 2020-12-10 ENCOUNTER — ANCILLARY PROCEDURE (OUTPATIENT)
Dept: CARDIOLOGY | Facility: CLINIC | Age: 30
End: 2020-12-10
Attending: INTERNAL MEDICINE
Payer: MEDICARE

## 2020-12-10 DIAGNOSIS — J84.9 ILD (INTERSTITIAL LUNG DISEASE) (H): ICD-10-CM

## 2020-12-10 LAB
6 MIN WALK (FT): 1375 FT
6 MIN WALK (M): 419 M

## 2020-12-10 PROCEDURE — 94729 DIFFUSING CAPACITY: CPT | Performed by: INTERNAL MEDICINE

## 2020-12-10 PROCEDURE — 93306 TTE W/DOPPLER COMPLETE: CPT | Performed by: INTERNAL MEDICINE

## 2020-12-10 PROCEDURE — 94726 PLETHYSMOGRAPHY LUNG VOLUMES: CPT | Performed by: INTERNAL MEDICINE

## 2020-12-10 PROCEDURE — 94375 RESPIRATORY FLOW VOLUME LOOP: CPT | Performed by: INTERNAL MEDICINE

## 2020-12-10 PROCEDURE — 94618 PULMONARY STRESS TESTING: CPT | Performed by: INTERNAL MEDICINE

## 2020-12-14 ENCOUNTER — VIRTUAL VISIT (OUTPATIENT)
Dept: INFECTIOUS DISEASES | Facility: CLINIC | Age: 30
End: 2020-12-14
Attending: STUDENT IN AN ORGANIZED HEALTH CARE EDUCATION/TRAINING PROGRAM
Payer: MEDICARE

## 2020-12-14 ENCOUNTER — HEALTH MAINTENANCE LETTER (OUTPATIENT)
Age: 30
End: 2020-12-14

## 2020-12-14 ENCOUNTER — PRE VISIT (OUTPATIENT)
Dept: INFECTIOUS DISEASES | Facility: CLINIC | Age: 30
End: 2020-12-14

## 2020-12-14 DIAGNOSIS — Z3A.01 LESS THAN 8 WEEKS GESTATION OF PREGNANCY: ICD-10-CM

## 2020-12-14 DIAGNOSIS — R59.9 GRANULOMATOUS ADENOPATHY: Primary | ICD-10-CM

## 2020-12-14 DIAGNOSIS — J98.59 MEDIASTINAL MASS: ICD-10-CM

## 2020-12-14 DIAGNOSIS — R59.0 LYMPHADENOPATHY, MEDIASTINAL: ICD-10-CM

## 2020-12-14 LAB
DLCOCOR-%PRED-PRE: 118 %
DLCOCOR-PRE: 24.03 ML/MIN/MMHG
DLCOUNC-%PRED-PRE: 114 %
DLCOUNC-PRE: 23.09 ML/MIN/MMHG
DLCOUNC-PRED: 20.2 ML/MIN/MMHG
ERV-%PRED-PRE: 70 %
ERV-PRE: 0.61 L
ERV-PRED: 0.86 L
EXPTIME-PRE: 4.94 SEC
FEF2575-%PRED-PRE: 86 %
FEF2575-PRE: 2.79 L/SEC
FEF2575-PRED: 3.21 L/SEC
FEFMAX-%PRED-PRE: 84 %
FEFMAX-PRE: 5.49 L/SEC
FEFMAX-PRED: 6.54 L/SEC
FEV1-%PRED-PRE: 110 %
FEV1-PRE: 3 L
FEV1FEV6-PRE: 81 %
FEV1FEV6-PRED: 86 %
FEV1FVC-PRE: 81 %
FEV1FVC-PRED: 87 %
FEV1SVC-PRE: 78 %
FEV1SVC-PRED: 77 %
FIFMAX-PRE: 5.51 L/SEC
FRCPLETH-%PRED-PRE: 70 %
FRCPLETH-PRE: 1.77 L
FRCPLETH-PRED: 2.5 L
FVC-%PRED-PRE: 118 %
FVC-PRE: 3.73 L
FVC-PRED: 3.14 L
IC-%PRED-PRE: 121 %
IC-PRE: 3.26 L
IC-PRED: 2.68 L
RVPLETH-%PRED-PRE: 91 %
RVPLETH-PRE: 1.16 L
RVPLETH-PRED: 1.27 L
TLCPLETH-%PRED-PRE: 113 %
TLCPLETH-PRE: 5.03 L
TLCPLETH-PRED: 4.44 L
VA-%PRED-PRE: 111 %
VA-PRE: 4.76 L
VC-%PRED-PRE: 109 %
VC-PRE: 3.87 L
VC-PRED: 3.54 L

## 2020-12-14 PROCEDURE — 99203 OFFICE O/P NEW LOW 30 MIN: CPT | Mod: 95 | Performed by: STUDENT IN AN ORGANIZED HEALTH CARE EDUCATION/TRAINING PROGRAM

## 2020-12-14 ASSESSMENT — PAIN SCALES - GENERAL: PAINLEVEL: NO PAIN (0)

## 2020-12-14 NOTE — LETTER
Date:December 16, 2020      Patient was self referred, no letter generated. Do not send.        [facility] Health Information

## 2020-12-14 NOTE — LETTER
"12/14/2020       RE: Juventino Dickerson  [address]  [facility] [address]     Dear Colleague,    Thank you for referring your patient, Juventino Dickerson, to the [facility] INFECTIOUS DISEASE CLINIC [place] at [facility]. Please see a copy of my visit note below.    Juventino Dickerson is a 30 year old female who is being evaluated via a billable video visit.      The patient has been notified of following:     \"This video visit will be conducted via a call between you and your physician/provider. We have found that certain health care needs can be provided without the need for an in-person physical exam.  This service lets us provide the care you need with a video conversation.  If a prescription is necessary we can send it directly to your pharmacy.  If lab work is needed we can place an order for that and you can then stop by our lab to have the test done at a later time.    Video visits are billed at different rates depending on your insurance coverage.  Please reach out to your insurance provider with any questions.    If during the course of the call the physician/provider feels a video visit is not appropriate, you will not be charged for this service.\"    Patient has given verbal consent for Video visit? Yes  How would you like to obtain your AVS? MyChart  If you are dropped from the video visit, the video invite should be resent to: Text to cell phone: [phone]  Will anyone else be joining your video visit? No        Video-Visit Details    Type of service:  Video Visit    Video Start Time: 2.56 pm  Video End Time: 3:32 PM    Originating Location (pt. Location): Home    Distant Location (provider location):  [facility] INFECTIOUS DISEASE [facility]     Platform used for Video Visit: Clive Morales MD           [facility]  Infectious Disease Consultation note  Today's Date: 12/14/2020    Assessment and " Recommendations:  Juventino Dickerson is a 30 year old with no PMH. She is currently 7 weeks pregnant. This is her third pregnancy.     SOB, cough, night sweats : since ~11/1/20. CT chest with multiple bilateral mediastinal and hilar lymphadenopathy. EBUS with FNA showed non necrotizing granulomas with negative GMS/ Brayden stain. Unfortunately no cxs were sent. Serum and urine histoplasma antigen and blood quantiferon gold plus are negative.     This is most likely sarcoid and less likely histo/blasto based on non necrotising granulomas, negative GMS stain, neg urine and serum histo antigen and exposure history. Will obtain fungal antibodies by CF and ID (tomorrow when she will be in for OB USG at [place]) to make sure they are negative. If they are negative, most likely sarcoid and can proceed with steroids from an ID perspective.    Thank you for involving Infectious Disease in the care of this patient.     Yaritza Morales  , [facility]  Pager - [phone]    Attestation: I have reviewed medications, labs and imaging. I personally reviewed the imaging. Reviewed medical history, surgical history, and family history in Epic History tab. No updates needed to be made.     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 She is currently 7 weeks pregnant. This is her third pregnancy.     Around 11/1 developed night sweats, SOB and cough with post tussive emesis. She had a CT chest 11/20 that the showed multiple mediastinal and hilar lymphadenopathy encasing bilatreal main bronch and SVC but they are patent. She was admitted 11/23-11/25 when she underwent bronch with EBUS of mediastinal/hilar lymph nodes and bone marrow biopsy. The lymph node  FNA was read as nonnecrotizing or minimal focal necrosis granulomas with negative GMS and Brayden stain. Unfortunately no cxs were sent. The bone marrow biopsy was normal.     She subsequently saw Dr. Riggins with rheumatology who thinks she has sarcoidosis. However wanted to make sure no other ID issues before starting steroids. Her serum and urine histoplasma antigen are negative and qunatiferon gold plus is also negative.       Patient currently reports Nausea, vomiting, shortness of breath, Night sweats and bilateral nasal blockages.    Cough is a lot better now. No fevers at any time.     She was born and raised in [place], has been living for 10 years in [place]  Lives with her 2 kids. She does not work. She has been getting social security.   Got Guinea pig pet 8 months ago  She does not do gardening or outdoor activities.  No international travel.   No known exposure to TB     Social Hx:  Social History     Tobacco Use     Smoking status: Never Smoker     Smokeless tobacco: Never Used   Substance Use Topics     Alcohol use: Not Currently     Drug use: Not Currently         Immunizations:    There is no immunization history on file for this patient.      Allergies:   No Known Allergies      Medications:  Current Outpatient Medications   Medication Sig Dispense Refill     albuterol (PROAIR HFA/PROVENTIL HFA/VENTOLIN HFA) 108 (90 Base) MCG/ACT inhaler Inhale 2 puffs into the lungs every 6 hours as needed for shortness of breath / dyspnea or wheezing 1 Inhaler 0     benzonatate (TESSALON) 200 MG capsule Take 1 capsule (200 mg) by mouth 3 times daily as needed for cough 21 capsule 0     fluticasone (FLONASE) 50 MCG/ACT nasal spray Spray 1 spray into both nostrils daily 11.1 mL 0     Prenatal Vit-Fe Fumarate-FA (PRENATAL MULTIVITAMIN W/IRON) 27-0.8 MG tablet Take 1 tablet by mouth daily 30 tablet 0         Past Medical Hx:  none      Family History:  No family history of TB or sarcoid or endemic fungal  diseases    Review of Systems:  10 systems reviewed, pertinent positives noted in my HPI      Examination:  Unable to examine due to virtual visit   Looks ok on video      Laboratory:  Hematology:  Recent Labs   Lab Test 12/01/20  1017 11/25/20 0528 11/24/20 0543 11/23/20 1354 11/20/20 0156   WBC 4.8 4.6 5.2 5.2 5.5   ANEU  --  2.5 2.5 3.1 2.6   ALYM  --  1.3 1.7 1.4 2.0   SAGAR  --  0.5 0.6 0.5 0.5   AEOS  --  0.3 0.4 0.2 0.4   HGB 12.2 11.5* 11.9 12.6 11.6*   HCT 37.3 35.9 36.8 39.3 36.8    382 418 447 404       Chemistry:  Recent Labs   Lab Test 11/25/20 0528 11/24/20 0543 11/23/20 1354 11/23/20 1354 11/20/20 0156 11/20/20 0156    137  --  135  --  136   POTASSIUM 3.6 3.5  --  3.7  --  3.6   CHLORIDE 110* 110*  --  107  --  105   CO2 21 20  --  23  --  24   ANIONGAP 6 6  --  5  --  8   BUN 7 9  --  9  --  10   CR 0.68 0.69  --  0.73  --  0.72   GFRESTIMATED >90 >90  --  >90  --  >90   GLC 86 82  --  73  --  85   ANTON 8.6 9.0  --  9.0  --  8.8   PHOS 3.1 3.0   < > 2.2*  --   --    MAG 2.0 1.8  --  2.0   < >  --     < > = values in this interval not displayed.       Liver Function Studies:  Recent Labs   Lab Test 11/25/20 0528 11/24/20 0543 11/23/20 1354 11/20/20 0156 11/20/20 0156   BILITOTAL  --   --  0.5  --  0.3   ALKPHOS  --   --  75  --  65   ALBUMIN  --   --  4.0  --  3.7   AST  --   --  16  --  12   ALT  --   --  28  --  24    187 188   < >  --     < > = values in this interval not displayed.     CRP 14  ESR 41  ACE normal  PFTs ok     Microbiology:  Results for JOHN ÁLVAREZ (MRN [id number]) as of 12/14/2020 18:08   Ref. Range 12/1/2020 10:17   MTB Quantiferon Result Latest Ref Range: NEG^Negative  Negative   TB1 Ag minus Nil Latest Units: IU/mL 0.01   TB2 Ag minus Nil Latest Units: IU/mL 0.01   Mitogen minus Nil Latest Units: IU/mL 8.61   Nil Result Latest Units: IU/mL 0.11     Results for JOHN ÁLVAREZ (MRN [id number]) as of 12/14/2020 18:08   Ref. Range 11/23/2020  13:54   Hep B Surface Agn Latest Ref Range: NR^Nonreactive  Nonreactive   Hepatitis B Surface Antibody Latest Ref Range: <8.00 m[IU]/mL 113.99 (H)   Hepatitis B Core Marci Latest Ref Range: NR^Nonreactive  Nonreactive   HIV Antigen Antibody Combo Latest Ref Range: NR^Nonreactive     Nonreactive     Pathology:    FNA mediastinal/hilar lymph nodes: 11/24/20  A.  Lymph node, 11L,endobronchial ultrasound guided fine needle   aspiration:   - Granulomatous inflammation with scant focal necrosis   - Negative for malignancy   - Special stains (GMS and Brayden/AFB) negative for fungal or acid fast   organisms, respectively   Specimen Adequacy: Satisfactory for evaluation.     B.  Lymph node, station 7 ,endobronchial ultrasound guided fine needle   aspiration:   - Non-necrotizing granulomatous inflammation   - Negative for malignancy   - Special stains (GMS and Brayden/AFB) negative for fungal or acid fast   organisms, respectively   Specimen Adequacy: Satisfactory for evaluation.     C.  Lymph node, 4R ,endobronchial ultrasound guided fine needle   aspiration:   - Non-necrotizing granulomatous inflammation   - Negative for malignancy   - Special stains (GMS and Brayden/AFB) negative for fungal or acid fast   organisms, respectively   Specimen Adequacy: Satisfactory for evaluation.     COMMENT:   The smears and cell block sections demonstrate mostly non-necrotizing   epithelioid granulomas. Special stains   (GMS and Brayden/AFB) are negative for fungal and acid fast organisms.    Bone marrow biopsy 11/24/20  Bone marrow, posterior iliac crest, left, decalcified trephine biopsy,   touch imprint, left aspirate clot,   direct aspirate smear, concentrate aspirate smear, and peripheral blood   smear:     - Normocellular marrow for age (70%) with trilineage hematopoiesis; no   morphologic or immunophenotypic   evidence for leukemia/lymphoma     - Peripheral blood showing increased platelet clumping, but otherwise   unremarkable morphology with  normal   hemogram and differential     Imaging:  Echo 12/10/20  Left ventricular function, chamber size, wall motion, and wall thickness are  normal.The EF is 60-65%.  Right ventricular function, chamber size, wall motion, and thickness are  normal.  No pericardial effusion is present.  IVC diameter <2.1 cm collapsing >50% with sniff suggests a normal RA pressure  of 3 mmHg.    Ct chest 11/20/20  There are multiple enlarged mediastinal and bilateral hilar lymph nodes. A precarinal lymph node mass measures approximately 3.3 x 3.8 cm. The superior vena cava is compressed anteriorly, but remains patent. Bilateral main bronchi are encased by lymph node disease. There is no axillary lymph node enlargement. The heart size is normal.                      Again, thank you for allowing me to participate in the care of your patient.      Sincerely,    Yaritza Morales MD

## 2020-12-14 NOTE — PROGRESS NOTES
"Juventino Dickerson is a 30 year old female who is being evaluated via a billable video visit.      The patient has been notified of following:     \"This video visit will be conducted via a call between you and your physician/provider. We have found that certain health care needs can be provided without the need for an in-person physical exam.  This service lets us provide the care you need with a video conversation.  If a prescription is necessary we can send it directly to your pharmacy.  If lab work is needed we can place an order for that and you can then stop by our lab to have the test done at a later time.    Video visits are billed at different rates depending on your insurance coverage.  Please reach out to your insurance provider with any questions.    If during the course of the call the physician/provider feels a video visit is not appropriate, you will not be charged for this service.\"    Patient has given verbal consent for Video visit? Yes  How would you like to obtain your AVS? MyChart  If you are dropped from the video visit, the video invite should be resent to: Text to cell phone: [phone]  Will anyone else be joining your video visit? No        Video-Visit Details    Type of service:  Video Visit    Video Start Time: 2.56 pm  Video End Time: 3:32 PM    Originating Location (pt. Location): Home    Distant Location (provider location):  [facility] [place]     Platform used for Video Visit: Clive Morales MD           [facility]  Infectious Disease Consultation note  Today's Date: 12/14/2020    Assessment and Recommendations:  Juventino Dickerson is a 30 year old with no PMH. She is currently 7 weeks pregnant. This is her third pregnancy.     SOB, cough, night sweats : since ~11/1/20. CT chest with multiple bilateral mediastinal and hilar lymphadenopathy. EBUS with FNA showed non necrotizing granulomas with negative GMS/ Brayden stain. " Unfortunately no cxs were sent. Serum and urine histoplasma antigen and blood quantiferon gold plus are negative.     This is most likely sarcoid and less likely histo/blasto based on non necrotising granulomas, negative GMS stain, neg urine and serum histo antigen and exposure history. Will obtain fungal antibodies by CF and ID (tomorrow when she will be in for OB USG at [place]) to make sure they are negative. If they are negative, most likely sarcoid and can proceed with steroids from an ID perspective.    Thank you for involving Infectious Disease in the care of this patient.     Yaritza Morales  , [facility]  Pager - [phone]    Attestation: I have reviewed medications, labs and imaging. I personally reviewed the imaging. Reviewed medical history, surgical history, and family history in Epic History tab. No updates needed to be made.     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 She is currently 7 weeks pregnant. This is her third pregnancy.     Around 11/1 developed night sweats, SOB and cough with post tussive emesis. She had a CT chest 11/20 that the showed multiple mediastinal and hilar lymphadenopathy encasing bilatreal main bronch and SVC but they are patent. She was admitted 11/23-11/25 when she underwent bronch with EBUS of mediastinal/hilar lymph nodes and bone marrow biopsy. The lymph node FNA was read as nonnecrotizing or minimal focal necrosis granulomas with negative GMS and Brayden stain. Unfortunately no cxs were sent. The bone marrow biopsy was normal.     She subsequently saw Dr. Riggins with rheumatology who thinks she has sarcoidosis. However wanted to make sure no other ID issues before starting  steroids. Her serum and urine histoplasma antigen are negative and qunatiferon gold plus is also negative.       Patient currently reports Nausea, vomiting, shortness of breath, Night sweats and bilateral nasal blockages.    Cough is a lot better now. No fevers at any time.     She was born and raised in [place], has been living for 10 years in [place]  Lives with her 2 kids. She does not work. She has been getting social security.   Got Guinea pig pet 8 months ago  She does not do gardening or outdoor activities.  No international travel.   No known exposure to TB     Social Hx:  Social History     Tobacco Use     Smoking status: Never Smoker     Smokeless tobacco: Never Used   Substance Use Topics     Alcohol use: Not Currently     Drug use: Not Currently         Immunizations:    There is no immunization history on file for this patient.      Allergies:   No Known Allergies      Medications:  Current Outpatient Medications   Medication Sig Dispense Refill     albuterol (PROAIR HFA/PROVENTIL HFA/VENTOLIN HFA) 108 (90 Base) MCG/ACT inhaler Inhale 2 puffs into the lungs every 6 hours as needed for shortness of breath / dyspnea or wheezing 1 Inhaler 0     benzonatate (TESSALON) 200 MG capsule Take 1 capsule (200 mg) by mouth 3 times daily as needed for cough 21 capsule 0     fluticasone (FLONASE) 50 MCG/ACT nasal spray Spray 1 spray into both nostrils daily 11.1 mL 0     Prenatal Vit-Fe Fumarate-FA (PRENATAL MULTIVITAMIN W/IRON) 27-0.8 MG tablet Take 1 tablet by mouth daily 30 tablet 0         Past Medical Hx:  none      Family History:  No family history of TB or sarcoid or endemic fungal diseases    Review of Systems:  10 systems reviewed, pertinent positives noted in my HPI      Examination:  Unable to examine due to virtual visit   Looks ok on video      Laboratory:  Hematology:  Recent Labs   Lab Test 12/01/20  1017 11/25/20  0528 11/24/20  0543 11/23/20  1354 11/20/20  0156   WBC 4.8 4.6 5.2 5.2 5.5   ANEU  --  2.5  2.5 3.1 2.6   ALYM  --  1.3 1.7 1.4 2.0   SAGAR  --  0.5 0.6 0.5 0.5   AEOS  --  0.3 0.4 0.2 0.4   HGB 12.2 11.5* 11.9 12.6 11.6*   HCT 37.3 35.9 36.8 39.3 36.8    382 418 447 404       Chemistry:  Recent Labs   Lab Test 11/25/20 0528 11/24/20 0543 11/23/20 1354 11/23/20  1354 11/20/20 0156 11/20/20 0156    137  --  135  --  136   POTASSIUM 3.6 3.5  --  3.7  --  3.6   CHLORIDE 110* 110*  --  107  --  105   CO2 21 20  --  23  --  24   ANIONGAP 6 6  --  5  --  8   BUN 7 9  --  9  --  10   CR 0.68 0.69  --  0.73  --  0.72   GFRESTIMATED >90 >90  --  >90  --  >90   GLC 86 82  --  73  --  85   ANTON 8.6 9.0  --  9.0  --  8.8   PHOS 3.1 3.0   < > 2.2*  --   --    MAG 2.0 1.8  --  2.0   < >  --     < > = values in this interval not displayed.       Liver Function Studies:  Recent Labs   Lab Test 11/25/20 0528 11/24/20 0543 11/23/20  1354 11/20/20 0156 11/20/20 0156   BILITOTAL  --   --  0.5  --  0.3   ALKPHOS  --   --  75  --  65   ALBUMIN  --   --  4.0  --  3.7   AST  --   --  16  --  12   ALT  --   --  28  --  24    187 188   < >  --     < > = values in this interval not displayed.     CRP 14  ESR 41  ACE normal  PFTs ok     Microbiology:  Results for JOHN ÁLVAREZ (MRN [id number]) as of 12/14/2020 18:08   Ref. Range 12/1/2020 10:17   MTB Quantiferon Result Latest Ref Range: NEG^Negative  Negative   TB1 Ag minus Nil Latest Units: IU/mL 0.01   TB2 Ag minus Nil Latest Units: IU/mL 0.01   Mitogen minus Nil Latest Units: IU/mL 8.61   Nil Result Latest Units: IU/mL 0.11     Results for JOHN ÁLVAREZ (MRN [id number]) as of 12/14/2020 18:08   Ref. Range 11/23/2020 13:54   Hep B Surface Agn Latest Ref Range: NR^Nonreactive  Nonreactive   Hepatitis B Surface Antibody Latest Ref Range: <8.00 m[IU]/mL 113.99 (H)   Hepatitis B Core Marci Latest Ref Range: NR^Nonreactive  Nonreactive   HIV Antigen Antibody Combo Latest Ref Range: NR^Nonreactive     Nonreactive     Pathology:    FNA mediastinal/hilar  lymph nodes: 11/24/20  A.  Lymph node, 11L,endobronchial ultrasound guided fine needle   aspiration:   - Granulomatous inflammation with scant focal necrosis   - Negative for malignancy   - Special stains (GMS and Brayden/AFB) negative for fungal or acid fast   organisms, respectively   Specimen Adequacy: Satisfactory for evaluation.     B.  Lymph node, station 7 ,endobronchial ultrasound guided fine needle   aspiration:   - Non-necrotizing granulomatous inflammation   - Negative for malignancy   - Special stains (GMS and Brayden/AFB) negative for fungal or acid fast   organisms, respectively   Specimen Adequacy: Satisfactory for evaluation.     C.  Lymph node, 4R ,endobronchial ultrasound guided fine needle   aspiration:   - Non-necrotizing granulomatous inflammation   - Negative for malignancy   - Special stains (GMS and Brayden/AFB) negative for fungal or acid fast   organisms, respectively   Specimen Adequacy: Satisfactory for evaluation.     COMMENT:   The smears and cell block sections demonstrate mostly non-necrotizing   epithelioid granulomas. Special stains   (GMS and Brayden/AFB) are negative for fungal and acid fast organisms.    Bone marrow biopsy 11/24/20  Bone marrow, posterior iliac crest, left, decalcified trephine biopsy,   touch imprint, left aspirate clot,   direct aspirate smear, concentrate aspirate smear, and peripheral blood   smear:     - Normocellular marrow for age (70%) with trilineage hematopoiesis; no   morphologic or immunophenotypic   evidence for leukemia/lymphoma     - Peripheral blood showing increased platelet clumping, but otherwise   unremarkable morphology with normal   hemogram and differential     Imaging:  Echo 12/10/20  Left ventricular function, chamber size, wall motion, and wall thickness are  normal.The EF is 60-65%.  Right ventricular function, chamber size, wall motion, and thickness are  normal.  No pericardial effusion is present.  IVC diameter <2.1 cm collapsing >50% with sniff  suggests a normal RA pressure  of 3 mmHg.    Ct chest 11/20/20  There are multiple enlarged mediastinal and bilateral hilar lymph nodes. A precarinal lymph node mass measures approximately 3.3 x 3.8 cm. The superior vena cava is compressed anteriorly, but remains patent. Bilateral main bronchi are encased by lymph node disease. There is no axillary lymph node enlargement. The heart size is normal.

## 2020-12-15 LAB — COPATH REPORT: NORMAL

## 2020-12-16 ENCOUNTER — PRE VISIT (OUTPATIENT)
Dept: MATERNAL FETAL MEDICINE | Facility: CLINIC | Age: 30
End: 2020-12-16

## 2020-12-16 DIAGNOSIS — R59.0 LYMPHADENOPATHY, MEDIASTINAL: ICD-10-CM

## 2020-12-16 DIAGNOSIS — R59.9 GRANULOMATOUS ADENOPATHY: ICD-10-CM

## 2020-12-16 DIAGNOSIS — D86.9 SARCOIDOSIS: ICD-10-CM

## 2020-12-16 PROCEDURE — 36415 COLL VENOUS BLD VENIPUNCTURE: CPT | Performed by: PATHOLOGY

## 2020-12-17 LAB — COPATH REPORT: NORMAL

## 2020-12-17 NOTE — PROGRESS NOTES
Rheumatology Clinic Visit-in person   Juventino Dickerson MRN# [id number]   YOB: 1990 Age: 30 year old     Date of Visit: 12/18/2020  Primary care provider: No Ref-Primary, Physician          Assessment and Plan:     # Chronic cough, orthopnea, nighttime sweats in a young woman:   Laboratory evaluation on December 1, 2020 showed minimal elevation of CRP at 8.3 vitamin D levels were 11; low.  CBC was normal, and urinalysis showed a trace of blood without red cells.  Sedimentation rate was mildly elevated at 41.  QuantiFERON testing was negative.  PFTs performed on December 10 showed normal spirometry, lung volumes, and diffusion. Echocardiogram 12- was normal.     Fine-needle aspirate of mediastinal lymph node showed granulomatous inflammation with minimal necrosis; no evidence for malignancy. Special stains negative for fungi or acid-fast organisms.    Sarcoidosis: Apart from orthopnea, neither the current symptom complex nor the work-up suggest presence of a significant extrapulmonary inflammatory process. I appreciate input from Infectious disease and Pulmonology consultants in .     Difficulty breathing at night, and cough with shortness of breath during activities of daily living indicate inflammatory changes in the lungs.  Fortunately, pulmonary function tests and echocardiogram argue against significant inflammation in the heart, and the airways are open.  I recommend a 2-week course of prednisone to get breathing and congestion under better control.    2. Pregnancy ~ 9 weeks: Low dose prednisone during pregnancy may be used with reasonable safety.  However, prednisone should be used at the lowest effective dose for the shortest possible time.    Plan:  1.  Start prednisone 10 mg daily.  Take prednisone until January 1, 2020.  2.  Call rheumatology with a telephone progress report after January 1.  Further decisions about prednisone can be made then.  3.  Follow with  "obstetrics as often as obstetrics recommends to guide healthy pregnancy.      Dimitri Riggins M.D.  Staff Rheumatologist, [facility]  Pager [phone]            Active Problem List:     Patient Active Problem List    Diagnosis Date Noted     Sarcoidosis 2020     Priority: Medium     Lymphadenopathy, mediastinal 2020     Priority: Medium     Mediastinal mass 2020     Priority: Medium            History of Present Illness:   Juventino Dickerson presents for follow up of cough and mediastinal mass.  She was last seen on 2020 when evaluation for pregnancy and systemic autoimmune disease was continued.    Patient was hospitalized  through 2020 for evaluation of cough and mediastinal mass. Imaging (helical CT scan)  had shown mediastinal mass compressing the SVC anteriorly. She underwent EBUS/TBNA and BMBx on  during admission. Transbronchial biopsy showed granulomatous lymphadenitis. Further workup was begun to investigate infectious causes, rheumatologic disease, and lymphoma with ACE, urine calcium, histo, blasto, ESR, CRP. she was discharged .    Interval history 2020:    Patient saw Dr. Kramer in pulmonology on .  Assessment was of mediastinal lymph node positive for granulomas consistent with sarcoidosis.  Patient was referred to sarcoidosis specialist in Pulmonology, and to OB.  Increased risk of thromboembolic disease preeclampsia and  birth associated with sarcoid were discussed.    Patient was seen by Dr. Morales in infectious disease on .  Impression was of pulmonary granulomata, most likely sarcoid and less likely fungal disease.  Fungal antibodies were ordered.    She notes congestion in her nose and head, starting 2 days ago.     Breathing at night remains \"tough\", especially when she is lying down. No fevers. No chest pain. No skin rash or eye problems.    She breathes heavily and noisily constantly; she has low energy level. "         Interval History 12/1/20  Her cough has been persistent for a month now and she reports minimal improvement since her hospital discharge. Her use of albuterol hasn't provided much symptomatic relief. Accompanied with her cough, she has had some diffuse R shoulder/chest pain but no other joint pain is present. Pt relates that the difficulty breathing and cough are worse at night when she is sleeping. She notes having some orthopnea and night sweats. Otherwise, pt denies having any fevers, chills, and rashes, dysuria, or discoloration of her skin.    According to patient, she is now 6 weeks into her pregnancy. She does not yet have an OB/GYN provider set up, but [place] [facility] and expressed interest in returning.         Review of Systems:       Constitutional: Nocturnal sweats for 1 month.  Skin: negative  Eyes: negative. No dry eyes  Ears/Nose/Throat: negative.  Respiratory: shortness of breath, dry cough, worse at night time. Orthopnea  Cardiovascular: negative  Gastrointestinal: daily nausea/vomitting during pregnancy  Genitourinary: negative  Musculoskeletal: R shoulder pain but no other joint pain present  Neurologic: negative  Psychiatric: negative  Hematologic/Lymphatic/Immunologic: negative  Endocrine: negative          Past Medical History:     Past Medical History:   Diagnosis Date     Depressive disorder      Postpartum depression      Past Surgical History:   Procedure Laterality Date     BRONCHOSCOPY RIDID OR FLEXIBLE W/ENDOBRONCHIAL ULTRASOUND GUIDED 3 OR MORE NODE STATIONS N/A 11/24/2020    Procedure: BRONCHOSCOPY, WITH BIOPSY OF 3 OR MORE LYMPH NODE STATIONS WITH ENDOBRONCHIAL ULTRASOUND GUIDANCE;  Surgeon: Kevin Kramer MD;  Location: [facility]     --pregnancy : LMP 10/22/2020 with EDC 7/29/2021         Social History:     Social History   Lives with 9 year old daughter and 2 year old son in [place]. Currently not working.    Occupational History      Not on file   Tobacco Use     Smoking status: 3ppd since early 20s. Quit one week ago.     Smokeless tobacco: Never Used   Substance and Sexual Activity     Alcohol use: Last drink one month ago     Drug use: None     Sexual activity: Not on file     Tobacco: 3 ppd X years, no smoking for 1 week.  No recent use of inhaled substances other than tobacco, no exposures to rural areas, [place], or recent out of state travel.       Family History:   Father - Diabetes           Allergies:   No Known Allergies         Medications:     Current Outpatient Medications   Medication Sig Dispense Refill     albuterol (PROAIR HFA/PROVENTIL HFA/VENTOLIN HFA) 108 (90 Base) MCG/ACT inhaler Inhale 2 puffs into the lungs every 6 hours as needed for shortness of breath / dyspnea or wheezing 1 Inhaler 0     benzonatate (TESSALON) 200 MG capsule Take 1 capsule (200 mg) by mouth 3 times daily as needed for cough 21 capsule 0     fluticasone (FLONASE) 50 MCG/ACT nasal spray Spray 1 spray into both nostrils daily 11.1 mL 0     Prenatal Vit-Fe Fumarate-FA (PRENATAL MULTIVITAMIN W/IRON) 27-0.8 MG tablet Take 1 tablet by mouth daily 30 tablet 0            Physical Exam:   Last menstrual period 10/23/2020.  Wt Readings from Last 4 Encounters:   12/01/20 76.3 kg (168 lb 4.8 oz)   11/25/20 75.3 kg (165 lb 14.4 oz)   11/19/20 77.1 kg (170 lb)       Constitutional: well-developed, NAD  Eyes: nl EOM, PERRLA, vision, conjunctiva, sclera  ENT: nl external ears, nose, hearing, lips, teeth, gums, throat  No mucous membrane lesions, normal saliva pool  Neck: no mass or thyroid enlargement  Resp: breathing unlabored  Skin: no nail pitting, alopecia, rash, nodules or lesions  Neuro: nl cranial nerves  Psych: nl judgement, orientation, memory, affect.         Data:     No results found for any visits on 12/18/20.  RHEUM RESULTS Latest Ref Rng & Units 11/24/2020 11/25/2020 12/1/2020   SED RATE 0 - 20 mm/h - 32(H) 41(H)   CRP, INFLAMMATION 0.0 - 8.0 mg/L -  "14.0(H) 8.3(H)   AST 0 - 45 U/L - - -   ALT 0 - 50 U/L - - -   ALBUMIN 3.4 - 5.0 g/dL - - -   WBC 4.0 - 11.0 10e9/L 5.2 4.6 4.8   RBC 3.8 - 5.2 10e12/L 4.18 4.06 4.24   HGB 11.7 - 15.7 g/dL 11.9 11.5(L) 12.2   HCT 35.0 - 47.0 % 36.8 35.9 37.3   MCV 78 - 100 fl 88 88 88   MCHC 31.5 - 36.5 g/dL 32.3 32.0 32.7   RDW 10.0 - 15.0 % 14.7 14.5 14.4    - 450 10e9/L 418 382 395   CREATININE 0.52 - 1.04 mg/dL 0.69 0.68 -   GFR ESTIMATE, IF BLACK >60 mL/min/[1.73:m2] >90 >90 -   GFR ESTIMATE >60 mL/min/[1.73:m2] >90 >90 -        ,  ,  ,  ,  ,  ,  ,  ,  ,  ,  ,  ,  ,  ,   Hepatitis B Core Marci   Date Value Ref Range Status   11/23/2020 Nonreactive NR^Nonreactive Final   ,   Hep B Surface Agn   Date Value Ref Range Status   11/23/2020 Nonreactive NR^Nonreactive Final       Juventino Dickerson is a 30 year old female who is being evaluated via a billable video visit.      The patient has been notified of following:     \"This video visit will be conducted via a call between you and your physician/provider. We have found that certain health care needs can be provided without the need for an in-person physical exam.  This service lets us provide the care you need with a video conversation.  If a prescription is necessary we can send it directly to your pharmacy.  If lab work is needed we can place an order for that and you can then stop by our lab to have the test done at a later time.    Video visits are billed at different rates depending on your insurance coverage.  Please reach out to your insurance provider with any questions.    If during the course of the call the physician/provider feels a video visit is not appropriate, you will not be charged for this service.\"    Patient has given verbal consent for Video visit? Yes  How would you like to obtain your AVS? MyChart    Will anyone else be joining your video visit? No        Video-Visit Details    Type of service:  Video Visit    Video Start Time: 8:43 AM  Video End Time: " 9:12 AM    Originating Location (pt. Location): Home    Distant Location (provider location):  [facility] [place]     Platform used for Video Visit: Clive Riggins MD

## 2020-12-18 ENCOUNTER — VIRTUAL VISIT (OUTPATIENT)
Dept: RHEUMATOLOGY | Facility: CLINIC | Age: 30
End: 2020-12-18
Attending: INTERNAL MEDICINE
Payer: MEDICARE

## 2020-12-18 ENCOUNTER — VIRTUAL VISIT (OUTPATIENT)
Dept: PULMONOLOGY | Facility: CLINIC | Age: 30
End: 2020-12-18
Attending: INTERNAL MEDICINE
Payer: MEDICARE

## 2020-12-18 DIAGNOSIS — D86.9 SARCOIDOSIS: Primary | ICD-10-CM

## 2020-12-18 DIAGNOSIS — D86.0 SARCOIDOSIS, LUNG (H): Primary | ICD-10-CM

## 2020-12-18 PROCEDURE — 99205 OFFICE O/P NEW HI 60 MIN: CPT | Mod: 95 | Performed by: INTERNAL MEDICINE

## 2020-12-18 PROCEDURE — 99213 OFFICE O/P EST LOW 20 MIN: CPT | Mod: 95 | Performed by: INTERNAL MEDICINE

## 2020-12-18 RX ORDER — PREDNISONE 5 MG/1
TABLET ORAL
Qty: 28 TABLET | Refills: 1 | Status: SHIPPED | OUTPATIENT
Start: 2020-12-18 | End: 2021-07-02

## 2020-12-18 NOTE — LETTER
12/18/2020       RE: Juventino Dickerson  [address]  [facility] [address]     Dear Colleague,    Thank you for referring your patient, Juventino Dickerson, to the [facility]HEUMATOLOGY [facility] [place] at [facility]. Please see a copy of my visit note below.          Rheumatology Clinic Visit-in person   Juventino Dickerson MRN# [id number]   YOB: 1990 Age: 30 year old     Date of Visit: 12/18/2020  Primary care provider: No Ref-Primary, Physician          Assessment and Plan:     # Chronic cough, orthopnea, nighttime sweats in a young woman:   Laboratory evaluation on December 1, 2020 showed minimal elevation of CRP at 8.3 vitamin D levels were 11; low.  CBC was normal, and urinalysis showed a trace of blood without red cells.  Sedimentation rate was mildly elevated at 41.  QuantiFERON testing was negative.  PFTs performed on December 10 showed normal spirometry, lung volumes, and diffusion. Echocardiogram 12- was normal.     Fine-needle aspirate of mediastinal lymph node showed granulomatous inflammation with minimal necrosis; no evidence for malignancy. Special stains negative for fungi or acid-fast organisms.    Sarcoidosis: Apart from orthopnea, neither the current symptom complex nor the work-up suggest presence of a significant extrapulmonary inflammatory process. I appreciate input from Infectious disease and Pulmonology consultants in .     Difficulty breathing at night, and cough with shortness of breath during activities of daily living indicate inflammatory changes in the lungs.  Fortunately, pulmonary function tests and echocardiogram argue against significant inflammation in the heart, and the airways are open.  I recommend a 2-week course of prednisone to get breathing and congestion under better control.    2. Pregnancy ~ 9 weeks: Low dose prednisone during pregnancy may be used with reasonable safety.  However,  prednisone should be used at the lowest effective dose for the shortest possible time.    Plan:  1.  Start prednisone 10 mg daily.  Take prednisone until 2020.  2.  Call rheumatology with a telephone progress report after .  Further decisions about prednisone can be made then.  3.  Follow with obstetrics as often as obstetrics recommends to guide healthy pregnancy.      Dimitri Riggins M.D.  Staff Rheumatologist, [facility]  Pager [phone]            Active Problem List:     Patient Active Problem List    Diagnosis Date Noted     Sarcoidosis 2020     Priority: Medium     Lymphadenopathy, mediastinal 2020     Priority: Medium     Mediastinal mass 2020     Priority: Medium            History of Present Illness:   Juventino Dickerson presents for follow up of cough and mediastinal mass.  She was last seen on 2020 when evaluation for pregnancy and systemic autoimmune disease was continued.    Patient was hospitalized  through 2020 for evaluation of cough and mediastinal mass. Imaging (helical CT scan)  had shown mediastinal mass compressing the SVC anteriorly. She underwent EBUS/TBNA and BMBx on  during admission. Transbronchial biopsy showed granulomatous lymphadenitis. Further workup was begun to investigate infectious causes, rheumatologic disease, and lymphoma with ACE, urine calcium, histo, blasto, ESR, CRP. she was discharged .    Interval history 2020:    Patient saw Dr. Kramer in pulmonology on .  Assessment was of mediastinal lymph node positive for granulomas consistent with sarcoidosis.  Patient was referred to sarcoidosis specialist in Pulmonology, and to OB.  Increased risk of thromboembolic disease preeclampsia and  birth associated with sarcoid were discussed.    Patient was seen by Dr. Morales in infectious disease on .  Impression was of pulmonary granulomata, most likely sarcoid and less likely  "fungal disease.  Fungal antibodies were ordered.    She notes congestion in her nose and head, starting 2 days ago.     Breathing at night remains \"tough\", especially when she is lying down. No fevers. No chest pain. No skin rash or eye problems.    She breathes heavily and noisily constantly; she has low energy level.         Interval History 12/1/20  Her cough has been persistent for a month now and she reports minimal improvement since her hospital discharge. Her use of albuterol hasn't provided much symptomatic relief. Accompanied with her cough, she has had some diffuse R shoulder/chest pain but no other joint pain is present. Pt relates that the difficulty breathing and cough are worse at night when she is sleeping. She notes having some orthopnea and night sweats. Otherwise, pt denies having any fevers, chills, and rashes, dysuria, or discoloration of her skin.    According to patient, she is now 6 weeks into her pregnancy. She does not yet have an OB/GYN provider set up, but her previous care was provided at [facility] and expressed interest in returning.         Review of Systems:       Constitutional: Nocturnal sweats for 1 month.  Skin: negative  Eyes: negative. No dry eyes  Ears/Nose/Throat: negative.  Respiratory: shortness of breath, dry cough, worse at night time. Orthopnea  Cardiovascular: negative  Gastrointestinal: daily nausea/vomitting during pregnancy  Genitourinary: negative  Musculoskeletal: R shoulder pain but no other joint pain present  Neurologic: negative  Psychiatric: negative  Hematologic/Lymphatic/Immunologic: negative  Endocrine: negative          Past Medical History:     Past Medical History:   Diagnosis Date     Depressive disorder      Postpartum depression      Past Surgical History:   Procedure Laterality Date     BRONCHOSCOPY RIDID OR FLEXIBLE W/ENDOBRONCHIAL ULTRASOUND GUIDED 3 OR MORE NODE STATIONS N/A 11/24/2020    Procedure: BRONCHOSCOPY, WITH BIOPSY OF 3 OR MORE LYMPH NODE " STATIONS WITH ENDOBRONCHIAL ULTRASOUND GUIDANCE;  Surgeon: Kevin Kramer MD;  Location: [facility]     --pregnancy : LMP 10/22/2020 with EDC 7/29/2021         Social History:     Social History   Lives with 9 year old daughter and 2 year old son in [place]. Currently not working.    Occupational History     Not on file   Tobacco Use     Smoking status: 3ppd since early 20s. Quit one week ago.     Smokeless tobacco: Never Used   Substance and Sexual Activity     Alcohol use: Last drink one month ago     Drug use: None     Sexual activity: Not on file     Tobacco: 3 ppd X years, no smoking for 1 week.  No recent use of inhaled substances other than tobacco, no exposures to rural areas, farhat, or recent out of state travel.       Family History:   Father - Diabetes           Allergies:   No Known Allergies         Medications:     Current Outpatient Medications   Medication Sig Dispense Refill     albuterol (PROAIR HFA/PROVENTIL HFA/VENTOLIN HFA) 108 (90 Base) MCG/ACT inhaler Inhale 2 puffs into the lungs every 6 hours as needed for shortness of breath / dyspnea or wheezing 1 Inhaler 0     benzonatate (TESSALON) 200 MG capsule Take 1 capsule (200 mg) by mouth 3 times daily as needed for cough 21 capsule 0     fluticasone (FLONASE) 50 MCG/ACT nasal spray Spray 1 spray into both nostrils daily 11.1 mL 0     Prenatal Vit-Fe Fumarate-FA (PRENATAL MULTIVITAMIN W/IRON) 27-0.8 MG tablet Take 1 tablet by mouth daily 30 tablet 0            Physical Exam:   Last menstrual period 10/23/2020.  Wt Readings from Last 4 Encounters:   12/01/20 76.3 kg (168 lb 4.8 oz)   11/25/20 75.3 kg (165 lb 14.4 oz)   11/19/20 77.1 kg (170 lb)       Constitutional: well-developed, NAD  Eyes: nl EOM, PERRLA, vision, conjunctiva, sclera  ENT: nl external ears, nose, hearing, lips, teeth, gums, throat  No mucous membrane lesions, normal saliva pool  Neck: no mass or thyroid enlargement  Resp: breathing unlabored  Skin: no nail  "pitting, alopecia, rash, nodules or lesions  Neuro: nl cranial nerves  Psych: nl judgement, orientation, memory, affect.         Data:     No results found for any visits on 12/18/20.  RHEUM RESULTS Latest Ref Rng & Units 11/24/2020 11/25/2020 12/1/2020   SED RATE 0 - 20 mm/h - 32(H) 41(H)   CRP, INFLAMMATION 0.0 - 8.0 mg/L - 14.0(H) 8.3(H)   AST 0 - 45 U/L - - -   ALT 0 - 50 U/L - - -   ALBUMIN 3.4 - 5.0 g/dL - - -   WBC 4.0 - 11.0 10e9/L 5.2 4.6 4.8   RBC 3.8 - 5.2 10e12/L 4.18 4.06 4.24   HGB 11.7 - 15.7 g/dL 11.9 11.5(L) 12.2   HCT 35.0 - 47.0 % 36.8 35.9 37.3   MCV 78 - 100 fl 88 88 88   MCHC 31.5 - 36.5 g/dL 32.3 32.0 32.7   RDW 10.0 - 15.0 % 14.7 14.5 14.4    - 450 10e9/L 418 382 395   CREATININE 0.52 - 1.04 mg/dL 0.69 0.68 -   GFR ESTIMATE, IF BLACK >60 mL/min/[1.73:m2] >90 >90 -   GFR ESTIMATE >60 mL/min/[1.73:m2] >90 >90 -        ,  ,  ,  ,  ,  ,  ,  ,  ,  ,  ,  ,  ,  ,   Hepatitis B Core Marci   Date Value Ref Range Status   11/23/2020 Nonreactive NR^Nonreactive Final   ,   Hep B Surface Agn   Date Value Ref Range Status   11/23/2020 Nonreactive NR^Nonreactive Final       Juventino Dickerson is a 30 year old female who is being evaluated via a billable video visit.      The patient has been notified of following:     \"This video visit will be conducted via a call between you and your physician/provider. We have found that certain health care needs can be provided without the need for an in-person physical exam.  This service lets us provide the care you need with a video conversation.  If a prescription is necessary we can send it directly to your pharmacy.  If lab work is needed we can place an order for that and you can then stop by our lab to have the test done at a later time.    Video visits are billed at different rates depending on your insurance coverage.  Please reach out to your insurance provider with any questions.    If during the course of the call the physician/provider feels a video visit " "is not appropriate, you will not be charged for this service.\"    Patient has given verbal consent for Video visit? Yes  How would you like to obtain your AVS? MyChart    Will anyone else be joining your video visit? No        Video-Visit Details    Type of service:  Video Visit    Video Start Time: 8:43 AM  Video End Time: 9:12 AM    Originating Location (pt. Location): Home    Distant Location (provider location):  [facility]HEUMAT[facility]Y [facility] [place]     Platform used for Video Visit: Clive Riggins MD           "

## 2020-12-18 NOTE — PROGRESS NOTES
Pulmonary Interstitial Lung Disease Video Visit New Patient Consult  Reason for Consult: Evaluation for possible sarcoidosis.     History of Present Illness  Mrs. Juventino Dickerson is a 31yo female with 8+1 weeks of pregnancy per LMP who is being contacted today via video.    Patient's symptoms started on 11/1/20 characterized by subacute onset of shortness of breath specially with exertion, noted after climbing one flight of stairs and feels that she needs to walk slower than people of her age, initial dry cough with occasional productive white phlegm without blood streaks/hemoptysis and intermittent chest tightness that is present on a daily basis, goes for most of the day as is associated to activity, not triggered by deep breathing/coughing, no alleviating factors. Denies wheezing. Otherwise, reports fatigue and night sweats, but no fever, chills, arthralgias/join swelling, myalgias, skin rash, Raynaud's, oral ulcers, changes in weight or appetite.    Due to symptoms above, she was evaluated in the ED on 11/20/20 and workup was significant for CXR with predominant hilar LAD and chest CT-PE consistent with hilar and mediastinal LAD with compression of SVC without complete obstruction. Due to those findings she was admitted from 11/23-11/25 at [facility] for expedited evaluation of chest LADs concerning for lymphoma and she underwent BMBx with normocellular BM for age, no evidence of lymphoma/leukemia, 3 cellular lineages within normal limits and EBUS/TBNA (11/24/20) with report of non-necrotizing granulomatous lymphadenitis with stain negative for malignancy and fungi/AFB. Further imaging evidenced LAD on her R side of the neck. Lab workup was significant for elevated inflammatory markers, low vitamin D and normal level ACE, histoplasma and blastomyces were negative. Ultimately discharged with improvement in her symptoms to follow up as outpatient.    Today, she reports that her symptoms remain mostly unchanged to  stable, denotes decreased frequency of cough, but overall persisting SOB and intermittent chest tightness. Denotes no difference with the use of albuterol inhaler. She has not had a flu shot and she is not planning to have one this year.    Review of Systems:  10 of 14 systems reviewed and are negative unless otherwise stated in HPI.    Past Medical History:   Diagnosis Date     Depressive disorder      Postpartum depression        Past Surgical History:   Procedure Laterality Date     BRONCHOSCOPY RIDID OR FLEXIBLE W/ENDOBRONCHIAL ULTRASOUND GUIDED 3 OR MORE NODE STATIONS N/A 11/24/2020    Procedure: BRONCHOSCOPY, WITH BIOPSY OF 3 OR MORE LYMPH NODE STATIONS WITH ENDOBRONCHIAL ULTRASOUND GUIDANCE;  Surgeon: Kevin Kramer MD;  Location: [facility]       No family history on file.    Social History     Socioeconomic History     Marital status: Single     Spouse name: Not on file     Number of children: Not on file     Years of education: Not on file     Highest education level: Not on file   Occupational History     Not on file   Social Needs     Financial resource strain: Not on file     Food insecurity     Worry: Not on file     Inability: Not on file     Transportation needs     Medical: Not on file     Non-medical: Not on file   Tobacco Use     Smoking status: Never Smoker     Smokeless tobacco: Never Used   Substance and Sexual Activity     Alcohol use: Not Currently     Drug use: Not Currently     Sexual activity: Yes     Partners: Male   Lifestyle     Physical activity     Days per week: Not on file     Minutes per session: Not on file     Stress: Not on file   Relationships     Social connections     Talks on phone: Not on file     Gets together: Not on file     Attends Denominational service: Not on file     Active member of club or organization: Not on file     Attends meetings of clubs or organizations: Not on file     Relationship status: Not on file     Intimate partner violence     Fear of current or ex  partner: Not on file     Emotionally abused: Not on file     Physically abused: Not on file     Forced sexual activity: Not on file   Other Topics Concern     Parent/sibling w/ CABG, MI or angioplasty before 65F 55M? Not Asked   Social History Narrative     Not on file         No Known Allergies      Current Outpatient Medications:      albuterol (PROAIR HFA/PROVENTIL HFA/VENTOLIN HFA) 108 (90 Base) MCG/ACT inhaler, Inhale 2 puffs into the lungs every 6 hours as needed for shortness of breath / dyspnea or wheezing, Disp: 1 Inhaler, Rfl: 0     benzonatate (TESSALON) 200 MG capsule, Take 1 capsule (200 mg) by mouth 3 times daily as needed for cough, Disp: 21 capsule, Rfl: 0     fluticasone (FLONASE) 50 MCG/ACT nasal spray, Spray 1 spray into both nostrils daily (Patient not taking: Reported on 12/18/2020), Disp: 11.1 mL, Rfl: 0     Prenatal Vit-Fe Fumarate-FA (PRENATAL MULTIVITAMIN W/IRON) 27-0.8 MG tablet, Take 1 tablet by mouth daily (Patient not taking: Reported on 12/18/2020), Disp: 30 tablet, Rfl: 0     predniSONE (DELTASONE) 5 MG tablet, Take 2 tabs daily for 14 days for breathing, Disp: 28 tablet, Rfl: 1      Physical Exam:  LMP 10/23/2020   GENERAL: Well developed, well nourished, alert, and in no apparent distress.  RESP: No respiratory distress, talks in complete sentences.    Results:  PFTs: 12/10/20, normal PFT, no diffusion defect. 6MWT with noted 419m, lower from predicted LLN (522m) but no exertional hypoxemia.  Imaging (personally reviewed in clinic today): Noted CXR with hilar LAD, CT with mediastinal and hilar LAD, TTE with normal EF and adequate systolic/diastolic function, without valve abnormality.    Assessment and Plan:   Juventino Dickerson is a 30 year old female with current pregnancy now evaluated for possible sarcoidosis.    Mrs. Dickerson is an -american female within 20-40 age range that presents with subacute dyspnea and cough, found to have hilar and mediastinal LAD s/p biopsy with  "findings of non-necrotizing granulomatous inflammation and negative stain for fungi/AFB and malignancy. Labs noted elevated inflammatory markers, no hypercalcemia. Serology included negative histoplasma/blastomyces. Most likely diagnosis is sarcoidosis at this point and with her radiographic imaging findings represent stage I. TTE within normal limits. At this point, her PFT is within normal limits, and her symptoms remain stable. We have explained that with current presentation and extension of findings most likely outcome is spontaneous resolution of the symptoms and LAD over time and in the setting of new pregnancy, we would prefer not to initiate prednisone as it may or may not improve her symptoms and the side effect profile plus concomitant pregnancy may outweigh the benefit and we would recommend the use of albuterol. We will discuss with our Rheumatology colleagues (Dr. Riggins) regarding their input to comment on starting prednisone at this point and in the setting of pregnancy.    Questions and concerns were answered to the patient's satisfaction.  She was provided with pulmonary clinic contact information should new questions or concerns arise in the interim.    Return to clinic in 4 weeks.    Patient discussed with Staff attending, Dr. Perlman.    Ben Ta  Internal Medicine Resident PG-Y2  Pager: [phone]    Attending Note:    The patient was seen and examined by me and discussed at length with the resident. I have personally reviewed all labs, vital signs, imaging and culture results.  I have read and agree with the resident note from today which reflects our joint findings, assessment and plan.    David Perlman, M.D.    Pulmonary, Allergy and Critical Care Medicine  [facility]    Juventino Dickerson is a 30 year old female who is being evaluated via a billable video visit.      The patient has been notified of following:     \"This video visit will be " "conducted via a call between you and your physician/provider. We have found that certain health care needs can be provided without the need for an in-person physical exam.  This service lets us provide the care you need with a video conversation.  If a prescription is necessary we can send it directly to your pharmacy.  If lab work is needed we can place an order for that and you can then stop by our lab to have the test done at a later time.    Video visits are billed at different rates depending on your insurance coverage.  Please reach out to your insurance provider with any questions.    If during the course of the call the physician/provider feels a video visit is not appropriate, you will not be charged for this service.\"    Patient has given verbal consent for Video visit? Yes  How would you like to obtain your AVS? MyChart  If you are dropped from the video visit, the video invite should be resent to:   Will anyone else be joining your video visit?         Video-Visit Details    Type of service:  Video Visit    Video Start Time: 8:00  Video End Time: 8:30 AM    Originating Location (pt. Location): Home    Distant Location (provider location):  [facility]     Platform used for Video Visit: [employer] David Perlman, M.D.    Pulmonary, Allergy and Critical Care Medicine  [facility]          "

## 2020-12-18 NOTE — PROGRESS NOTES
Maternal-Fetal Medicine Consultation    Juventino Dickerson  : 1990  MRN: [id number]    REFERRAL:  Juventino Dickerson is a 30 year old sent by Dr. Riggins from rheumatology clinic for MFM consultation.    HPI:  Juventino Dickerson is a 30 year old  at 8w4d by LMP consistent with US today here for MFM consultation regarding new diagnosis of sarcoidosis.    Patient was recently diagnosed with sarcoidosis in 2020.  She initially developed subacute onset of productive cough, dyspnea, associated chest pain, night sweats, and occasional orthopnea.  She presented to the ED on  and underwent CXR, which demonstrated bilateral hilar fullness.  She then underwent CT chest, which showed multiple enlarged mediastinal and bilateral hilar lymph nodes with concern for lymphoma.  Of note, she did not have any evidence of significant anemia, thrombocytopenia, or leukopenia and was discharged from the ED. On , she was admitted to the hospital for an expedited work-up of the mediastinal mass.  Of note, her admission Covid-19 test was negative.  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  Gram stains were negative for fungi and acid-fast bacilli and she was discharged on .     Given the concern for a diagnosis of sarcoidosis, she was referred to rheumatology and was seen by Dr. Dimitri Riggins.  Prior to treating with high-dose steroids, the patient was referred to both pulmonology and infectious disease to exclude the diagnosis of an opportunistic infection. She underwent additional studies including an echocardiogram which showed an EF of 60-65% on 12/10 as well as normal PFTs.  She was again seen by Dr. Riggisn on  and was started on prednisone 10 mg daily for 14 day course.    Since that time, she continues to note dyspnea at rest, but worse upon exertion. She endorses decrease in the  frequency of coughing episodes as well as her sputum becoming less brown in color. She states that she is planning to follow up with rheumatology after she completes this steroid course.    With respect to the pregnancy, the patient's LMP was 10/23.  Of note, the patient was a previous daily cigarette smoker of 3ppd for the past 8 years, but quit in November and has remained abstinent. She has a [place] [facility] [place] 2018 was at 41 weeks and 4 days and complicated by a greater than 1 minute shoulder dystocia of a 9 pound 15 oz liveborn male as well as a PPH of 1.1L. She denies a historyof GDM.    Prenatal Care:  Has not yet established prenatal care    Obstetrics History:  OB History    Para Term  AB Living   4 2 2 0 1 2   SAB TAB Ectopic Multiple Live Births   1 0 0 0 2      # Outcome Date GA Lbr Paulino/2nd Weight Sex Delivery Anes PTL Lv   4 Current            3 Term 18 41w4d  4.518 kg (9 lb 15.4 oz) M Vag-Spont   ALLEGRA      Complications: Shoulder Dystocia, Hemorrhage   2 SAB 2013 6w0d          1 Term 12/15/11 39w3d   F Vag-Spont   ALLEGRA      Complications: Postpartum hemorrhage       Gynecologic History:  - LMP: 10/23/2020  - Last Pap: 10/2019 NILM, HPV: neg   - Denies prior cervical surgery or procedures  - Hx chlamydia upon chart review , though she denies any history of frequent UTIs, vaginal infections, or STIs upon interview,    Past Medical History:  Past Medical History:   Diagnosis Date     Depressive disorder      History of shoulder dystocia in prior pregnancy      Postpartum depression      Pulmonary sarcoidosis (H)        Past Surgical History:  Past Surgical History:   Procedure Laterality Date     BRONCHOSCOPY RIDID OR FLEXIBLE W/ENDOBRONCHIAL ULTRASOUND GUIDED 3 OR MORE NODE STATIONS N/A 2020    Procedure: BRONCHOSCOPY, WITH BIOPSY OF 3 OR MORE LYMPH NODE STATIONS WITH ENDOBRONCHIAL ULTRASOUND GUIDANCE;  Surgeon: Nia  Kevin Leon MD;  Location:  GI       Current Medications:  Prior to Admission medications    Medication Sig Last Dose Taking? Auth Provider   albuterol (PROAIR HFA/PROVENTIL HFA/VENTOLIN HFA) 108 (90 Base) MCG/ACT inhaler Inhale 2 puffs into the lungs every 6 hours as needed for shortness of breath / dyspnea or wheezing   Dyan Yen MD   benzonatate (TESSALON) 200 MG capsule Take 1 capsule (200 mg) by mouth 3 times daily as needed for cough   Rajeev Meraz MD   fluticasone (FLONASE) 50 MCG/ACT nasal spray Spray 1 spray into both nostrils daily  Patient not taking: Reported on 12/18/2020   Jimmy Azul MD   predniSONE (DELTASONE) 5 MG tablet Take 2 tabs daily for 14 days for breathing   Dimitri Riggins MD   Prenatal Vit-Fe Fumarate-FA (PRENATAL MULTIVITAMIN W/IRON) 27-0.8 MG tablet Take 1 tablet by mouth daily  Patient not taking: Reported on 12/18/2020   Asmita Shelton PA-C       Allergies:  Patient has no known allergies.    Social History:   Occupation:   Status: Lives with children; FOB involved. Feels safe at home.  Previously smoked 3ppd since age 21. Quit since 11/1. Denies current use of alcohol, drugs or smoking.    Family History:  Denies history of genetic disorders, preeclampsia, thromboembolic disease, bleeding disorders, developmental delay.    ROS:  10-point ROS negative except as in HPI     PHYSICAL EXAM:  /76   Pulse 88   Resp 20   LMP 10/23/2020   SpO2 100%      Gen Appear: NAD  CV: RRR  Pulm: CTAB w/o crackles, wheezes, or rhonchi, non-labored breathing, able to speak in full sentences without respiratory difficulty  Abdomen; soft, nontender  Extrem: no LE edema    Other Imaging:   PFT 12/10 normal    Echocardiogram 12/1/2020  Interpretation Summary  Left ventricular function, chamber size, wall motion, and wall thickness are  normal.The EF is 60-65%.  Right ventricular function, chamber size, wall motion, and thickness are  normal.  No  pericardial effusion is present.  IVC diameter <2.1 cm collapsing >50% with sniff suggests a normal RA pressure  of 3 mmHg.        ASSESSMENT/PLAN:  Juventino Dickerson is a 30 year old  at 8w4d by LMP consistent today's US here for MFM consultation regarding:       Pulmonary sarcoidosis  Sarcoidosis is a multisystem inflammatory disorder that is characterized by the presence of noncaseating granulomas in affected organs.  It is estimated to affect up to 20 per 100,000 individuals and is 3-4 times more common in black Americans.  Among women, the peak age at diagnosis 50-69 though women of reproductive age are also affected.  The cause of sarcoidosis is not known, though there have been reports of familial clustering related to a section within MHC on chromosome 6.     Sarcoidosis most commonly affects the lung, but in almost 1/3 patients there are other organ systems that are affected, including the skin, lymph node, eye, and liver, among others.  Patients with pulmonary sarcoidosis are at an increased risk for complications, including venous thromboembolism and pulmonary hypertension.  Due to the risk of sarcoidosis affecting other organ systems aside from the lung, routine monitoring in patients with active disease is recommended.  This includes, but is not limited to, laboratory evaluation with CBC with differential, CMP, vitamin D, EKG, and ophthalmologic exam, as recommended by rheumatology.     With respect to pregnancy, there are documented associated maternal risks.  In a recent retrospective cohort study, published 2020, Kocher et al, studied outcomes of women with sarcoidosis compared to controls without this disease. 182 nulliparous women with sarcoidosis with servin gestations were studied.  This study found woman with sarcoidosis were at an increased risk for preeclampsia and  delivery as well as an increased rest of  delivery and birth defects.  It is important to note,  however, in the sarcoidosis cohort of women, there was a higher prevalence of chronic hypertension and pregestational diabetes.     Similar to other rheumatologic conditions, sarcoidosis is typically well tolerated during pregnancy.  Pregnancy itself is not known to exacerbate the disease process, and some women may actually experience improvement of their symptoms during pregnancy. Given Ms. Dickerson's normal PFTs and echo, would anticipate that she will tolerate this pregnancy quite well. However, there is an increased risk for flares during the 3-6 month postpartum.       Recommendations:  Pulmonary Sarcoidosis    Close follow-up and monitoring with rheumatology.    Obtain baseline HELLP labs and urine protein:creatitine ratio. If UPC elevated, consider 24 hour urine protein.    Initiation of low dose aspirin between 12-16 weeks gestation for preeclampsia prophylaxis.    Consider opthalmologic examination as patient has not yet obtained.    Level II ultrasound to evaluate fetal anatomy.    Serial growth ultrasounds every 4-6 weeks to assess fetal growth.    Weekly  testing at 32 weeks with either NST plus MVP or BPP.     Routine    Patient planning to establish care with [facility].    Counseling regarding delivery given history of shoulder dystocia at the discretion of her primary OBGYN provider.    Patient unable to go to lab to obtain routine prenatal serologies. To be drawn at intake OB with primary provider.    Declines aneuploidy testing.    The patient was seen and evaluated with Dr. Gaming.    Thank you for allowing us to participate in the care of your patient. Please do not hesitate to contact us if you have further questions regarding the management of your patient.       Selam Cleveland MD  Maternal Fetal Medicine Fellow  2020 5:18 PM        Physician Attestation   I, Awilda Gaming DO, saw and evaluated Juventino Dickerson with the Fellow.     I personally reviewed  the vital signs, medications, labs and imaging.    My key history or physical exam findings:   Juventino was recently diagnosed with pulmonary sarcoidosis in the setting of early pregnancy.  After extensive work up, the diagnosis is consistent with sarcoidosis.  She is on prednisone 10 mg daily and will complete a 10 day course.  She has a follow up with Rheum in Jan.  Her PFTs and echo were normal without evidence of pulm HTN or restrictive lung disease.     Key management decisions made by me:   Juventino's pregnancy course will likely go well given that she doesn't have restrictive lung dz or changes in her echocardiogram.  Overall there is an increased rate for low birth weight infants and a higher c/s rate.  Patient's with poorer pregnancy outcomes are those with parenchymal lung disease, AMA, low inflammatory activity, medication other than steroids, presence of extrapulmonary disease.  We discussed the role of steroids and the plan to be on the lowest dose, shortest course possible.  However, if she has continued symptoms/flares that respond to steroids she could utilize steroids throughout the pregnancy as needed.  Today's US is reassuring, but very early.  We will plan to perform a comprehensive US at 18-20 weeks and serial growth US afterwards.  A baby ASA is recommended as there are reports of a higher risk for preeclampsia. Patient has declined genetic screening.     Awilda Gaming DO  Date of Service (when I saw the patient): 12/28/20    Time Spent on this Encounter   I, Awilda Gaming DO, spent a total of 40 minutes face to face or coordinating care of Juventino Dickerson.  Over 50% of my time on the unit was spent counseling the patient and/or coordinating care regarding newly diagnosed sarcoidosis in pregnancy.

## 2020-12-18 NOTE — LETTER
12/18/2020         RE: Juventino Dickerson  [address]  [place] [address]        Dear Colleague,    Thank you for referring your patient, Juventino Dickerson, to the [facility] AND [facility]. Please see a copy of my visit note below.    Pulmonary Interstitial Lung Disease Video Visit New Patient Consult  Reason for Consult: Evaluation for possible sarcoidosis.     History of Present Illness  Mrs. Juventino Dickerson is a 29yo female with 8+1 weeks of pregnancy per LMP who is being contacted today via video.    Patient's symptoms started on 11/1/20 characterized by subacute onset of shortness of breath specially with exertion, noted after climbing one flight of stairs and feels that she needs to walk slower than people of her age, initial dry cough with occasional productive white phlegm without blood streaks/hemoptysis and intermittent chest tightness that is present on a daily basis, goes for most of the day as is associated to activity, not triggered by deep breathing/coughing, no alleviating factors. Denies wheezing. Otherwise, reports fatigue and night sweats, but no fever, chills, arthralgias/join swelling, myalgias, skin rash, Raynaud's, oral ulcers, changes in weight or appetite.    Due to symptoms above, she was evaluated in the ED on 11/20/20 and workup was significant for CXR with predominant hilar LAD and chest CT-PE consistent with hilar and mediastinal LAD with compression of SVC without complete obstruction. Due to those findings she was admitted from 11/23-11/25 at [facility] for expedited evaluation of chest LADs concerning for lymphoma and she underwent BMBx with normocellular BM for age, no evidence of lymphoma/leukemia, 3 cellular lineages within normal limits and EBUS/TBNA (11/24/20) with report of non-necrotizing granulomatous lymphadenitis with stain negative for malignancy and fungi/AFB. Further imaging evidenced LAD on her R side of the neck.  Lab workup was significant for elevated inflammatory markers, low vitamin D and normal level ACE, histoplasma and blastomyces were negative. Ultimately discharged with improvement in her symptoms to follow up as outpatient.    Today, she reports that her symptoms remain mostly unchanged to stable, denotes decreased frequency of cough, but overall persisting SOB and intermittent chest tightness. Denotes no difference with the use of albuterol inhaler. She has not had a flu shot and she is not planning to have one this year.    Review of Systems:  10 of 14 systems reviewed and are negative unless otherwise stated in HPI.    Past Medical History:   Diagnosis Date     Depressive disorder      Postpartum depression        Past Surgical History:   Procedure Laterality Date     BRONCHOSCOPY RIDID OR FLEXIBLE W/ENDOBRONCHIAL ULTRASOUND GUIDED 3 OR MORE NODE STATIONS N/A 11/24/2020    Procedure: BRONCHOSCOPY, WITH BIOPSY OF 3 OR MORE LYMPH NODE STATIONS WITH ENDOBRONCHIAL ULTRASOUND GUIDANCE;  Surgeon: Kevin Kramer MD;  Location: [facility]       No family history on file.    Social History     Socioeconomic History     Marital status: Single     Spouse name: Not on file     Number of children: Not on file     Years of education: Not on file     Highest education level: Not on file   Occupational History     Not on file   Social Needs     Financial resource strain: Not on file     Food insecurity     Worry: Not on file     Inability: Not on file     Transportation needs     Medical: Not on file     Non-medical: Not on file   Tobacco Use     Smoking status: Never Smoker     Smokeless tobacco: Never Used   Substance and Sexual Activity     Alcohol use: Not Currently     Drug use: Not Currently     Sexual activity: Yes     Partners: Male   Lifestyle     Physical activity     Days per week: Not on file     Minutes per session: Not on file     Stress: Not on file   Relationships     Social connections     Talks on phone: Not  on file     Gets together: Not on file     Attends Yarsanism service: Not on file     Active member of club or organization: Not on file     Attends meetings of clubs or organizations: Not on file     Relationship status: Not on file     Intimate partner violence     Fear of current or ex partner: Not on file     Emotionally abused: Not on file     Physically abused: Not on file     Forced sexual activity: Not on file   Other Topics Concern     Parent/sibling w/ CABG, MI or angioplasty before 65F 55M? Not Asked   Social History Narrative     Not on file         No Known Allergies      Current Outpatient Medications:      albuterol (PROAIR HFA/PROVENTIL HFA/VENTOLIN HFA) 108 (90 Base) MCG/ACT inhaler, Inhale 2 puffs into the lungs every 6 hours as needed for shortness of breath / dyspnea or wheezing, Disp: 1 Inhaler, Rfl: 0     benzonatate (TESSALON) 200 MG capsule, Take 1 capsule (200 mg) by mouth 3 times daily as needed for cough, Disp: 21 capsule, Rfl: 0     fluticasone (FLONASE) 50 MCG/ACT nasal spray, Spray 1 spray into both nostrils daily (Patient not taking: Reported on 12/18/2020), Disp: 11.1 mL, Rfl: 0     Prenatal Vit-Fe Fumarate-FA (PRENATAL MULTIVITAMIN W/IRON) 27-0.8 MG tablet, Take 1 tablet by mouth daily (Patient not taking: Reported on 12/18/2020), Disp: 30 tablet, Rfl: 0     predniSONE (DELTASONE) 5 MG tablet, Take 2 tabs daily for 14 days for breathing, Disp: 28 tablet, Rfl: 1      Physical Exam:  LMP 10/23/2020   GENERAL: Well developed, well nourished, alert, and in no apparent distress.  RESP: No respiratory distress, talks in complete sentences.    Results:  PFTs: 12/10/20, normal PFT, no diffusion defect. 6MWT with noted 419m, lower from predicted LLN (522m) but no exertional hypoxemia.  Imaging (personally reviewed in clinic today): Noted CXR with hilar LAD, CT with mediastinal and hilar LAD, TTE with normal EF and adequate systolic/diastolic function, without valve abnormality.    Assessment  and Plan:   Juventino Dickerson is a 30 year old female with current pregnancy now evaluated for possible sarcoidosis.    Mrs. Dickerson is an -american female within 20-40 age range that presents with subacute dyspnea and cough, found to have hilar and mediastinal LAD s/p biopsy with findings of non-necrotizing granulomatous inflammation and negative stain for fungi/AFB and malignancy. Labs noted elevated inflammatory markers, no hypercalcemia. Serology included negative histoplasma/blastomyces. Most likely diagnosis is sarcoidosis at this point and with her radiographic imaging findings represent stage I. TTE within normal limits. At this point, her PFT is within normal limits, and her symptoms remain stable. We have explained that with current presentation and extension of findings most likely outcome is spontaneous resolution of the symptoms and LAD over time and in the setting of new pregnancy, we would prefer not to initiate prednisone as it may or may not improve her symptoms and the side effect profile plus concomitant pregnancy may outweigh the benefit and we would recommend the use of albuterol. We will discuss with our Rheumatology colleagues (Dr. Riggins) regarding their input to comment on starting prednisone at this point and in the setting of pregnancy.    Questions and concerns were answered to the patient's satisfaction.  She was provided with pulmonary clinic contact information should new questions or concerns arise in the interim.    Return to clinic in 4 weeks.    Patient discussed with Staff attending, Dr. Perlman.    Ben Ta  Internal Medicine Resident PG-Y2  Pager: [phone]    Attending Note:    The patient was seen and examined by me and discussed at length with the resident. I have personally reviewed all labs, vital signs, imaging and culture results.  I have read and agree with the resident note from today which reflects our joint findings, assessment and  "plan.    David Perlman, M.D.    Pulmonary, Allergy and Critical Care Medicine  [facility]    Juventino Dickerson is a 30 year old female who is being evaluated via a billable video visit.      The patient has been notified of following:     \"This video visit will be conducted via a call between you and your physician/provider. We have found that certain health care needs can be provided without the need for an in-person physical exam.  This service lets us provide the care you need with a video conversation.  If a prescription is necessary we can send it directly to your pharmacy.  If lab work is needed we can place an order for that and you can then stop by our lab to have the test done at a later time.    Video visits are billed at different rates depending on your insurance coverage.  Please reach out to your insurance provider with any questions.    If during the course of the call the physician/provider feels a video visit is not appropriate, you will not be charged for this service.\"    Patient has given verbal consent for Video visit? Yes  How would you like to obtain your AVS? MyChart  If you are dropped from the video visit, the video invite should be resent to:   Will anyone else be joining your video visit?         Video-Visit Details    Type of service:  Video Visit    Video Start Time: 8:00  Video End Time: 8:30 AM    Originating Location (pt. Location): Home    Distant Location (provider location):  [facility] FOR LUNG SCIENCE AND [facility]     Platform used for Video Visit: [employer] David Perlman, M.D.    Pulmonary, Allergy and Critical Care Medicine  [facility]        "

## 2020-12-18 NOTE — PROGRESS NOTES
"Juventino Dickerson is a 30 year old female who is being evaluated via a billable video visit.      The patient has been notified of following:     \"This video visit will be conducted via a call between you and your physician/provider. We have found that certain health care needs can be provided without the need for an in-person physical exam.  This service lets us provide the care you need with a video conversation.  If a prescription is necessary we can send it directly to your pharmacy.  If lab work is needed we can place an order for that and you can then stop by our lab to have the test done at a later time.    Video visits are billed at different rates depending on your insurance coverage.  Please reach out to your insurance provider with any questions.    If during the course of the call the physician/provider feels a video visit is not appropriate, you will not be charged for this service.\"    Patient has given verbal consent for Video visit? Yes  How would you like to obtain your AVS? [email or web address]  If you are dropped from the video visit, the video invite should be resent to: Other e-mail: [employer]  Will anyone else be joining your video visit? No  {If patient encounters technical issues they should call [phone] :[id number]}      Video-Visit Details    Type of service:  Video Visit    Video Start Time: 8am  Video End Time: {video visit start/end time:490677}    Originating Location (pt. Location): Home    Distant Location (provider location):  [facility] FOR LUNG SCIENCE AND [facility]     Platform used for Video Visit: Clive Joseph        "

## 2020-12-18 NOTE — PATIENT INSTRUCTIONS
Diagnosis:  1. Sarcoidosis: lungs and chest lymph nodes are the major areas of involvement.  Difficulty breathing at night, and cough with shortness of breath during activities of daily living indicate inflammatory changes in the lungs.  Fortunately, pulmonary function tests and echocardiogram argue against significant inflammation in the heart, and the airways are open.  I recommend a 2-week course of prednisone to get breathing and congestion under better control.    2. Pregnancy ~ 9 weeks: Low dose prednisone during pregnancy may be used with reasonable safety.  However, prednisone should be used at the lowest effective dose for the shortest possible time.    Plan:  1.  Start prednisone 10 mg daily.  Take prednisone until January 1, 2020.  2.  Call rheumatology with a telephone progress report after January 1.  Further decisions about prednisone can be made then.  3.  Follow with obstetrics as often as obstetrics recommends to guide healthy pregnancy.

## 2020-12-19 LAB
ASPERGILLUS AB TITR SER CF: NORMAL {TITER}
B DERMAT AB SER-ACNC: 0.3 IV
COCCIDIOIDES AB TITR SER CF: NORMAL {TITER}
H CAPSUL MYC AB TITR SER CF: NORMAL {TITER}
H CAPSUL YST AB TITR SER CF: NORMAL {TITER}

## 2020-12-20 LAB
ASPERGILLUS AB SER QL ID: NORMAL
B DERMAT AB SER QL ID: NORMAL
COCCIDIOIDES AB SPEC QL ID: NORMAL
H CAPSUL AB TITR SER ID: NORMAL {TITER}

## 2020-12-22 ENCOUNTER — TELEPHONE (OUTPATIENT)
Dept: RHEUMATOLOGY | Facility: CLINIC | Age: 30
End: 2020-12-22

## 2020-12-22 ENCOUNTER — HOSPITAL ENCOUNTER (OUTPATIENT)
Dept: ULTRASOUND IMAGING | Facility: CLINIC | Age: 30
End: 2020-12-22
Attending: OBSTETRICS & GYNECOLOGY
Payer: MEDICARE

## 2020-12-22 ENCOUNTER — OFFICE VISIT (OUTPATIENT)
Dept: MATERNAL FETAL MEDICINE | Facility: CLINIC | Age: 30
End: 2020-12-22
Attending: OBSTETRICS & GYNECOLOGY
Payer: MEDICARE

## 2020-12-22 VITALS
DIASTOLIC BLOOD PRESSURE: 76 MMHG | SYSTOLIC BLOOD PRESSURE: 129 MMHG | OXYGEN SATURATION: 100 % | RESPIRATION RATE: 20 BRPM | HEART RATE: 88 BPM

## 2020-12-22 DIAGNOSIS — O09.90 HIGH-RISK PREGNANCY, UNSPECIFIED TRIMESTER: Primary | ICD-10-CM

## 2020-12-22 DIAGNOSIS — J98.59 MEDIASTINAL MASS: ICD-10-CM

## 2020-12-22 DIAGNOSIS — O09.90 HIGH-RISK PREGNANCY, UNSPECIFIED TRIMESTER: ICD-10-CM

## 2020-12-22 PROCEDURE — 76801 OB US < 14 WKS SINGLE FETUS: CPT

## 2020-12-22 PROCEDURE — 99203 OFFICE O/P NEW LOW 30 MIN: CPT | Mod: 25 | Performed by: OBSTETRICS & GYNECOLOGY

## 2020-12-22 PROCEDURE — 76801 OB US < 14 WKS SINGLE FETUS: CPT | Mod: 26 | Performed by: OBSTETRICS & GYNECOLOGY

## 2020-12-22 NOTE — NURSING NOTE
Juventino here for 1st tri U/S and MFM consult due to preg c/b maternal sarcodosis. Dr. Gaming and Dr. Cleveland in to see pt. See consult note. Pt to come back at 18 weeks for comp. Pt left amb and stable.Shreya Calderón RN

## 2020-12-23 ENCOUNTER — MYC MEDICAL ADVICE (OUTPATIENT)
Dept: OBGYN | Facility: CLINIC | Age: 30
End: 2020-12-23

## 2020-12-23 ENCOUNTER — TELEPHONE (OUTPATIENT)
Dept: OBGYN | Facility: CLINIC | Age: 30
End: 2020-12-23

## 2020-12-23 NOTE — TELEPHONE ENCOUNTER
Called Juventino and advised that if she desires 3D mammo she will need to reschedule to [facility].  If MRI is desired, will need to discuss with her provider for an order.

## 2020-12-23 NOTE — TELEPHONE ENCOUNTER
Disregard below messages-incorrect patient.  Called patient to advise that she does not need US before OB intake, as she has just had one at [facility]

## 2020-12-28 ENCOUNTER — OFFICE VISIT (OUTPATIENT)
Dept: OBGYN | Facility: CLINIC | Age: 30
End: 2020-12-28
Attending: ADVANCED PRACTICE MIDWIFE
Payer: MEDICARE

## 2020-12-28 ENCOUNTER — HOSPITAL ENCOUNTER (EMERGENCY)
Facility: CLINIC | Age: 30
Discharge: HOME | End: 2020-12-28
Payer: MEDICARE

## 2020-12-28 VITALS
HEART RATE: 103 BPM | BODY MASS INDEX: 33.96 KG/M2 | SYSTOLIC BLOOD PRESSURE: 124 MMHG | WEIGHT: 179.9 LBS | HEIGHT: 61 IN | DIASTOLIC BLOOD PRESSURE: 77 MMHG

## 2020-12-28 VITALS
SYSTOLIC BLOOD PRESSURE: 121 MMHG | TEMPERATURE: 98.8 F | RESPIRATION RATE: 16 BRPM | OXYGEN SATURATION: 98 % | WEIGHT: 177 LBS | HEART RATE: 96 BPM | BODY MASS INDEX: 33.44 KG/M2 | DIASTOLIC BLOOD PRESSURE: 77 MMHG

## 2020-12-28 DIAGNOSIS — O09.299 HISTORY OF POSTPARTUM HEMORRHAGE, CURRENTLY PREGNANT: ICD-10-CM

## 2020-12-28 DIAGNOSIS — O09.891 SUPERVISION OF OTHER HIGH RISK PREGNANCIES, FIRST TRIMESTER: ICD-10-CM

## 2020-12-28 DIAGNOSIS — O09.299 HX OF SHOULDER DYSTOCIA IN PRIOR PREGNANCY, CURRENTLY PREGNANT: ICD-10-CM

## 2020-12-28 DIAGNOSIS — Z11.59 ENCOUNTER FOR SCREENING FOR OTHER VIRAL DISEASES: ICD-10-CM

## 2020-12-28 DIAGNOSIS — O23.21: ICD-10-CM

## 2020-12-28 DIAGNOSIS — R79.89 OTHER SPECIFIED ABNORMAL FINDINGS OF BLOOD CHEMISTRY: ICD-10-CM

## 2020-12-28 PROBLEM — O09.529 SUPERVISION OF HIGH-RISK PREGNANCY OF ELDERLY MULTIGRAVIDA: Status: ACTIVE | Noted: 2020-12-28

## 2020-12-28 PROCEDURE — 99207 PR PRENATAL VISIT: CPT | Performed by: ADVANCED PRACTICE MIDWIFE

## 2020-12-28 PROCEDURE — G0463 HOSPITAL OUTPT CLINIC VISIT: HCPCS

## 2020-12-28 SDOH — ECONOMIC STABILITY: FOOD INSECURITY: WITHIN THE PAST 12 MONTHS, YOU WORRIED THAT YOUR FOOD WOULD RUN OUT BEFORE YOU GOT MONEY TO BUY MORE.: NOT ASKED

## 2020-12-28 SDOH — ECONOMIC STABILITY: TRANSPORTATION INSECURITY
IN THE PAST 12 MONTHS, HAS THE LACK OF TRANSPORTATION KEPT YOU FROM MEDICAL APPOINTMENTS OR FROM GETTING MEDICATIONS?: NOT ASKED

## 2020-12-28 SDOH — ECONOMIC STABILITY: INCOME INSECURITY: HOW HARD IS IT FOR YOU TO PAY FOR THE VERY BASICS LIKE FOOD, HOUSING, MEDICAL CARE, AND HEATING?: NOT ASKED

## 2020-12-28 SDOH — ECONOMIC STABILITY: FOOD INSECURITY: WITHIN THE PAST 12 MONTHS, THE FOOD YOU BOUGHT JUST DIDN'T LAST AND YOU DIDN'T HAVE MONEY TO GET MORE.: NOT ASKED

## 2020-12-28 SDOH — ECONOMIC STABILITY: TRANSPORTATION INSECURITY
IN THE PAST 12 MONTHS, HAS LACK OF TRANSPORTATION KEPT YOU FROM MEETINGS, WORK, OR FROM GETTING THINGS NEEDED FOR DAILY LIVING?: NOT ASKED

## 2020-12-28 ASSESSMENT — MIFFLIN-ST. JEOR: SCORE: 1478.4

## 2020-12-28 NOTE — PROGRESS NOTES
[facility] OB Intake note  Subjective   30 year old female presents to clinic for initiation of OB care. Patient's last menstrual period was 10/23/2020.  at 9w3d by Estimated Date of Delivery: 2021 based on LMP and US confirms. Pregnancy is planned. 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 Pt is being followed by [facility]. Has history of PPHx2 and shoulder dystocia with 4518g baby at 41w4d.    Partner name - Deo.      Symptoms since LMP include nausea. Patient has tried these relief measures: vitamin B-6 and Unisom which helps.    OB History    Para Term  AB Living   4 2 2 0 1 2   SAB TAB Ectopic Multiple Live Births   1 0 0 0 2      # Outcome Date GA Lbr Paulino/2nd Weight Sex Delivery Anes PTL Lv   4 Current            3 Term 18 41w4d  4.518 kg (9 lb 15.4 oz) M Vag-Spont   ALLEGRA      Complications: Shoulder Dystocia, Hemorrhage   2 SAB 2013 6w0d          1 Term 12/15/11 39w3d   F Vag-Spont   ALLEGRA      Complications: Postpartum hemorrhage       - Genetic/Infection questionnaire completed, risks include none. Pt declines genetic testing. Pt  does not have a recent known exposure to Parvo or CMV so IgG/IgM testing WILL NOT be ordered.   Recommended Flu Vaccine.  Patient declined, do not offer  Have you traveled during the pregnancy? No  Have your sexual partner(s) travelled during the pregnancy? No       - Current Medications   Current Outpatient Medications   Medication Sig Dispense Refill     albuterol (PROAIR HFA/PROVENTIL HFA/VENTOLIN HFA) 108 (90 Base) MCG/ACT inhaler Inhale 2 puffs into the lungs every 6 hours as needed for shortness of breath / dyspnea or wheezing 1 Inhaler 0     fluticasone (FLONASE) 50 MCG/ACT nasal spray Spray 1 spray into both nostrils daily 11.1 mL 0     predniSONE (DELTASONE) 5 MG tablet Take 2 tabs daily for 14 days for breathing 28 tablet 1     Prenatal Vit-Fe Fumarate-FA  (PRENATAL MULTIVITAMIN W/IRON) 27-0.8 MG tablet Take 1 tablet by mouth daily 30 tablet 0     - Co-morbids   Past Medical History:   Diagnosis Date     Depressive disorder      History of shoulder dystocia in prior pregnancy      Postpartum depression      Pulmonary sarcoidosis (H)      - Risk for GDM : Pre pregnancy BMI>30 and First degree relative with GDM or DM   WILL have an early GCT and Hgb A1C    - High risk factors for Pre E- per M high risk for pre-e due to sarcoidosis.    - Moderate risk factor for Pre E Pre Pregnancy body mass index >30  and Sociodemographic characteristics (Racism, Less access given lower SES)  Meets one high risk factors or two or more of the moderate risk facrtors  so WILL consider starting low dose aspirin (81mg) starting between 12 and 28 weeks to prevent early onset preeclampsia    - The patient  does not have a history of spontaneous  birth so  WILL NOT consider progesterone starting at 16-20 weeks and/or serial transvaginal cervical length ultrasounds from 16-24 weeks.     -The patient does not have a history of immunosuppresion or HIV so Toxoplasma IgG/IgM WILL NOT be ordered.    PERSONAL/SOCIAL HISTORY  Partnered  Lives with children, not partner.  Employment: Unemployed.  History of anxiety or depression - Yes. Currently feels stable and well supported. Feels safe at home.  If Yes, therapy/medication/hospitalization - No.   Additional items: denies any physical, sexual or domestic abuse.    Objective  -VS: reviewed and within normal limits   -General appearance: no acute distress, patient is comfortable   NEUROLOGICAL/PSYCHIATRIC   - Orientated x3,   -Mood and affect: normal     Note from [facility] on  recommendations:  Pulmonary Sarcoidosis    Close follow-up and monitoring with rheumatology.    Obtain baseline HELLP labs and urine protein:creatitine ratio. If UPC elevated, consider 24 hour urine protein.    Initiation of low dose aspirin between 12-16 weeks gestation  for preeclampsia prophylaxis.    Consider opthalmologic examination as patient has not yet obtained.    Level II ultrasound to evaluate fetal anatomy.    Serial growth ultrasounds every 4-6 weeks to assess fetal growth.    Weekly  testing at 32 weeks with either NST plus MVP or BPP.      Routine    Patient planning to establish care with [facility].    Counseling regarding delivery given history of shoulder dystocia at the discretion of her primary OBGYN provider.    Declines aneuploidy testing.    Assessment/Plan  Juventino was seen today for prenatal care.    Diagnoses and all orders for this visit:    Body mass index (BMI) 34.0-34.9, adult     Supervision of other high risk pregnancies, first trimester    Hx of shoulder dystocia in prior pregnancy, currently pregnant    History of postpartum hemorrhage, currently pregnant      30 year old  9w3d weeks of pregnancy with MARGE of 2021 by LMP. Patient's last menstrual period was 10/23/2020.. Ultrasound confirms.   Outpatient Encounter Medications as of 2020   Medication Sig Dispense Refill     albuterol (PROAIR HFA/PROVENTIL HFA/VENTOLIN HFA) 108 (90 Base) MCG/ACT inhaler Inhale 2 puffs into the lungs every 6 hours as needed for shortness of breath / dyspnea or wheezing 1 Inhaler 0     fluticasone (FLONASE) 50 MCG/ACT nasal spray Spray 1 spray into both nostrils daily 11.1 mL 0     predniSONE (DELTASONE) 5 MG tablet Take 2 tabs daily for 14 days for breathing 28 tablet 1     Prenatal Vit-Fe Fumarate-FA (PRENATAL MULTIVITAMIN W/IRON) 27-0.8 MG tablet Take 1 tablet by mouth daily 30 tablet 0     [DISCONTINUED] benzonatate (TESSALON) 200 MG capsule Take 1 capsule (200 mg) by mouth 3 times daily as needed for cough 21 capsule 0     No facility-administered encounter medications on file as of 2020.      - Oriented to Practice, types of care, and how to reach a provider.  Pt prefers MD team.  - Patient received 1st trimester  new OB education packet complete with aide of [employer] booklet including information on genetic screening test options.  - Patient declines all genetic screening.  - Educational handout on the prevention of infections diseases during pregnancy provided.  - Patient was encouraged to start prenatal vitamins as tolerated.    - Patient was sent to lab for routine OB labs including pre-e baseline labs and A1C  Added due to increased risk with sarcoidosis.   - Reviewed recommendation for 17P, no increased risk.    - Reviewed risk for diabetes in pregnancy, pt agrees to early 1 hour GCT at NOB visit.   - Reviewed recommendation for low dose aspirin daily to prevent pre eclampsia and needs 1 hour GCT follow up at NOB visit.     Pt to RTO for NOB visit in 3 weeks and prn if questions or concerns    I, MERCEDES Harris am serving as a scribe; to document services personally performed by  ELMA Langston, JANES based on data collection and the provider's statements to me. -MERCEDES Harris      I agree with the PFSH and ROS as completed by the student, except for changes made by me. The remainder of the encounter was performed by me and scribed by the student. The scribed note accurately reflects my personal services and decisions made by me.  Allyson Ambriz, MISSY, JANES, APRN

## 2020-12-28 NOTE — LETTER
2020       RE: Juventino Dicekrson  [address]  [place] [facility] [place] [address]     Dear Colleague,    Thank you for referring your patient, Juventino Dickerson, to the [facility] [place] [facility]. Please see a copy of my visit note below.    [facility] OB Intake note  Subjective   30 year old female presents to clinic for initiation of OB care. Patient's last menstrual period was 10/23/2020.  at 9w3d by Estimated Date of Delivery: 2021 based on LMP and US confirms. Pregnancy is planned. 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 Pt is being followed by [facility]. Has history of PPHx2 and shoulder dystocia with 4518g baby at 41w4d.    Partner name - Deo.      Symptoms since LMP include nausea. Patient has tried these relief measures: vitamin B-6 and Unisom which helps.    OB History    Para Term  AB Living   4 2 2 0 1 2   SAB TAB Ectopic Multiple Live Births   1 0 0 0 2      # Outcome Date GA Lbr Paulino/2nd Weight Sex Delivery Anes PTL Lv   4 Current            3 Term 18 41w4d  4.518 kg (9 lb 15.4 oz) M Vag-Spont   ALLEGRA      Complications: Shoulder Dystocia, Hemorrhage   2 SAB 2013 6w0d          1 Term 12/15/11 39w3d   F Vag-Spont   ALLEGRA      Complications: Postpartum hemorrhage       - Genetic/Infection questionnaire completed, risks include none. Pt declines genetic testing. Pt  does not have a recent known exposure to Parvo or CMV so IgG/IgM testing WILL NOT be ordered.   Recommended Flu Vaccine.  Patient declined, do not offer  Have you traveled during the pregnancy? No  Have your sexual partner(s) travelled during the pregnancy? No       - Current Medications   Current Outpatient Medications   Medication Sig Dispense Refill     albuterol (PROAIR HFA/PROVENTIL HFA/VENTOLIN HFA) 108 (90 Base) MCG/ACT inhaler Inhale 2 puffs into the  lungs every 6 hours as needed for shortness of breath / dyspnea or wheezing 1 Inhaler 0     fluticasone (FLONASE) 50 MCG/ACT nasal spray Spray 1 spray into both nostrils daily 11.1 mL 0     predniSONE (DELTASONE) 5 MG tablet Take 2 tabs daily for 14 days for breathing 28 tablet 1     Prenatal Vit-Fe Fumarate-FA (PRENATAL MULTIVITAMIN W/IRON) 27-0.8 MG tablet Take 1 tablet by mouth daily 30 tablet 0     - Co-morbids   Past Medical History:   Diagnosis Date     Depressive disorder      History of shoulder dystocia in prior pregnancy      Postpartum depression      Pulmonary sarcoidosis (H)      - Risk for GDM : Pre pregnancy BMI>30 and First degree relative with GDM or DM   WILL have an early GCT and Hgb A1C    - High risk factors for Pre E- per MFM high risk for pre-e due to sarcoidosis.    - Moderate risk factor for Pre E Pre Pregnancy body mass index >30  and Sociodemographic characteristics (Racism, Less access given lower SES)  Meets one high risk factors or two or more of the moderate risk facrtors  so WILL consider starting low dose aspirin (81mg) starting between 12 and 28 weeks to prevent early onset preeclampsia    - The patient  does not have a history of spontaneous  birth so  WILL NOT consider progesterone starting at 16-20 weeks and/or serial transvaginal cervical length ultrasounds from 16-24 weeks.     -The patient does not have a history of immunosuppresion or HIV so Toxoplasma IgG/IgM WILL NOT be ordered.    PERSONAL/SOCIAL HISTORY  Partnered  Lives with children, not partner.  Employment: Unemployed.  History of anxiety or depression - Yes. Currently feels stable and well supported. Feels safe at home.  If Yes, therapy/medication/hospitalization - No.   Additional items: denies any physical, sexual or domestic abuse.    Objective  -VS: reviewed and within normal limits   -General appearance: no acute distress, patient is comfortable   NEUROLOGICAL/PSYCHIATRIC   - Orientated x3,   -Mood and  affect: normal     Note from [facility] on  recommendations:  Pulmonary Sarcoidosis    Close follow-up and monitoring with rheumatology.    Obtain baseline HELLP labs and urine protein:creatitine ratio. If UPC elevated, consider 24 hour urine protein.    Initiation of low dose aspirin between 12-16 weeks gestation for preeclampsia prophylaxis.    Consider opthalmologic examination as patient has not yet obtained.    Level II ultrasound to evaluate fetal anatomy.    Serial growth ultrasounds every 4-6 weeks to assess fetal growth.    Weekly  testing at 32 weeks with either NST plus MVP or BPP.      Routine    Patient planning to establish care with [facility].    Counseling regarding delivery given history of shoulder dystocia at the discretion of her primary OBGYN provider.    Declines aneuploidy testing.    Assessment/Plan  Juventino was seen today for prenatal care.    Diagnoses and all orders for this visit:    Body mass index (BMI) 34.0-34.9, adult     Supervision of other high risk pregnancies, first trimester    Hx of shoulder dystocia in prior pregnancy, currently pregnant    History of postpartum hemorrhage, currently pregnant      30 year old  9w3d weeks of pregnancy with MARGE of 2021 by LMP. Patient's last menstrual period was 10/23/2020.. Ultrasound confirms.   Outpatient Encounter Medications as of 2020   Medication Sig Dispense Refill     albuterol (PROAIR HFA/PROVENTIL HFA/VENTOLIN HFA) 108 (90 Base) MCG/ACT inhaler Inhale 2 puffs into the lungs every 6 hours as needed for shortness of breath / dyspnea or wheezing 1 Inhaler 0     fluticasone (FLONASE) 50 MCG/ACT nasal spray Spray 1 spray into both nostrils daily 11.1 mL 0     predniSONE (DELTASONE) 5 MG tablet Take 2 tabs daily for 14 days for breathing 28 tablet 1     Prenatal Vit-Fe Fumarate-FA (PRENATAL MULTIVITAMIN W/IRON) 27-0.8 MG tablet Take 1 tablet by mouth daily 30 tablet 0     [DISCONTINUED]  benzonatate (TESSALON) 200 MG capsule Take 1 capsule (200 mg) by mouth 3 times daily as needed for cough 21 capsule 0     No facility-administered encounter medications on file as of 12/28/2020.      - Oriented to Practice, types of care, and how to reach a provider.  Pt prefers MD team.  - Patient received 1st trimester new OB education packet complete with aide of [employer] booklet including information on genetic screening test options.  - Patient declines all genetic screening.  - Educational handout on the prevention of infections diseases during pregnancy provided.  - Patient was encouraged to start prenatal vitamins as tolerated.    - Patient was sent to lab for routine OB labs including pre-e baseline labs and A1C  Added due to increased risk with sarcoidosis.   - Reviewed recommendation for 17P, no increased risk.    - Reviewed risk for diabetes in pregnancy, pt agrees to early 1 hour GCT at NOB visit.   - Reviewed recommendation for low dose aspirin daily to prevent pre eclampsia and needs 1 hour GCT follow up at NOB visit.     Pt to RTO for NOB visit in 3 weeks and prn if questions or concerns    I, MERCEDES Harris am serving as a scribe; to document services personally performed by  ELMA Langston, JANES based on data collection and the provider's statements to me. -MERCEDES Harris      I agree with the PFSH and ROS as completed by the student, except for changes made by me. The remainder of the encounter was performed by me and scribed by the student. The scribed note accurately reflects my personal services and decisions made by me.  Allyson Ambriz, MISSY, JANES, ELMA

## 2021-01-11 ENCOUNTER — MYC MEDICAL ADVICE (OUTPATIENT)
Dept: RHEUMATOLOGY | Facility: CLINIC | Age: 31
End: 2021-01-11

## 2021-01-11 DIAGNOSIS — D86.0 SARCOIDOSIS, LUNG (H): ICD-10-CM

## 2021-01-11 DIAGNOSIS — J98.59 MEDIASTINAL MASS: ICD-10-CM

## 2021-01-11 NOTE — TELEPHONE ENCOUNTER
Left message for Juventino to return my call.  Will send her mychart message.     Shawnee Wall MSN, RN  Rheumatology RN Care Coordinator   [facility]

## 2021-01-12 NOTE — TELEPHONE ENCOUNTER
"Spoke with Juventino and she is reporting that she is not feeling any better after the course of Prednisone.    Her last dose of prednisone was on 12/31/2020 and she states that her symptoms returned on 1/4/2021. (She was taking Prednisone 10mg daily for fourteen days)    Juventino reports that she is experiencing shortness of breath and congestion.  She also states that she is experiencing a \"weird\" taste in her mouth.     A followup appointment was scheduled with Dr. Riggins on 1/29/2021 at 1130AM.    Shawnee Wall MSN, RN  Rheumatology RN Care Coordinator  [facility]      "

## 2021-01-13 NOTE — TELEPHONE ENCOUNTER
I am sorry that symptoms have recurred.  Possible reasons include new respiratory infection versus active underlying inflammatory condition.  I recommend repeating urinalysis, CRP, CBC with platelets, basic metabolic panel, and COVID-19 PCR test (last COVID-19 test was November 23).  If I understand the message correctly, patient did have some relief from pulmonary symptoms while taking prednisone, but the symptoms resurged after she discontinued the medication.  If that is correct, I recommend that she resume prednisone 10 mg daily for another 7 days, then off. Thank you.

## 2021-01-13 NOTE — TELEPHONE ENCOUNTER
Spoke to Juventino to provide the below message from Dr. Riggins and she already picked up the prednisone and will start taking it.  She will also go to get labs done as well.     Labs were placed in the chart and Juventino will go get them completed.     I am sorry that symptoms have recurred.  Possible reasons include new respiratory infection versus active underlying inflammatory condition.  I recommend repeating urinalysis, CRP, CBC with platelets, basic metabolic panel, and COVID-19 PCR test (last COVID-19 test was November 23).  If I understand the message correctly, patient did have some relief from pulmonary symptoms while taking prednisone, but the symptoms resurged after she discontinued the medication.  If that is correct, I recommend that she resume prednisone 10 mg daily for another 7 days, then off.    Shawnee Wall MSN, RN  Rheumatology RN Care Coordinator   [facility]

## 2021-01-28 NOTE — PROGRESS NOTES
Rheumatology Clinic Visit-remote  Juventino Dickerson MRN# [id number]   YOB: 1990 Age: 30 year old     Date of Visit: 01/29/2021  Primary care provider: No Ref-Primary, Physician          Assessment and Plan:     # Pulmonary sarcoidosis: Chronic cough, orthopnea, nighttime sweats have resolved, now off prednisone since 1-.  Cursory physical examination today shows unlabored breathing; phonation is nasal in character, but head and neck exam is grossly normal.  Latest laboratory evaluation on December 1, 2020 showed minimal elevation of CRP at 8.3; Sedimentation rate was mildly elevated at 41. vitamin D levels were 11; low.  CBC was normal, and urinalysis showed a trace of blood without red cells.    There were no current symptoms or signs that suggest active sarcoidosis.  I recommend retesting CRP, CBC, creatinine, and urinalysis.  Brief courses of low-dose (15 to 20 mg/day) prednisone may be used for resurgent pulmonary symptoms.  Upper respiratory congestion is isolated, and is likely due to rhinovirus or adenovirus infection.  I recommend air humidification, acetaminophen, and monitoring.  I would avoid pseudoephedrine or other decongestants in the context of pregnancy.  If symptoms continue or worsen, consider referral to ENT.    2. Pregnancy  EDC 7-2021: Progressing normally. Low dose prednisone during pregnancy may be used for inflammatory symptoms with reasonable safety.  However, prednisone should be used at the lowest effective dose for the shortest possible time.    Plan:  1  Follow with obstetrics as often as obstetrics recommends to guide healthy pregnancy.  2. UA, Cr, CBC    RTC 3 mos    Dimitri Riggins M.D.  Staff Rheumatologist,  Health  Pager [phone]            Active Problem List:     Patient Active Problem List    Diagnosis Date Noted     Supervision of other high risk pregnancies, first trimester 12/28/2020     Priority: Medium     ANASTASIYA TERAN pt -  Partner's name: Deo  [x]  Entered on Epic list  [X] NOB folder  [X] Dating  [X First tri screen - declined  [x ] QS/AFP ordered declined  [x] Fetal anatomy US ordered  [ ] Rubella immune  [ ] Hep B immune/nonimmune  [ ] Pap - due in 9/2021  [ ] Started ASA - need to start at 12 weeks   [ ] NO  plan utox in labor   _____________________________________  [ ] EOB folder  [ ] PP Contraception plan: If tubal,consent date:  [ ] Labor plans:  [ ] :  [ ] Infant feeding plan  [ ] FLU shot  [ ] TDAP given  [ ] Rhogam if needed, date:  [ ] TOLAC consent done  [ ] Waterbirth declines, consent done  [ ] GCT, passed  ________________________________________  [ ] GBS pos; neg  [ ] OTC PP meds sent  [ ] Planning CS-ERAS pkt         Hx of shoulder dystocia in prior pregnancy, currently pregnant 12/28/2020     Priority: Medium     9lb 15oz baby (2nd pregnancy)       History of postpartum hemorrhage, currently pregnant 12/28/2020     Priority: Medium     With both previous pregnancies.       Sarcoidosis 12/16/2020     Priority: Medium     Lymphadenopathy, mediastinal 11/24/2020     Priority: Medium     Mediastinal mass 11/23/2020     Priority: Medium            History of Present Illness:   Juventino Dickerson presents for follow up of cough and mediastinal mass.  She was last seen on December 18, 2020 when evaluation for pregnancy and ongoing pulmonary symptoms was continued.  Continued cough and nighttime shortness of breath was noted, as was normal evaluations of pulmonary and renal function.  A course of prednisone was recommended.    Patient was hospitalized 11-23 through 11- for evaluation of cough and mediastinal mass. Imaging (helical CT scan)  had shown mediastinal mass compressing the SVC anteriorly. She underwent EBUS/TBNA and BMBx on 11/24 during admission. Transbronchial biopsy showed granulomatous lymphadenitis. Further workup was begun to investigate infectious causes, rheumatologic disease, and lymphoma with ACE, urine calcium, histo,  "blasto, ESR, CRP. she was discharged .    Interval history 2021:    Patient was seen by provider Pb on 2020 for obstetrics intake.  Estimated date of delivery of 2021 was noted.  Higher risk of preeclampsia due to sarcoidosis was noted per maternal-fetal medicine.  Follow-up with urine protein to creatinine ratio, low-dose aspirin between 12 to 16 weeks of gestation, and serial growth ultrasounds every 4 to 6 weeks were recommended    She still has nasal congestion with frequent clear discharge since . No blood, purulence, itching. No muscle aches, fever, rash, joint pain.  Her cough is much better; energy level is good; pregnancy is progressing normally.  She has trouble breathing through the nose while she is eating.    Interval history 2020:    Patient saw Dr. Kramer in pulmonology on .  Assessment was of mediastinal lymph node positive for granulomas consistent with sarcoidosis.  Patient was referred to sarcoidosis specialist in Pulmonology, and to OB.  Increased risk of thromboembolic disease preeclampsia and  birth associated with sarcoid were discussed.    Patient was seen by Dr. Morales in infectious disease on .  Impression was of pulmonary granulomata, most likely sarcoid and less likely fungal disease.  Fungal antibodies were ordered.    She notes congestion in her nose and head, starting 2 days ago.     Breathing at night remains \"tough\", especially when she is lying down. No fevers. No chest pain. No skin rash or eye problems.    She breathes heavily and noisily constantly; she has low energy level.            Review of Systems:       Constitutional: Nocturnal sweats for 1 month.  Skin: negative  Eyes: negative. No dry eyes  Ears/Nose/Throat: negative.  Respiratory: shortness of breath, dry cough, worse at night time. Orthopnea  Cardiovascular: negative  Gastrointestinal: daily nausea/vomitting during " pregnancy  Genitourinary: negative  Musculoskeletal: R shoulder pain but no other joint pain present  Neurologic: negative  Psychiatric: negative  Hematologic/Lymphatic/Immunologic: negative  Endocrine: negative          Past Medical History:     Past Medical History:   Diagnosis Date     Depressive disorder      History of shoulder dystocia in prior pregnancy      Postpartum depression      Pulmonary sarcoidosis (H)      Past Surgical History:   Procedure Laterality Date     BRONCHOSCOPY RIDID OR FLEXIBLE W/ENDOBRONCHIAL ULTRASOUND GUIDED 3 OR MORE NODE STATIONS N/A 11/24/2020    Procedure: BRONCHOSCOPY, WITH BIOPSY OF 3 OR MORE LYMPH NODE STATIONS WITH ENDOBRONCHIAL ULTRASOUND GUIDANCE;  Surgeon: Kevin Kramer MD;  Location: [facility]     --pregnancy : LMP 10/22/2020 with EDC 7/29/2021         Social History:     Social History   Lives with 9 year old daughter and 2 year old son in [place]. Currently not working.    Occupational History     Not on file   Tobacco Use     Smoking status: 3ppd since early 20s. Quit one week ago.     Smokeless tobacco: Never Used   Substance and Sexual Activity     Alcohol use: Last drink one month ago     Drug use: None     Sexual activity: Not on file     Tobacco: 3 ppd X years, no smoking since   No recent use of inhaled substances other than tobacco, no exposures to rural areas, farhat, or recent out of state travel.       Family History:   Father - Diabetes           Allergies:   No Known Allergies         Medications:     Current Outpatient Medications   Medication Sig Dispense Refill     albuterol (PROAIR HFA/PROVENTIL HFA/VENTOLIN HFA) 108 (90 Base) MCG/ACT inhaler Inhale 2 puffs into the lungs every 6 hours as needed for shortness of breath / dyspnea or wheezing 1 Inhaler 0     fluticasone (FLONASE) 50 MCG/ACT nasal spray Spray 1 spray into both nostrils daily 11.1 mL 0     predniSONE (DELTASONE) 5 MG tablet Take 2 tabs daily for 14 days for breathing  28 tablet 1     Prenatal Vit-Fe Fumarate-FA (PRENATAL MULTIVITAMIN W/IRON) 27-0.8 MG tablet Take 1 tablet by mouth daily 30 tablet 0            Physical Exam:   Last menstrual period 10/23/2020, not currently breastfeeding.  Wt Readings from Last 4 Encounters:   12/28/20 81.6 kg (179 lb 14.4 oz)   12/01/20 76.3 kg (168 lb 4.8 oz)   11/25/20 75.3 kg (165 lb 14.4 oz)   11/19/20 77.1 kg (170 lb)       Constitutional: well-developed, NAD  Eyes: nl EOM, PERRLA, vision, conjunctiva, sclera  ENT: nl external ears, nose, hearing, lips, teeth, gums, throat; phonation is nasal  No mucous membrane lesions, normal saliva pool  Neck: no mass or thyroid enlargement  Resp: breathing unlabored  Skin: no nail pitting, alopecia, rash, nodules or lesions  Neuro: nl cranial nerves  Psych: nl judgement, orientation, memory, affect.         Data:     No results found for any visits on 01/29/21.  RHEUM RESULTS Latest Ref Rng & Units 11/24/2020 11/25/2020 12/1/2020   SED RATE 0 - 20 mm/h - 32(H) 41(H)   CRP, INFLAMMATION 0.0 - 8.0 mg/L - 14.0(H) 8.3(H)   AST 0 - 45 U/L - - -   ALT 0 - 50 U/L - - -   ALBUMIN 3.4 - 5.0 g/dL - - -   WBC 4.0 - 11.0 10e9/L 5.2 4.6 4.8   RBC 3.8 - 5.2 10e12/L 4.18 4.06 4.24   HGB 11.7 - 15.7 g/dL 11.9 11.5(L) 12.2   HCT 35.0 - 47.0 % 36.8 35.9 37.3   MCV 78 - 100 fl 88 88 88   MCHC 31.5 - 36.5 g/dL 32.3 32.0 32.7   RDW 10.0 - 15.0 % 14.7 14.5 14.4    - 450 10e9/L 418 382 395   CREATININE 0.52 - 1.04 mg/dL 0.69 0.68 -   GFR ESTIMATE, IF BLACK >60 mL/min/[1.73:m2] >90 >90 -   GFR ESTIMATE >60 mL/min/[1.73:m2] >90 >90 -        ,  ,  ,  ,  ,  ,  ,  ,  ,  ,  ,  ,  ,  ,   Hepatitis B Core Marci   Date Value Ref Range Status   11/23/2020 Nonreactive NR^Nonreactive Final   ,   Hep B Surface Agn   Date Value Ref Range Status   11/23/2020 Nonreactive NR^Nonreactive Final     Juventino is a 30 year old who is being evaluated via a billable video visit.      How would you like to obtain your AVS? MyChart    Will anyone  else be joining your video visit? No      Video Start Time: 11:36 AM  Video-Visit Details    Type of service:  Video Visit    Video End Time:12:01 PM    Originating Location (pt. Location): Home    Distant Location (provider location):  [facility] RHEUMATOLOGY CLINIC [place]     Platform used for Video Visit: Done.

## 2021-01-29 ENCOUNTER — VIRTUAL VISIT (OUTPATIENT)
Dept: RHEUMATOLOGY | Facility: CLINIC | Age: 31
End: 2021-01-29
Attending: INTERNAL MEDICINE
Payer: MEDICARE

## 2021-01-29 DIAGNOSIS — D86.0 SARCOIDOSIS, LUNG (H): Primary | ICD-10-CM

## 2021-01-29 PROCEDURE — 99213 OFFICE O/P EST LOW 20 MIN: CPT | Mod: 95 | Performed by: INTERNAL MEDICINE

## 2021-01-29 NOTE — LETTER
1/29/2021       RE: Juventino Dickerson  [address]  [facility] [address]     Dear Colleague,    Thank you for referring your patient, Juventino Dickerson, to the [facility] RHEUMATOLOG[facility]IN[facility] [place] [facility]. Please see a copy of my visit note below.      Rheumatology Clinic Visit-remote  Juventino Dickerson MRN# [id number]   YOB: 1990 Age: 30 year old     Date of Visit: 01/29/2021  Primary care provider: No Ref-Primary, Physician          Assessment and Plan:     # Pulmonary sarcoidosis: Chronic cough, orthopnea, nighttime sweats have resolved, now off prednisone since 1-.  Cursory physical examination today shows unlabored breathing; phonation is nasal in character, but head and neck exam is grossly normal.  Latest laboratory evaluation on December 1, 2020 showed minimal elevation of CRP at 8.3; Sedimentation rate was mildly elevated at 41. vitamin D levels were 11; low.  CBC was normal, and urinalysis showed a trace of blood without red cells.    There were no current symptoms or signs that suggest active sarcoidosis.  I recommend retesting CRP, CBC, creatinine, and urinalysis.  Brief courses of low-dose (15 to 20 mg/day) prednisone may be used for resurgent pulmonary symptoms.  Upper respiratory congestion is isolated, and is likely due to rhinovirus or adenovirus infection.  I recommend air humidification, acetaminophen, and monitoring.  I would avoid pseudoephedrine or other decongestants in the context of pregnancy.  If symptoms continue or worsen, consider referral to ENT.    2. Pregnancy  EDC 7-2021: Progressing normally. Low dose prednisone during pregnancy may be used for inflammatory symptoms with reasonable safety.  However, prednisone should be used at the lowest effective dose for the shortest possible time.    Plan:  1  Follow with obstetrics as often as obstetrics recommends to guide healthy pregnancy.  2. UA, Cr,  CBC    RTC 3 mos    Dimitri Riggins M.D.  Staff Rheumatologist,  Health  Pager [phone]            Active Problem List:     Patient Active Problem List    Diagnosis Date Noted     Supervision of other high risk pregnancies, first trimester 12/28/2020     Priority: Medium     [facility] MD pt -  Partner's name: Deo  [x] Entered on Epic list  [X] NOB folder  [X] Dating  [X First tri screen - declined  [x ] QS/AFP ordered declined  [x] Fetal anatomy US ordered  [ ] Rubella immune  [ ] Hep B immune/nonimmune  [ ] Pap - due in 9/2021  [ ] Started ASA - need to start at 12 weeks   [ ] NO  plan utox in labor   _____________________________________  [ ] EOB folder  [ ] PP Contraception plan: If tubal,consent date:  [ ] Labor plans:  [ ] :  [ ] Infant feeding plan  [ ] FLU shot  [ ] TDAP given  [ ] Rhogam if needed, date:  [ ] TOLAC consent done  [ ] Waterbirth declines, consent done  [ ] GCT, passed  ________________________________________  [ ] GBS pos; neg  [ ] OTC PP meds sent  [ ] Planning CS-ERAS pkt         Hx of shoulder dystocia in prior pregnancy, currently pregnant 12/28/2020     Priority: Medium     9lb 15oz baby (2nd pregnancy)       History of postpartum hemorrhage, currently pregnant 12/28/2020     Priority: Medium     With both previous pregnancies.       Sarcoidosis 12/16/2020     Priority: Medium     Lymphadenopathy, mediastinal 11/24/2020     Priority: Medium     Mediastinal mass 11/23/2020     Priority: Medium            History of Present Illness:   Juventino Dickerson presents for follow up of cough and mediastinal mass.  She was last seen on December 18, 2020 when evaluation for pregnancy and ongoing pulmonary symptoms was continued.  Continued cough and nighttime shortness of breath was noted, as was normal evaluations of pulmonary and renal function.  A course of prednisone was recommended.    Patient was hospitalized 11-23 through 11- for evaluation of cough and mediastinal mass. Imaging  "(helical CT scan)  had shown mediastinal mass compressing the SVC anteriorly. She underwent EBUS/TBNA and BMBx on  during admission. Transbronchial biopsy showed granulomatous lymphadenitis. Further workup was begun to investigate infectious causes, rheumatologic disease, and lymphoma with ACE, urine calcium, histo, blasto, ESR, CRP. she was discharged .    Interval history 2021:    Patient was seen by provider Pb on 2020 for obstetrics intake.  Estimated date of delivery of 2021 was noted.  Higher risk of preeclampsia due to sarcoidosis was noted per maternal-fetal medicine.  Follow-up with urine protein to creatinine ratio, low-dose aspirin between 12 to 16 weeks of gestation, and serial growth ultrasounds every 4 to 6 weeks were recommended    She still has nasal congestion with frequent clear discharge since . No blood, purulence, itching. No muscle aches, fever, rash, joint pain.  Her cough is much better; energy level is good; pregnancy is progressing normally.  She has trouble breathing through the nose while she is eating.    Interval history 2020:    Patient saw Dr. Kramer in pulmonology on .  Assessment was of mediastinal lymph node positive for granulomas consistent with sarcoidosis.  Patient was referred to sarcoidosis specialist in Pulmonology, and to OB.  Increased risk of thromboembolic disease preeclampsia and  birth associated with sarcoid were discussed.    Patient was seen by Dr. Morales in infectious disease on .  Impression was of pulmonary granulomata, most likely sarcoid and less likely fungal disease.  Fungal antibodies were ordered.    She notes congestion in her nose and head, starting 2 days ago.     Breathing at night remains \"tough\", especially when she is lying down. No fevers. No chest pain. No skin rash or eye problems.    She breathes heavily and noisily constantly; she has low energy level. "            Review of Systems:       Constitutional: Nocturnal sweats for 1 month.  Skin: negative  Eyes: negative. No dry eyes  Ears/Nose/Throat: negative.  Respiratory: shortness of breath, dry cough, worse at night time. Orthopnea  Cardiovascular: negative  Gastrointestinal: daily nausea/vomitting during pregnancy  Genitourinary: negative  Musculoskeletal: R shoulder pain but no other joint pain present  Neurologic: negative  Psychiatric: negative  Hematologic/Lymphatic/Immunologic: negative  Endocrine: negative          Past Medical History:     Past Medical History:   Diagnosis Date     Depressive disorder      History of shoulder dystocia in prior pregnancy      Postpartum depression      Pulmonary sarcoidosis (H)      Past Surgical History:   Procedure Laterality Date     BRONCHOSCOPY RIDID OR FLEXIBLE W/ENDOBRONCHIAL ULTRASOUND GUIDED 3 OR MORE NODE STATIONS N/A 11/24/2020    Procedure: BRONCHOSCOPY, WITH BIOPSY OF 3 OR MORE LYMPH NODE STATIONS WITH ENDOBRONCHIAL ULTRASOUND GUIDANCE;  Surgeon: Kevin Kramer MD;  Location: [facility]     --pregnancy : LMP 10/22/2020 with EDC 7/29/2021         Social History:     Social History   Lives with 9 year old daughter and 2 year old son in [place]. Currently not working.    Occupational History     Not on file   Tobacco Use     Smoking status: 3ppd since early 20s. Quit one week ago.     Smokeless tobacco: Never Used   Substance and Sexual Activity     Alcohol use: Last drink one month ago     Drug use: None     Sexual activity: Not on file     Tobacco: 3 ppd X years, no smoking since   No recent use of inhaled substances other than tobacco, no exposures to rural areas, farhat, or recent out of state travel.       Family History:   Father - Diabetes           Allergies:   No Known Allergies         Medications:     Current Outpatient Medications   Medication Sig Dispense Refill     albuterol (PROAIR HFA/PROVENTIL HFA/VENTOLIN HFA) 108 (90 Base)  MCG/ACT inhaler Inhale 2 puffs into the lungs every 6 hours as needed for shortness of breath / dyspnea or wheezing 1 Inhaler 0     fluticasone (FLONASE) 50 MCG/ACT nasal spray Spray 1 spray into both nostrils daily 11.1 mL 0     predniSONE (DELTASONE) 5 MG tablet Take 2 tabs daily for 14 days for breathing 28 tablet 1     Prenatal Vit-Fe Fumarate-FA (PRENATAL MULTIVITAMIN W/IRON) 27-0.8 MG tablet Take 1 tablet by mouth daily 30 tablet 0            Physical Exam:   Last menstrual period 10/23/2020, not currently breastfeeding.  Wt Readings from Last 4 Encounters:   12/28/20 81.6 kg (179 lb 14.4 oz)   12/01/20 76.3 kg (168 lb 4.8 oz)   11/25/20 75.3 kg (165 lb 14.4 oz)   11/19/20 77.1 kg (170 lb)       Constitutional: well-developed, NAD  Eyes: nl EOM, PERRLA, vision, conjunctiva, sclera  ENT: nl external ears, nose, hearing, lips, teeth, gums, throat; phonation is nasal  No mucous membrane lesions, normal saliva pool  Neck: no mass or thyroid enlargement  Resp: breathing unlabored  Skin: no nail pitting, alopecia, rash, nodules or lesions  Neuro: nl cranial nerves  Psych: nl judgement, orientation, memory, affect.         Data:     No results found for any visits on 01/29/21.  RHEUM RESULTS Latest Ref Rng & Units 11/24/2020 11/25/2020 12/1/2020   SED RATE 0 - 20 mm/h - 32(H) 41(H)   CRP, INFLAMMATION 0.0 - 8.0 mg/L - 14.0(H) 8.3(H)   AST 0 - 45 U/L - - -   ALT 0 - 50 U/L - - -   ALBUMIN 3.4 - 5.0 g/dL - - -   WBC 4.0 - 11.0 10e9/L 5.2 4.6 4.8   RBC 3.8 - 5.2 10e12/L 4.18 4.06 4.24   HGB 11.7 - 15.7 g/dL 11.9 11.5(L) 12.2   HCT 35.0 - 47.0 % 36.8 35.9 37.3   MCV 78 - 100 fl 88 88 88   MCHC 31.5 - 36.5 g/dL 32.3 32.0 32.7   RDW 10.0 - 15.0 % 14.7 14.5 14.4    - 450 10e9/L 418 382 395   CREATININE 0.52 - 1.04 mg/dL 0.69 0.68 -   GFR ESTIMATE, IF BLACK >60 mL/min/[1.73:m2] >90 >90 -   GFR ESTIMATE >60 mL/min/[1.73:m2] >90 >90 -        ,  ,  ,  ,  ,  ,  ,  ,  ,  ,  ,  ,  ,  ,   Hepatitis B Core Marci   Date Value  Ref Range Status   11/23/2020 Nonreactive NR^Nonreactive Final   ,   Hep B Surface Agn   Date Value Ref Range Status   11/23/2020 Nonreactive NR^Nonreactive Final     Juventino is a 30 year old who is being evaluated via a billable video visit.      How would you like to obtain your AVS? MyChart    Will anyone else be joining your video visit? No      Video Start Time: 11:36 AM  Video-Visit Details    Type of service:  Video Visit    Video End Time:12:01 PM    Originating Location (pt. Location): Home    Distant Location (provider location):  [facility] RHEUMATOLOGY CLINIC [place]     Platform used for Video Visit: SEJENT        Again, thank you for allowing me to participate in the care of your patient.      Sincerely,    Dimitri Riggins MD

## 2021-01-29 NOTE — PATIENT INSTRUCTIONS
Diagnosis:  1.  Pulmonary sarcoidosis: Resolution of cough and nighttime shortness of breath argues that pulmonary inflammation is now suppressed.  Prednisone is not indicated, but could be resumed if cough and shortness of breath return.  2.  Nose and face congestion: Most likely cause is mild viral respiratory infection.  I recommend breathing humidified air, and sparing use of pseudoephedrine, if pseudoephedrine is not contraindicated in pregnancy.  Consultation with ear nose and throat providers will be in order if symptoms worsen or persist.    Plan:  1.  Follow-up with obstetrics and with midwife as recommended.  2.  Check kidney function, creatinine, and urinalysis in the next month.

## 2021-02-12 ENCOUNTER — VIRTUAL VISIT (OUTPATIENT)
Dept: INFECTIOUS DISEASES | Facility: CLINIC | Age: 31
End: 2021-02-12
Attending: INTERNAL MEDICINE
Payer: MEDICARE

## 2021-02-12 DIAGNOSIS — R59.0 MEDIASTINAL LYMPHADENOPATHY: Primary | ICD-10-CM

## 2021-02-12 PROCEDURE — 99442 PR PHYSICIAN TELEPHONE EVALUATION 11-20 MIN: CPT | Mod: 95 | Performed by: INTERNAL MEDICINE

## 2021-02-12 NOTE — LETTER
"2/12/2021       RE: Juventino Dickerson  [address]  [facility] [address]     Dear Colleague,    Thank you for referring your patient, Juventino Dickerson, to the [facility] INFECTIOUS DISEASE CLINIC [place] at [facility]. Please see a copy of my visit note below.      Juventino is a 30 year old is being evaluated via a billable telephone visit.      What phone number would you like to be contacted at? [phone]  How would you like to obtain your AVS? MyChart  Phone call duration: 15 minutes    Patient was scheduled for a video visit, and agreed to the visit when the nurse called.  Then she forgot about it and did not log on.  She answered her telephone, and asked to switch to a telephone visit.    Reason for visit:   Infectious Disease Return Visit, previous history of mediastinal mass    SUBJECTIVE:    Juventino Dickerson is a 31 yo woman previously seen in Infectious Disease clinic 12/14/2020 as part of her work up for mediastinal and hilar lymphadenopathy.    Patient described SOB, cough, night sweats since 11/1/2020.   CT of the chest with the bilateral mediastinal and hilar lymphadenopathy. EBUS with FNA showed non necrotizing granulomas with negative GMS/ Brayden stain. Unfortunately no cxs were sent. Serum and urine histoplasma antigen and blood quantiferon gold plus are negative. Fungal antibodies by CF and ID were also negative.       Patient was diagnosed with sarcoidosis and received treatment with prednisone.  She is also pregnant and tells me that this is going \"fine.\"   Patient is not very engaged in this phone call, and does not understand why she has a visit with me.   She tells me that she is feeling \"fine,\" \"yes, better,\" and that the only thing bothering her is nasal congestion/rhinorrhea that feels like it has been going on for months.     I have reviewed and updated the patient's Past Medical History, Social History, Family History and " "Medication List.    OBJECTIVE:    Current Outpatient Medications   Medication     albuterol (PROAIR HFA/PROVENTIL HFA/VENTOLIN HFA) 108 (90 Base) MCG/ACT inhaler     fluticasone (FLONASE) 50 MCG/ACT nasal spray     Prenatal Vit-Fe Fumarate-FA (PRENATAL MULTIVITAMIN W/IRON) 27-0.8 MG tablet     predniSONE (DELTASONE) 5 MG tablet     No current facility-administered medications for this visit.        No Known Allergies      Examination via telephone visit:     GENERAL: Patient is alert and does not seem to be in any acute distress    RESPIRATORY:  No cough or labored breathing is noted.  PSYCH: Affect is bright. Speech fluent and appropriate.      The rest of a comprehensive physical examination is deferred due to the public health emergency telephone visit restrictions.      No new labs or imaging to review      ASSESSMENT and RECOMMENDATIONS:    Mediastinal and hilar lymphadenopathy with new diagnosis of Sarcoidosis.     Patient's work up was reassuring and ruled out infectious cause of lymphadenopathy.  Patient reports improvement with corticosteroid therapy.    She is complaining of ongoing nasal congestion/rhinorrhea that she describes as going on \"for months.\"   I recommended that she reach out to her Rheumatologist to see if they think this is related to her Sarcoidosis.      Infectious Disease will sign off the case.   Thank you for including our service in the care of this patient.        Bhargavi Lafleur MD, MS  Infectious Disease    Time:  I spent 15 total minutes on the telephone with the patient, including >50% of time in counseling.  I spent an additional 10 minutes on chart review, for a total of 25 minutes of my time for this visit.    "

## 2021-02-12 NOTE — PROGRESS NOTES
"  Juventino is a 30 year old is being evaluated via a billable telephone visit.      What phone number would you like to be contacted at? [phone]  How would you like to obtain your AVS? [place]  Phone call duration: 15 minutes    Patient was scheduled for a video visit, and agreed to the visit when the nurse called.  Then she forgot about it and did not log on.  She answered her telephone, and asked to switch to a telephone visit.    Reason for visit:   Infectious Disease Return Visit, previous history of mediastinal mass    SUBJECTIVE:    Juventino Dickerson is a 29 yo woman previously seen in Infectious Disease clinic 12/14/2020 as part of her work up for mediastinal and hilar lymphadenopathy.    Patient described SOB, cough, night sweats since 11/1/2020.   CT of the chest with the bilateral mediastinal and hilar lymphadenopathy. EBUS with FNA showed non necrotizing granulomas with negative GMS/ Brayden stain. Unfortunately no cxs were sent. Serum and urine histoplasma antigen and blood quantiferon gold plus are negative. Fungal antibodies by CF and ID were also negative.       Patient was diagnosed with sarcoidosis and received treatment with prednisone.  She is also pregnant and tells me that this is going \"fine.\"   Patient is not very engaged in this phone call, and does not understand why she has a visit with me.   She tells me that she is feeling \"fine,\" \"yes, better,\" and that the only thing bothering her is nasal congestion/rhinorrhea that feels like it has been going on for months.     I have reviewed and updated the patient's Past Medical History, Social History, Family History and Medication List.    OBJECTIVE:    Current Outpatient Medications   Medication     albuterol (PROAIR HFA/PROVENTIL HFA/VENTOLIN HFA) 108 (90 Base) MCG/ACT inhaler     fluticasone (FLONASE) 50 MCG/ACT nasal spray     Prenatal Vit-Fe Fumarate-FA (PRENATAL MULTIVITAMIN W/IRON) 27-0.8 MG tablet     predniSONE (DELTASONE) 5 MG tablet " "    No current facility-administered medications for this visit.        No Known Allergies      Examination via telephone visit:     GENERAL: Patient is alert and does not seem to be in any acute distress    RESPIRATORY:  No cough or labored breathing is noted.  PSYCH: Affect is bright. Speech fluent and appropriate.      The rest of a comprehensive physical examination is deferred due to the public health emergency telephone visit restrictions.      No new labs or imaging to review      ASSESSMENT and RECOMMENDATIONS:    Mediastinal and hilar lymphadenopathy with new diagnosis of Sarcoidosis.     Patient's work up was reassuring and ruled out infectious cause of lymphadenopathy.  Patient reports improvement with corticosteroid therapy.    She is complaining of ongoing nasal congestion/rhinorrhea that she describes as going on \"for months.\"   I recommended that she reach out to her Rheumatologist to see if they think this is related to her Sarcoidosis.      Infectious Disease will sign off the case.   Thank you for including our service in the care of this patient.        Bhargavi Lafleur MD, MS  Infectious Disease    Time:  I spent 15 total minutes on the telephone with the patient, including >50% of time in counseling.  I spent an additional 10 minutes on chart review, for a total of 25 minutes of my time for this visit.  "

## 2021-02-16 ENCOUNTER — TELEPHONE (OUTPATIENT)
Dept: RHEUMATOLOGY | Facility: CLINIC | Age: 31
End: 2021-02-16

## 2021-02-16 NOTE — TELEPHONE ENCOUNTER
[facility] Call Center    Phone Message    May a detailed message be left on voicemail: no     Reason for Call: Other: Patient called and said she was told to call rheumatology for her stuffy nose. She has previously had an appointment with Dr. Riggins. I moved her appointment up from April to March, but she would like to discuss her stuffy nose. She was very vague and just kept saying she was told by another provider to call us (she did not know what provider told her this)     Action Taken: Message routed to:  [facility] ([facility]): Rheum    Travel Screening: Not Applicable

## 2021-02-17 NOTE — TELEPHONE ENCOUNTER
Spoke to Juventino and provided the below information from Dr. Riggins; and the after visit summary from last office visit that has the recommendations was mailed to Juventino as well.    Thanks for the notes and concern. I did not find any supporting evidence for active sarcoidosis during recent virtual visit. If nasal congestion persists/worsens despite the symptomatic treatments I described, I recommend evaluation by primary care.    Shawnee Wall MSN, RN  Rheumatology RN Care Coordinator   [place]

## 2021-02-17 NOTE — TELEPHONE ENCOUNTER
Thanks for the notes and concern. I did not find any supporting evidence for active sarcoidosis during recent virtual visit. If nasal congestion persists/worsens despite the symptomatic treatments I described, I recommend evaluation by primary care.

## 2021-02-17 NOTE — TELEPHONE ENCOUNTER
"      I was the provider that recommended for the patient to reach out to Rheumatology for her ongoing nasal congestion/rhinorrhea.   She reports that is going on \"for months.\"  I thought it might be related to her Sarcoidosis, so I recommended that she reach out to your team.      Bhargavi Lafleur MD, MS  Infectious Disease  "

## 2021-03-05 ENCOUNTER — HOSPITAL ENCOUNTER (OUTPATIENT)
Dept: ULTRASOUND IMAGING | Facility: CLINIC | Age: 31
End: 2021-03-05
Attending: OBSTETRICS & GYNECOLOGY
Payer: MEDICARE

## 2021-03-05 ENCOUNTER — OFFICE VISIT (OUTPATIENT)
Dept: MATERNAL FETAL MEDICINE | Facility: CLINIC | Age: 31
End: 2021-03-05
Attending: OBSTETRICS & GYNECOLOGY
Payer: MEDICARE

## 2021-03-05 DIAGNOSIS — D86.9 SARCOIDOSIS: Primary | ICD-10-CM

## 2021-03-05 DIAGNOSIS — O09.90 HIGH-RISK PREGNANCY, UNSPECIFIED TRIMESTER: ICD-10-CM

## 2021-03-05 DIAGNOSIS — J98.59 MEDIASTINAL MASS: ICD-10-CM

## 2021-03-05 DIAGNOSIS — O35.9XX0 SUSPECTED FETAL ANOMALY, ANTEPARTUM, SINGLE OR UNSPECIFIED FETUS: ICD-10-CM

## 2021-03-05 PROCEDURE — 76811 OB US DETAILED SNGL FETUS: CPT

## 2021-03-05 PROCEDURE — 76811 OB US DETAILED SNGL FETUS: CPT | Mod: 26 | Performed by: OBSTETRICS & GYNECOLOGY

## 2021-03-06 NOTE — PROGRESS NOTES
Please see ultrasound report under Imaging tab for details of today's ultrasound.    Radha Redmond MD  Maternal-Fetal Medicine

## 2021-03-11 PROBLEM — D86.9 SARCOIDOSIS: Status: ACTIVE | Noted: 2020-12-16

## 2021-03-16 ENCOUNTER — TELEPHONE (OUTPATIENT)
Dept: OBGYN | Facility: CLINIC | Age: 31
End: 2021-03-16

## 2021-03-16 NOTE — TELEPHONE ENCOUNTER
Message received from patient requesting a phone call regarding rx for ASA.    Called and spoke with patient, she states that someone told her (couldn't verbalize who or when) that she needs to take ASA. No found documentation regarding recent discuss of ASA. She has appointment on 3/18. Advised she can discuss with CNM at appointment if it is determined she will be starting ASA. She verbalized understanding and agreement. Encouraged her to call back with any questions or concerns.

## 2021-03-17 DIAGNOSIS — O23.21: ICD-10-CM

## 2021-03-17 DIAGNOSIS — D86.0 SARCOIDOSIS, LUNG (H): ICD-10-CM

## 2021-03-17 DIAGNOSIS — R79.89 OTHER SPECIFIED ABNORMAL FINDINGS OF BLOOD CHEMISTRY: ICD-10-CM

## 2021-03-17 DIAGNOSIS — O09.891 SUPERVISION OF OTHER HIGH RISK PREGNANCIES, FIRST TRIMESTER: ICD-10-CM

## 2021-03-17 DIAGNOSIS — Z11.59 ENCOUNTER FOR SCREENING FOR OTHER VIRAL DISEASES: ICD-10-CM

## 2021-03-17 DIAGNOSIS — J98.59 MEDIASTINAL MASS: ICD-10-CM

## 2021-03-17 LAB
ABO + RH BLD: NORMAL
ABO + RH BLD: NORMAL
ALBUMIN UR-MCNC: NEGATIVE MG/DL
ALT SERPL W P-5'-P-CCNC: 13 U/L (ref 0–50)
ANION GAP SERPL CALCULATED.3IONS-SCNC: 6 MMOL/L (ref 3–14)
APPEARANCE UR: ABNORMAL
AST SERPL W P-5'-P-CCNC: 9 U/L (ref 0–45)
BASOPHILS # BLD AUTO: 0 10E9/L (ref 0–0.2)
BASOPHILS NFR BLD AUTO: 0.4 %
BILIRUB UR QL STRIP: NEGATIVE
BLD GP AB SCN SERPL QL: NORMAL
BLOOD BANK CMNT PATIENT-IMP: NORMAL
BUN SERPL-MCNC: 5 MG/DL (ref 7–30)
CALCIUM SERPL-MCNC: 8.9 MG/DL (ref 8.5–10.1)
CHLORIDE SERPL-SCNC: 107 MMOL/L (ref 94–109)
CO2 SERPL-SCNC: 22 MMOL/L (ref 20–32)
COLOR UR AUTO: YELLOW
CREAT SERPL-MCNC: 0.52 MG/DL (ref 0.52–1.04)
CREAT UR-MCNC: 89 MG/DL
CRP SERPL-MCNC: 12.9 MG/L (ref 0–8)
DIFFERENTIAL METHOD BLD: ABNORMAL
EOSINOPHIL # BLD AUTO: 0.2 10E9/L (ref 0–0.7)
EOSINOPHIL NFR BLD AUTO: 3.3 %
ERYTHROCYTE [DISTWIDTH] IN BLOOD BY AUTOMATED COUNT: 13.3 % (ref 10–15)
GFR SERPL CREATININE-BSD FRML MDRD: >90 ML/MIN/{1.73_M2}
GLUCOSE SERPL-MCNC: 108 MG/DL (ref 70–99)
GLUCOSE UR STRIP-MCNC: NEGATIVE MG/DL
HBA1C MFR BLD: 4.7 % (ref 0–5.6)
HCT VFR BLD AUTO: 31.9 % (ref 35–47)
HGB BLD-MCNC: 11 G/DL (ref 11.7–15.7)
HGB UR QL STRIP: NEGATIVE
IMM GRANULOCYTES # BLD: 0.1 10E9/L (ref 0–0.4)
IMM GRANULOCYTES NFR BLD: 1 %
KETONES UR STRIP-MCNC: NEGATIVE MG/DL
LEUKOCYTE ESTERASE UR QL STRIP: NEGATIVE
LYMPHOCYTES # BLD AUTO: 1.2 10E9/L (ref 0.8–5.3)
LYMPHOCYTES NFR BLD AUTO: 16.5 %
MCH RBC QN AUTO: 32.2 PG (ref 26.5–33)
MCHC RBC AUTO-ENTMCNC: 34.5 G/DL (ref 31.5–36.5)
MCV RBC AUTO: 93 FL (ref 78–100)
MONOCYTES # BLD AUTO: 0.5 10E9/L (ref 0–1.3)
MONOCYTES NFR BLD AUTO: 6.8 %
MUCOUS THREADS #/AREA URNS LPF: PRESENT /LPF
NEUTROPHILS # BLD AUTO: 5.2 10E9/L (ref 1.6–8.3)
NEUTROPHILS NFR BLD AUTO: 72 %
NITRATE UR QL: NEGATIVE
NRBC # BLD AUTO: 0 10*3/UL
NRBC BLD AUTO-RTO: 0 /100
PH UR STRIP: 6.5 PH (ref 5–7)
PLATELET # BLD AUTO: 306 10E9/L (ref 150–450)
POTASSIUM SERPL-SCNC: 3.4 MMOL/L (ref 3.4–5.3)
PROT UR-MCNC: 0.11 G/L
PROT/CREAT 24H UR: 0.13 G/G CR (ref 0–0.2)
RBC # BLD AUTO: 3.42 10E12/L (ref 3.8–5.2)
RBC #/AREA URNS AUTO: 1 /HPF (ref 0–2)
SODIUM SERPL-SCNC: 135 MMOL/L (ref 133–144)
SOURCE: ABNORMAL
SP GR UR STRIP: 1.02 (ref 1–1.03)
SPECIMEN EXP DATE BLD: NORMAL
SQUAMOUS #/AREA URNS AUTO: 22 /HPF (ref 0–1)
URATE SERPL-MCNC: 2.6 MG/DL (ref 2.6–6)
UROBILINOGEN UR STRIP-MCNC: NORMAL MG/DL (ref 0–2)
WBC # BLD AUTO: 7.2 10E9/L (ref 4–11)
WBC #/AREA URNS AUTO: 0 /HPF (ref 0–5)

## 2021-03-17 PROCEDURE — 86780 TREPONEMA PALLIDUM: CPT | Performed by: ADVANCED PRACTICE MIDWIFE

## 2021-03-17 PROCEDURE — 84156 ASSAY OF PROTEIN URINE: CPT | Performed by: INTERNAL MEDICINE

## 2021-03-17 PROCEDURE — 86850 RBC ANTIBODY SCREEN: CPT | Performed by: ADVANCED PRACTICE MIDWIFE

## 2021-03-17 PROCEDURE — 84460 ALANINE AMINO (ALT) (SGPT): CPT | Performed by: ADVANCED PRACTICE MIDWIFE

## 2021-03-17 PROCEDURE — 84450 TRANSFERASE (AST) (SGOT): CPT | Performed by: ADVANCED PRACTICE MIDWIFE

## 2021-03-17 PROCEDURE — 85025 COMPLETE CBC W/AUTO DIFF WBC: CPT | Performed by: ADVANCED PRACTICE MIDWIFE

## 2021-03-17 PROCEDURE — 84550 ASSAY OF BLOOD/URIC ACID: CPT | Performed by: ADVANCED PRACTICE MIDWIFE

## 2021-03-17 PROCEDURE — 87389 HIV-1 AG W/HIV-1&-2 AB AG IA: CPT | Performed by: ADVANCED PRACTICE MIDWIFE

## 2021-03-17 PROCEDURE — 87086 URINE CULTURE/COLONY COUNT: CPT | Performed by: ADVANCED PRACTICE MIDWIFE

## 2021-03-17 PROCEDURE — 83036 HEMOGLOBIN GLYCOSYLATED A1C: CPT | Performed by: ADVANCED PRACTICE MIDWIFE

## 2021-03-17 PROCEDURE — 82306 VITAMIN D 25 HYDROXY: CPT | Performed by: ADVANCED PRACTICE MIDWIFE

## 2021-03-17 PROCEDURE — 86762 RUBELLA ANTIBODY: CPT | Performed by: ADVANCED PRACTICE MIDWIFE

## 2021-03-17 PROCEDURE — 87340 HEPATITIS B SURFACE AG IA: CPT | Performed by: ADVANCED PRACTICE MIDWIFE

## 2021-03-17 PROCEDURE — 86901 BLOOD TYPING SEROLOGIC RH(D): CPT | Performed by: ADVANCED PRACTICE MIDWIFE

## 2021-03-17 PROCEDURE — 86140 C-REACTIVE PROTEIN: CPT | Performed by: ADVANCED PRACTICE MIDWIFE

## 2021-03-17 PROCEDURE — 81001 URINALYSIS AUTO W/SCOPE: CPT | Performed by: INTERNAL MEDICINE

## 2021-03-17 PROCEDURE — 86900 BLOOD TYPING SEROLOGIC ABO: CPT | Performed by: ADVANCED PRACTICE MIDWIFE

## 2021-03-17 PROCEDURE — 86803 HEPATITIS C AB TEST: CPT | Performed by: ADVANCED PRACTICE MIDWIFE

## 2021-03-17 PROCEDURE — 36415 COLL VENOUS BLD VENIPUNCTURE: CPT | Performed by: ADVANCED PRACTICE MIDWIFE

## 2021-03-17 PROCEDURE — 80048 BASIC METABOLIC PNL TOTAL CA: CPT | Performed by: ADVANCED PRACTICE MIDWIFE

## 2021-03-17 PROCEDURE — 86706 HEP B SURFACE ANTIBODY: CPT | Performed by: ADVANCED PRACTICE MIDWIFE

## 2021-03-18 DIAGNOSIS — O99.019 ANTEPARTUM ANEMIA: Primary | ICD-10-CM

## 2021-03-18 PROBLEM — O09.891 SUPERVISION OF OTHER HIGH RISK PREGNANCIES, FIRST TRIMESTER: Status: ACTIVE | Noted: 2020-12-28

## 2021-03-18 LAB
BACTERIA SPEC CULT: NORMAL
DEPRECATED CALCIDIOL+CALCIFEROL SERPL-MC: 25 UG/L (ref 20–75)
HBV SURFACE AB SERPL IA-ACNC: 89.81 M[IU]/ML
HBV SURFACE AG SERPL QL IA: NONREACTIVE
HCV AB SERPL QL IA: NONREACTIVE
HIV 1+2 AB+HIV1 P24 AG SERPL QL IA: NONREACTIVE
Lab: NORMAL
RUBV IGG SERPL IA-ACNC: 30 IU/ML
SPECIMEN SOURCE: NORMAL
T PALLIDUM AB SER QL: NONREACTIVE

## 2021-03-18 RX ORDER — MULTIVIT WITH MINERALS/LUTEIN
250 TABLET ORAL DAILY
Qty: 90 TABLET | Refills: 3 | Status: SHIPPED | OUTPATIENT
Start: 2021-03-18

## 2021-03-18 RX ORDER — FERROUS SULFATE 325(65) MG
325 TABLET ORAL
Qty: 90 TABLET | Refills: 3 | Status: ON HOLD | OUTPATIENT
Start: 2021-03-18 | End: 2021-07-23

## 2021-03-19 ENCOUNTER — VIRTUAL VISIT (OUTPATIENT)
Dept: RHEUMATOLOGY | Facility: CLINIC | Age: 31
End: 2021-03-19
Attending: INTERNAL MEDICINE
Payer: MEDICARE

## 2021-03-19 DIAGNOSIS — D86.0 SARCOIDOSIS, LUNG (H): Primary | ICD-10-CM

## 2021-03-19 ASSESSMENT — PAIN SCALES - GENERAL: PAINLEVEL: NO PAIN (0)

## 2021-03-19 NOTE — LETTER
"3/19/2021       RE: Juventino Dickerson  [address]  [facility] [address]     Dear Colleague,    Thank you for referring your patient, Juventino Dickerson, to the [facility] RHEUMATOLOGY CLINIC [place] at [facility]. Please see a copy of my visit note below.    CANCELLED APPT!!!!  NO ASSESSMENTS WERE MADE AND NO CARE WAS RENDERED.  NO CHARGE.  This incomplete note was placed in the chart in the course of pre-visit documentation for a scheduled clinic encounter that did not take place. The note CONTAINS OUTDATED ASSESSMENT AND DATA, and is only preserved to promote future clinical documentation.  Questions?  Dr. Dimitri Riggins, pager [phone].    I attempted to contact the patient by email and by text message through the epic associated video conferencing software.  The patient never appeared on the \"waiting room\" in Epic.  I also attempted to call the patient at mobile and home telephone numbers listed, twice.  I left a message on home phone voicemail for patient to call back to rheumatology to reschedule her appointment.    Rheumatology Clinic Visit-remote  Juventino Dickerson MRN# [id number]   YOB: 1990 Age: 30 year old     Date of Visit: 03/19/2021  Primary care provider: No Ref-Primary, Physician          Assessment and Plan:     # Pulmonary sarcoidosis:      Chronic cough, orthopnea, nighttime sweats have resolved, now off prednisone since 1-.  Cursory physical examination today shows unlabored breathing; phonation is nasal in character, but head and neck exam is grossly normal.      Laboratory evaluation on March 17, 2021 showed normal comprehensive metabolic panel; CRP stably and mildly elevated at 12.9, uric acid 2.6, CBC with hemoglobin of 11 urinalysis with no cells or protein treponema antibodies negative; hepatitis B immune; HCV and HIV negative.    There were no current symptoms or signs that suggest active sarcoidosis.  I " recommend retesting CRP, CBC, creatinine, and urinalysis.  Brief courses of low-dose (15 to 20 mg/day) prednisone may be used for resurgent pulmonary symptoms.  Upper respiratory congestion is isolated, and is likely due to rhinovirus or adenovirus infection.  I recommend air humidification, acetaminophen, and monitoring.  I would avoid pseudoephedrine or other decongestants in the context of pregnancy.  If symptoms continue or worsen, consider referral to ENT.    2. Pregnancy  EDC 7-2021: Progressing normally. Obstetrical ultrasound on March 5, 2021 revealed servin pregnancy.  Some structures could not be adequately visualized, follow-up ultrasound was recommended in 3 to 5 weeks.    Low dose prednisone during pregnancy may be used for inflammatory symptoms with reasonable safety.  However, prednisone should be used at the lowest effective dose for the shortest possible time.    Plan:  1  Follow with obstetrics as often as obstetrics recommends to guide healthy pregnancy.  2. UA, Cr, CBC    RTC 3 mos    Dimitri Riggins M.D.  Staff Rheumatologist,  Health  Pager [phone]            Active Problem List:     Patient Active Problem List    Diagnosis Date Noted     Supervision of other high risk pregnancies, first trimester 12/28/2020     Priority: Medium     [facility] MD pt -  Partner's name: Deo  [x] Entered on Epic list  [X] NOB folder  [X] Dating  [X First tri screen - declined  [x ] QS/AFP ordered declined  [x] Fetal anatomy US ordered  [X ] Rubella immune  [X ] Hep B immune  [ ] Pap - due in 9/2021  [ ] Started ASA - need to start at 12 weeks   [ ] NO  plan utox in labor   _____________________________________  [ ] EOB folder  [ ] PP Contraception plan: If tubal,consent date:  [ ] Labor plans:  [ ] :  [ ] Infant feeding plan  [ ] FLU shot  [ ] TDAP given  [ ] Rhogam if needed, date:  [ ] TOLAC consent done  [ ] Waterbirth declines, consent done  [ ] GCT, passed  ________________________________________  [  ] GBS pos; neg  [ ] OTC PP meds sent  [ ] Planning CS-ERAS pkt             Hx of shoulder dystocia in prior pregnancy, currently pregnant 2020     Priority: Medium     9lb 15oz baby (2nd pregnancy)       History of postpartum hemorrhage, currently pregnant 2020     Priority: Medium     With both previous pregnancies.       Sarcoidosis 2020     Priority: Medium     Pulmonary Sarcoidosis    Close follow-up and monitoring with rheumatology.    Obtain baseline HELLP labs and urine protein:creatitine ratio. If UPC elevated, consider 24 hour urine protein.    Initiation of low dose aspirin between 12-16 weeks gestation for preeclampsia prophylaxis.    Consider opthalmologic examination as patient has not yet obtained.    Level II ultrasound to evaluate fetal anatomy.    Serial growth ultrasounds every 4-6 weeks to assess fetal growth.    Weekly  testing at 32 weeks with either NST plus MVP or BPP.        Lymphadenopathy, mediastinal 2020     Priority: Medium     Mediastinal mass 2020     Priority: Medium     Shoulder dystocia during labor and delivery, delivered 2018     Priority: Medium     3 minutes 18.  Initial  XR clear       LGSIL of cervix of undetermined significance 2016     Priority: Medium     16 - LGSIL, [facility]  Geri De Souza, RT, 2016 9:42 AM - colpo please per Suha  1/3/17 - colpo with Dr Conklin - colpo not done, Discussed ASCCP recommendation for repeat pap with cytology at 12 months and she agrees to RETURN TO CLINIC in  per Dr. Conklin  Unable to tolerate Pap collection. Isa Burgess, ELMA,CNM, 2017 12:10 PM   2017 pt refuses pap, will get at pp visit _______  MARGE: 18 - patient to schedule PP and will do Pap at that time per notes of 18  Geri De Souza, RT, 7/10/2018 2:20 PM   18 - NILM.  Per Dr. Borden -Because she once had LGSIL, she needs cotesting in 1 year! Esteban  Amy, 10/18/2018 3:34 PM   10/11/19: HERMES, msg to Dr Santizo to add HPV    Geri De Souza, RT, 9/3/2019 1:59 PM Yes, can we please add HPV testing. I intended to order the pap with cotesting given her previous abnormalities. HPV added   Geri De Souza, RT, 10/28/2019 12:53 PM  HPV Negative   Geri De Souza, RT, 11/4/2019 3:07 PM    PAP HX  10/11/19: NILM  9/25/18 - NILM. Per Dr. Borden -Because she once had LGSIL, she needs cotesting in 1 year  11/23/16 - LGSI       Adjustment disorder with depressed mood 11/27/2012     Priority: Medium     Vitamin D deficiency 10/10/2012     Priority: Medium     3/18/2021 Vitamin D normalizing, continue supplement.       Learning difficulty 10/08/2009     Priority: Medium            History of Present Illness:   Juventino Dickerson presents for follow up of cough and mediastinal mass.  She was last seen 1-29=2021 when evaluation for pregnancy and ongoing pulmonary symptoms was continued.  Continued cough and nighttime shortness of breath was improved, and sarcoid was thought to be inactive.    Patient was hospitalized 11-23 through 11- for evaluation of cough and mediastinal mass. Imaging (helical CT scan)  had shown mediastinal mass compressing the SVC anteriorly. She underwent EBUS/TBNA and BMBx on 11/24 during admission. Transbronchial biopsy showed granulomatous lymphadenitis. Further workup was begun to investigate infectious causes, rheumatologic disease, and lymphoma with ACE, urine calcium, histo, blasto, ESR, CRP. she was discharged 11/25.    Interval history 03-:    Patient was last seen in infectious disease by Dr. Lafleur on February 12, 2021.  Impression was of mediastinal and hilar lymphadenopathy associated with sarcoidosis.  Improvement with prednisone was noted, as well as ongoing nasal congestion rhinorrhea.  No new treatments were recommended.        Interval history 01-:    Patient was seen by provider bP on December 28, 2020 for obstetrics  "intake.  Estimated date of delivery of 2021 was noted.  Higher risk of preeclampsia due to sarcoidosis was noted per maternal-fetal medicine.  Follow-up with urine protein to creatinine ratio, low-dose aspirin between 12 to 16 weeks of gestation, and serial growth ultrasounds every 4 to 6 weeks were recommended    She still has nasal congestion with frequent clear discharge since . No blood, purulence, itching. No muscle aches, fever, rash, joint pain.  Her cough is much better; energy level is good; pregnancy is progressing normally.  She has trouble breathing through the nose while she is eating.    Interval history 2020:    Patient saw Dr. Kramer in pulmonology on .  Assessment was of mediastinal lymph node positive for granulomas consistent with sarcoidosis.  Patient was referred to sarcoidosis specialist in Pulmonology, and to OB.  Increased risk of thromboembolic disease preeclampsia and  birth associated with sarcoid were discussed.    Patient was seen by Dr. Morales in infectious disease on .  Impression was of pulmonary granulomata, most likely sarcoid and less likely fungal disease.  Fungal antibodies were ordered.    She notes congestion in her nose and head, starting 2 days ago.     Breathing at night remains \"tough\", especially when she is lying down. No fevers. No chest pain. No skin rash or eye problems.    She breathes heavily and noisily constantly; she has low energy level.            Review of Systems:       Constitutional: Nocturnal sweats for 1 month.  Skin: negative  Eyes: negative. No dry eyes  Ears/Nose/Throat: negative.  Respiratory: shortness of breath, dry cough, worse at night time. Orthopnea  Cardiovascular: negative  Gastrointestinal: daily nausea/vomitting during pregnancy  Genitourinary: negative  Musculoskeletal: R shoulder pain but no other joint pain present  Neurologic: negative  Psychiatric: " negative  Hematologic/Lymphatic/Immunologic: negative  Endocrine: negative          Past Medical History:     Past Medical History:   Diagnosis Date     Depressive disorder      History of shoulder dystocia in prior pregnancy      Postpartum depression      Pulmonary sarcoidosis (H)      Past Surgical History:   Procedure Laterality Date     BRONCHOSCOPY RIDID OR FLEXIBLE W/ENDOBRONCHIAL ULTRASOUND GUIDED 3 OR MORE NODE STATIONS N/A 11/24/2020    Procedure: BRONCHOSCOPY, WITH BIOPSY OF 3 OR MORE LYMPH NODE STATIONS WITH ENDOBRONCHIAL ULTRASOUND GUIDANCE;  Surgeon: Kevin Kramer MD;  Location: [facility]     --pregnancy : LMP 10/22/2020 with EDC 7/29/2021         Social History:     Social History   Lives with 9 year old daughter and 2 year old son in [place]. Currently not working.    Occupational History     Not on file   Tobacco Use     Smoking status: 3ppd since early 20s. Quit one week ago.     Smokeless tobacco: Never Used   Substance and Sexual Activity     Alcohol use: Last drink one month ago     Drug use: None     Sexual activity: Not on file     Tobacco: 3 ppd X years, no smoking since   No recent use of inhaled substances other than tobacco, no exposures to rural areas, [place], or recent out of state travel.       Family History:   Father - Diabetes           Allergies:   No Known Allergies         Medications:     Current Outpatient Medications   Medication Sig Dispense Refill     albuterol (PROAIR HFA/PROVENTIL HFA/VENTOLIN HFA) 108 (90 Base) MCG/ACT inhaler Inhale 2 puffs into the lungs every 6 hours as needed for shortness of breath / dyspnea or wheezing 1 Inhaler 0     ferrous sulfate (FEROSUL) 325 (65 Fe) MG tablet Take 1 tablet (325 mg) by mouth daily (with breakfast) 90 tablet 3     fluticasone (FLONASE) 50 MCG/ACT nasal spray Spray 1 spray into both nostrils daily 11.1 mL 0     predniSONE (DELTASONE) 5 MG tablet Take 2 tabs daily for 14 days for breathing (Patient not  taking: Reported on 1/29/2021) 28 tablet 1     Prenatal Vit-Fe Fumarate-FA (PRENATAL MULTIVITAMIN W/IRON) 27-0.8 MG tablet Take 1 tablet by mouth daily 30 tablet 0     vitamin C (ASCORBIC ACID) 250 MG tablet Take 1 tablet (250 mg) by mouth daily 90 tablet 3            Physical Exam:   Last menstrual period 10/23/2020, not currently breastfeeding.  Wt Readings from Last 4 Encounters:   12/28/20 81.6 kg (179 lb 14.4 oz)   12/01/20 76.3 kg (168 lb 4.8 oz)   11/25/20 75.3 kg (165 lb 14.4 oz)   11/19/20 77.1 kg (170 lb)       Constitutional: well-developed, NAD  Eyes: nl EOM, PERRLA, vision, conjunctiva, sclera  ENT: nl external ears, nose, hearing, lips, teeth, gums, throat; phonation is nasal  No mucous membrane lesions, normal saliva pool  Neck: no mass or thyroid enlargement  Resp: breathing unlabored  Skin: no nail pitting, alopecia, rash, nodules or lesions  Neuro: nl cranial nerves  Psych: nl judgement, orientation, memory, affect.         Data:     No results found for any visits on 03/19/21.  RHEUM RESULTS Latest Ref Rng & Units 11/25/2020 12/1/2020 3/17/2021   SED RATE 0 - 20 mm/h 32(H) 41(H) -   CRP, INFLAMMATION 0.0 - 8.0 mg/L 14.0(H) 8.3(H) 12.9(H)   AST 0 - 45 U/L - - 9   ALT 0 - 50 U/L - - 13   ALBUMIN 3.4 - 5.0 g/dL - - -   WBC 4.0 - 11.0 10e9/L 4.6 4.8 7.2   RBC 3.8 - 5.2 10e12/L 4.06 4.24 3.42(L)   HGB 11.7 - 15.7 g/dL 11.5(L) 12.2 11.0(L)   HCT 35.0 - 47.0 % 35.9 37.3 31.9(L)   MCV 78 - 100 fl 88 88 93   MCHC 31.5 - 36.5 g/dL 32.0 32.7 34.5   RDW 10.0 - 15.0 % 14.5 14.4 13.3    - 450 10e9/L 382 395 306   CREATININE 0.52 - 1.04 mg/dL 0.68 - 0.52   GFR ESTIMATE, IF BLACK >60 mL/min/[1.73:m2] >90 - >90   GFR ESTIMATE >60 mL/min/[1.73:m2] >90 - >90   HEPATITIS C ANTIBODY NR:Nonreactive - - Nonreactive        ,  ,  ,  ,  ,  ,  ,  ,  ,  ,  ,  ,  ,  ,   Hepatitis B Core Marci   Date Value Ref Range Status   11/23/2020 Nonreactive NR^Nonreactive Final   ,   Hep B Surface Agn   Date Value Ref Range Status    11/23/2020 Nonreactive NR^Nonreactive Final       Again, thank you for allowing me to participate in the care of your patient.      Sincerely,    Dimitri Riggins MD

## 2021-03-19 NOTE — PROGRESS NOTES
"CANCELLED APPT!!!!  NO ASSESSMENTS WERE MADE AND NO CARE WAS RENDERED.  NO CHARGE.  This incomplete note was placed in the chart in the course of pre-visit documentation for a scheduled clinic encounter that did not take place. The note CONTAINS OUTDATED ASSESSMENT AND DATA, and is only preserved to promote future clinical documentation.  Questions?  Dr. Dimitri Riggins, pager [phone].    I attempted to contact the patient by email and by text message through the epic associated video conferencing software.  The patient never appeared on the \"waiting room\" in Epic.  I also attempted to call the patient at mobile and home telephone numbers listed, twice.  I left a message on home phone voicemail for patient to call back to rheumatology to reschedule her appointment.    Rheumatology Clinic Visit-remote  Juventino Dickerson MRN# [id number]   YOB: 1990 Age: 30 year old     Date of Visit: 03/19/2021  Primary care provider: No Ref-Primary, Physician          Assessment and Plan:     # Pulmonary sarcoidosis:      Chronic cough, orthopnea, nighttime sweats have resolved, now off prednisone since 1-.  Cursory physical examination today shows unlabored breathing; phonation is nasal in character, but head and neck exam is grossly normal.      Laboratory evaluation on March 17, 2021 showed normal comprehensive metabolic panel; CRP stably and mildly elevated at 12.9, uric acid 2.6, CBC with hemoglobin of 11 urinalysis with no cells or protein treponema antibodies negative; hepatitis B immune; HCV and HIV negative.    There were no current symptoms or signs that suggest active sarcoidosis.  I recommend retesting CRP, CBC, creatinine, and urinalysis.  Brief courses of low-dose (15 to 20 mg/day) prednisone may be used for resurgent pulmonary symptoms.  Upper respiratory congestion is isolated, and is likely due to rhinovirus or adenovirus infection.  I recommend air humidification, acetaminophen, and monitoring.  I " would avoid pseudoephedrine or other decongestants in the context of pregnancy.  If symptoms continue or worsen, consider referral to ENT.    2. Pregnancy  EDC 7-2021: Progressing normally. Obstetrical ultrasound on March 5, 2021 revealed servin pregnancy.  Some structures could not be adequately visualized, follow-up ultrasound was recommended in 3 to 5 weeks.    Low dose prednisone during pregnancy may be used for inflammatory symptoms with reasonable safety.  However, prednisone should be used at the lowest effective dose for the shortest possible time.    Plan:  1  Follow with obstetrics as often as obstetrics recommends to guide healthy pregnancy.  2. UA, Cr, CBC    RTC 3 mos    Dimitri Riggins M.D.  Staff Rheumatologist, [facility]  Pager [phone]            Active Problem List:     Patient Active Problem List    Diagnosis Date Noted     Supervision of other high risk pregnancies, first trimester 12/28/2020     Priority: Medium     [facility] MD pt -  Partner's name: Deo  [x] Entered on Epic list  [X] NOB folder  [X] Dating  [X First tri screen - declined  [x ] QS/AFP ordered declined  [x] Fetal anatomy US ordered  [X ] Rubella immune  [X ] Hep B immune  [ ] Pap - due in 9/2021  [ ] Started ASA - need to start at 12 weeks   [ ] NO  plan utox in labor   _____________________________________  [ ] EOB folder  [ ] PP Contraception plan: If tubal,consent date:  [ ] Labor plans:  [ ] :  [ ] Infant feeding plan  [ ] FLU shot  [ ] TDAP given  [ ] Rhogam if needed, date:  [ ] TOLAC consent done  [ ] Waterbirth declines, consent done  [ ] GCT, passed  ________________________________________  [ ] GBS pos; neg  [ ] OTC PP meds sent  [ ] Planning CS-ERAS pkt             Hx of shoulder dystocia in prior pregnancy, currently pregnant 12/28/2020     Priority: Medium     9lb 15oz baby (2nd pregnancy)       History of postpartum hemorrhage, currently pregnant 12/28/2020     Priority: Medium     With both previous  pregnancies.       Sarcoidosis 2020     Priority: Medium     Pulmonary Sarcoidosis    Close follow-up and monitoring with rheumatology.    Obtain baseline HELLP labs and urine protein:creatitine ratio. If UPC elevated, consider 24 hour urine protein.    Initiation of low dose aspirin between 12-16 weeks gestation for preeclampsia prophylaxis.    Consider opthalmologic examination as patient has not yet obtained.    Level II ultrasound to evaluate fetal anatomy.    Serial growth ultrasounds every 4-6 weeks to assess fetal growth.    Weekly  testing at 32 weeks with either NST plus MVP or BPP.        Lymphadenopathy, mediastinal 2020     Priority: Medium     Mediastinal mass 2020     Priority: Medium     Shoulder dystocia during labor and delivery, delivered 2018     Priority: Medium     3 minutes 18.  Initial  XR clear       LGSIL of cervix of undetermined significance 2016     Priority: Medium     16 - LGSIL, msg to Suha for plan  Geri De Souza RT, 2016 9:42 AM - colpo please per Suha  1/3/17 - colpo with Dr Conklin - colpo not done, Discussed ASCCP recommendation for repeat pap with cytology at 12 months and she agrees to RETURN TO CLINIC in  per Dr. Conklin  Unable to tolerate Pap collection. Isa Burgess, ELMA,CNM, 2017 12:10 PM   2017 pt refuses pap, will get at pp visit _______  MARGE: 18 - patient to schedule PP and will do Pap at that time per notes of 18  Geri De Souza RT, 7/10/2018 2:20 PM   18 - NILM.  Per Dr. Borden -Because she once had LGSIL, she needs cotesting in 1 year! Amy Orellana, 10/18/2018 3:34 PM   10/11/19: NILM, msg to Dr Santizo to add HPV    Geri De Souza RT, 9/3/2019 1:59 PM Yes, can we please add HPV testing. I intended to order the pap with cotesting given her previous abnormalities. HPV added   Geri De Souza RT, 10/28/2019 12:53 PM  HPV Negative   Geri De Souza RT,  11/4/2019 3:07 PM    PAP HX  10/11/19: NILM  9/25/18 - NILM. Per Dr. Borden -Because she once had LGSIL, she needs cotesting in 1 year  11/23/16 - LGSI       Adjustment disorder with depressed mood 11/27/2012     Priority: Medium     Vitamin D deficiency 10/10/2012     Priority: Medium     3/18/2021 Vitamin D normalizing, continue supplement.       Learning difficulty 10/08/2009     Priority: Medium            History of Present Illness:   Juventino Dickerson presents for follow up of cough and mediastinal mass.  She was last seen 1-29=2021 when evaluation for pregnancy and ongoing pulmonary symptoms was continued.  Continued cough and nighttime shortness of breath was improved, and sarcoid was thought to be inactive.    Patient was hospitalized 11-23 through 11- for evaluation of cough and mediastinal mass. Imaging (helical CT scan)  had shown mediastinal mass compressing the SVC anteriorly. She underwent EBUS/TBNA and BMBx on 11/24 during admission. Transbronchial biopsy showed granulomatous lymphadenitis. Further workup was begun to investigate infectious causes, rheumatologic disease, and lymphoma with ACE, urine calcium, histo, blasto, ESR, CRP. she was discharged 11/25.    Interval history 03-:    Patient was last seen in infectious disease by Dr. Lafleur on February 12, 2021.  Impression was of mediastinal and hilar lymphadenopathy associated with sarcoidosis.  Improvement with prednisone was noted, as well as ongoing nasal congestion rhinorrhea.  No new treatments were recommended.        Interval history 01-:    Patient was seen by provider Pb on December 28, 2020 for obstetrics intake.  Estimated date of delivery of July 30, 2021 was noted.  Higher risk of preeclampsia due to sarcoidosis was noted per maternal-fetal medicine.  Follow-up with urine protein to creatinine ratio, low-dose aspirin between 12 to 16 weeks of gestation, and serial growth ultrasounds every 4 to 6 weeks were  "recommended    She still has nasal congestion with frequent clear discharge since . No blood, purulence, itching. No muscle aches, fever, rash, joint pain.  Her cough is much better; energy level is good; pregnancy is progressing normally.  She has trouble breathing through the nose while she is eating.    Interval history 2020:    Patient saw Dr. Kramer in pulmonology on .  Assessment was of mediastinal lymph node positive for granulomas consistent with sarcoidosis.  Patient was referred to sarcoidosis specialist in Pulmonology, and to OB.  Increased risk of thromboembolic disease preeclampsia and  birth associated with sarcoid were discussed.    Patient was seen by Dr. Morales in infectious disease on .  Impression was of pulmonary granulomata, most likely sarcoid and less likely fungal disease.  Fungal antibodies were ordered.    She notes congestion in her nose and head, starting 2 days ago.     Breathing at night remains \"tough\", especially when she is lying down. No fevers. No chest pain. No skin rash or eye problems.    She breathes heavily and noisily constantly; she has low energy level.            Review of Systems:       Constitutional: Nocturnal sweats for 1 month.  Skin: negative  Eyes: negative. No dry eyes  Ears/Nose/Throat: negative.  Respiratory: shortness of breath, dry cough, worse at night time. Orthopnea  Cardiovascular: negative  Gastrointestinal: daily nausea/vomitting during pregnancy  Genitourinary: negative  Musculoskeletal: R shoulder pain but no other joint pain present  Neurologic: negative  Psychiatric: negative  Hematologic/Lymphatic/Immunologic: negative  Endocrine: negative          Past Medical History:     Past Medical History:   Diagnosis Date     Depressive disorder      History of shoulder dystocia in prior pregnancy      Postpartum depression      Pulmonary sarcoidosis (H)      Past Surgical History:   Procedure Laterality Date     " BRONCHOSCOPY RIDID OR FLEXIBLE W/ENDOBRONCHIAL ULTRASOUND GUIDED 3 OR MORE NODE STATIONS N/A 11/24/2020    Procedure: BRONCHOSCOPY, WITH BIOPSY OF 3 OR MORE LYMPH NODE STATIONS WITH ENDOBRONCHIAL ULTRASOUND GUIDANCE;  Surgeon: Kevin Kramer MD;  [facility]: [facility]     --pregnancy : LMP 10/22/2020 with EDC 7/29/2021         Social History:     Social History   Lives with 9 year old daughter and 2 year old son in [place]. Currently not working.    Occupational History     Not on file   Tobacco Use     Smoking status: 3ppd since early 20s. Quit one week ago.     Smokeless tobacco: Never Used   Substance and Sexual Activity     Alcohol use: Last drink one month ago     Drug use: None     Sexual activity: Not on file     Tobacco: 3 ppd X years, no smoking since   No recent use of inhaled substances other than tobacco, no exposures to rural areas, farhat, or recent out of state travel.       Family History:   Father - Diabetes           Allergies:   No Known Allergies         Medications:     Current Outpatient Medications   Medication Sig Dispense Refill     albuterol (PROAIR HFA/PROVENTIL HFA/VENTOLIN HFA) 108 (90 Base) MCG/ACT inhaler Inhale 2 puffs into the lungs every 6 hours as needed for shortness of breath / dyspnea or wheezing 1 Inhaler 0     ferrous sulfate (FEROSUL) 325 (65 Fe) MG tablet Take 1 tablet (325 mg) by mouth daily (with breakfast) 90 tablet 3     fluticasone (FLONASE) 50 MCG/ACT nasal spray Spray 1 spray into both nostrils daily 11.1 mL 0     predniSONE (DELTASONE) 5 MG tablet Take 2 tabs daily for 14 days for breathing (Patient not taking: Reported on 1/29/2021) 28 tablet 1     Prenatal Vit-Fe Fumarate-FA (PRENATAL MULTIVITAMIN W/IRON) 27-0.8 MG tablet Take 1 tablet by mouth daily 30 tablet 0     vitamin C (ASCORBIC ACID) 250 MG tablet Take 1 tablet (250 mg) by mouth daily 90 tablet 3            Physical Exam:   Last menstrual period 10/23/2020, not currently  breastfeeding.  Wt Readings from Last 4 Encounters:   12/28/20 81.6 kg (179 lb 14.4 oz)   12/01/20 76.3 kg (168 lb 4.8 oz)   11/25/20 75.3 kg (165 lb 14.4 oz)   11/19/20 77.1 kg (170 lb)       Constitutional: well-developed, NAD  Eyes: nl EOM, PERRLA, vision, conjunctiva, sclera  ENT: nl external ears, nose, hearing, lips, teeth, gums, throat; phonation is nasal  No mucous membrane lesions, normal saliva pool  Neck: no mass or thyroid enlargement  Resp: breathing unlabored  Skin: no nail pitting, alopecia, rash, nodules or lesions  Neuro: nl cranial nerves  Psych: nl judgement, orientation, memory, affect.         Data:     No results found for any visits on 03/19/21.  RHEUM RESULTS Latest Ref Rng & Units 11/25/2020 12/1/2020 3/17/2021   SED RATE 0 - 20 mm/h 32(H) 41(H) -   CRP, INFLAMMATION 0.0 - 8.0 mg/L 14.0(H) 8.3(H) 12.9(H)   AST 0 - 45 U/L - - 9   ALT 0 - 50 U/L - - 13   ALBUMIN 3.4 - 5.0 g/dL - - -   WBC 4.0 - 11.0 10e9/L 4.6 4.8 7.2   RBC 3.8 - 5.2 10e12/L 4.06 4.24 3.42(L)   HGB 11.7 - 15.7 g/dL 11.5(L) 12.2 11.0(L)   HCT 35.0 - 47.0 % 35.9 37.3 31.9(L)   MCV 78 - 100 fl 88 88 93   MCHC 31.5 - 36.5 g/dL 32.0 32.7 34.5   RDW 10.0 - 15.0 % 14.5 14.4 13.3    - 450 10e9/L 382 395 306   CREATININE 0.52 - 1.04 mg/dL 0.68 - 0.52   GFR ESTIMATE, IF BLACK >60 mL/min/[1.73:m2] >90 - >90   GFR ESTIMATE >60 mL/min/[1.73:m2] >90 - >90   HEPATITIS C ANTIBODY NR:Nonreactive - - Nonreactive        ,  ,  ,  ,  ,  ,  ,  ,  ,  ,  ,  ,  ,  ,   Hepatitis B Core Marci   Date Value Ref Range Status   11/23/2020 Nonreactive NR^Nonreactive Final   ,   Hep B Surface Agn   Date Value Ref Range Status   11/23/2020 Nonreactive NR^Nonreactive Final

## 2021-03-30 ENCOUNTER — OFFICE VISIT (OUTPATIENT)
Dept: OBGYN | Facility: CLINIC | Age: 31
End: 2021-03-30
Payer: MEDICARE

## 2021-03-30 VITALS
DIASTOLIC BLOOD PRESSURE: 74 MMHG | WEIGHT: 196.7 LBS | BODY MASS INDEX: 37.14 KG/M2 | SYSTOLIC BLOOD PRESSURE: 112 MMHG | HEIGHT: 61 IN | HEART RATE: 87 BPM

## 2021-03-30 DIAGNOSIS — F43.21 ADJUSTMENT DISORDER WITH DEPRESSED MOOD: ICD-10-CM

## 2021-03-30 DIAGNOSIS — O09.891 SUPERVISION OF OTHER HIGH RISK PREGNANCIES, FIRST TRIMESTER: Primary | ICD-10-CM

## 2021-03-30 DIAGNOSIS — O09.299 HISTORY OF POSTPARTUM HEMORRHAGE, CURRENTLY PREGNANT: ICD-10-CM

## 2021-03-30 DIAGNOSIS — O09.299 HX OF SHOULDER DYSTOCIA IN PRIOR PREGNANCY, CURRENTLY PREGNANT: ICD-10-CM

## 2021-03-30 DIAGNOSIS — D86.9 SARCOIDOSIS: ICD-10-CM

## 2021-03-30 DIAGNOSIS — F81.9 LEARNING DIFFICULTY: ICD-10-CM

## 2021-03-30 DIAGNOSIS — E55.9 VITAMIN D DEFICIENCY: ICD-10-CM

## 2021-03-30 PROCEDURE — 99207 PR PRENATAL VISIT: CPT | Performed by: MIDWIFE

## 2021-03-30 PROCEDURE — 87591 N.GONORRHOEAE DNA AMP PROB: CPT | Mod: GZ | Performed by: MIDWIFE

## 2021-03-30 PROCEDURE — G0463 HOSPITAL OUTPT CLINIC VISIT: HCPCS

## 2021-03-30 PROCEDURE — 87491 CHLMYD TRACH DNA AMP PROBE: CPT | Performed by: MIDWIFE

## 2021-03-30 RX ORDER — ASPIRIN 81 MG/1
81 TABLET, CHEWABLE ORAL DAILY
Qty: 90 TABLET | Refills: 2 | Status: ON HOLD | OUTPATIENT
Start: 2021-03-30 | End: 2021-07-25

## 2021-03-30 ASSESSMENT — ANXIETY QUESTIONNAIRES
1. FEELING NERVOUS, ANXIOUS, OR ON EDGE: NOT AT ALL
7. FEELING AFRAID AS IF SOMETHING AWFUL MIGHT HAPPEN: NOT AT ALL
5. BEING SO RESTLESS THAT IT IS HARD TO SIT STILL: NOT AT ALL
6. BECOMING EASILY ANNOYED OR IRRITABLE: NOT AT ALL
3. WORRYING TOO MUCH ABOUT DIFFERENT THINGS: NOT AT ALL
2. NOT BEING ABLE TO STOP OR CONTROL WORRYING: NOT AT ALL
GAD7 TOTAL SCORE: 0

## 2021-03-30 ASSESSMENT — PATIENT HEALTH QUESTIONNAIRE - PHQ9: 5. POOR APPETITE OR OVEREATING: NOT AT ALL

## 2021-03-30 ASSESSMENT — MIFFLIN-ST. JEOR: SCORE: 1554.61

## 2021-03-30 NOTE — LETTER
April 7, 2021    To Whom It May Concern:    Juventino Dickerson is being seen in our clinic for prenatal care. She has been experiencing difficulty with daily activities during her pregnancy and would benefit from an increase in hours of her personal care assistant.     Her Estimated Date of Delivery: Jul 30, 2021.        Sincerely,        ELMA Lamb, JANES

## 2021-03-30 NOTE — PROGRESS NOTES
SUBJECTIVE:   Juventino is a 30 year old female who presents to clinic for a new OB visit.   at 22w4d with Estimated Date of Delivery: 2021 based on LMP. Feels well. Has started PNV.     She has not had bleeding since her LMP.   She has not had nausea. Weight loss has not occurred.   Partner is involved,  Deo.    OTHER CONCERNS:     - Is currently taking vitamin D and iron.      Pt's mother was called to discuss plan of care. Pt's mother requests letter asking for increase in PCA hours. She reports Juventino is having difficulty getting dressed due to feet swelling and remembering to take her vitamins.     ===========================================  ROS: 10 point ROS neg other than the symptoms noted above in the HPI.    PSYCHIATRIC:  Denies mood changes, depression or anxiety.  Has History of depression. Did therapy in past, but not seeing her regularly now. Has easy access to schedule if desired.     PHQ-9 score:  No flowsheet data found.    MARK-7 SCORE 3/30/2021   Total Score 0       Past History:  Her past medical history   Past Medical History:   Diagnosis Date     Depressive disorder      History of shoulder dystocia in prior pregnancy      Postpartum depression      Pulmonary sarcoidosis (H)    .   Her past pregnancies have been uncomplicated  Since her last LMP she denies use of alcohol, tobacco and street drugs.  HISTORY:  Family History   Problem Relation Age of Onset     Diabetes Father      Social History     Socioeconomic History     Marital status: Single     Spouse name: Not on file     Number of children: Not on file     Years of education: Not on file     Highest education level: Not on file   Occupational History     Occupation: Unemployed   Social Needs     Financial resource strain: Not on file     Food insecurity     Worry: Not on file     Inability: Not on file     Transportation needs     Medical: Not on file     Non-medical: Not on file   Tobacco Use     Smoking status: Never  Smoker     Smokeless tobacco: Never Used   Substance and Sexual Activity     Alcohol use: Not Currently     Drug use: Not Currently     Sexual activity: Yes     Partners: Male   Lifestyle     Physical activity     Days per week: Not on file     Minutes per session: Not on file     Stress: Not on file   Relationships     Social connections     Talks on phone: Not on file     Gets together: Not on file     Attends Latter day service: Not on file     Active member of club or organization: Not on file     Attends meetings of clubs or organizations: Not on file     Relationship status: Not on file     Intimate partner violence     Fear of current or ex partner: Not on file     Emotionally abused: Not on file     Physically abused: Not on file     Forced sexual activity: Not on file   Other Topics Concern     Parent/sibling w/ CABG, MI or angioplasty before 65F 55M? Not Asked   Social History Narrative     Not on file     Current Outpatient Medications   Medication Sig     albuterol (PROAIR HFA/PROVENTIL HFA/VENTOLIN HFA) 108 (90 Base) MCG/ACT inhaler Inhale 2 puffs into the lungs every 6 hours as needed for shortness of breath / dyspnea or wheezing     aspirin (ASA) 81 MG chewable tablet Take 1 tablet (81 mg) by mouth daily     ferrous sulfate (FEROSUL) 325 (65 Fe) MG tablet Take 1 tablet (325 mg) by mouth daily (with breakfast)     Prenatal Vit-Fe Fumarate-FA (PRENATAL MULTIVITAMIN W/IRON) 27-0.8 MG tablet Take 1 tablet by mouth daily     vitamin C (ASCORBIC ACID) 250 MG tablet Take 1 tablet (250 mg) by mouth daily     fluticasone (FLONASE) 50 MCG/ACT nasal spray Spray 1 spray into both nostrils daily (Patient not taking: Reported on 3/19/2021)     predniSONE (DELTASONE) 5 MG tablet Take 2 tabs daily for 14 days for breathing (Patient not taking: Reported on 1/29/2021)     No current facility-administered medications for this visit.      No Known Allergies    ============================================  MEDICAL  "HISTORY  Past Medical History:   Diagnosis Date     Depressive disorder      History of shoulder dystocia in prior pregnancy      Postpartum depression      Pulmonary sarcoidosis (H)      Past Surgical History:   Procedure Laterality Date     BRONCHOSCOPY RIDID OR FLEXIBLE W/ENDOBRONCHIAL ULTRASOUND GUIDED 3 OR MORE NODE STATIONS N/A 2020    Procedure: BRONCHOSCOPY, WITH BIOPSY OF 3 OR MORE LYMPH NODE STATIONS WITH ENDOBRONCHIAL ULTRASOUND GUIDANCE;  Surgeon: Kevin Kramer MD;  Location: [facility]       OB History    Para Term  AB Living   4 2 2 0 1 2   SAB TAB Ectopic Multiple Live Births   1 0 0 0 2      # Outcome Date GA Lbr Paulino/2nd Weight Sex Delivery Anes PTL Lv   4 Current            3 Term 18 41w4d  4.518 kg (9 lb 15.4 oz) M Vag-Spont   ALLEGRA      Complications: Shoulder Dystocia, Hemorrhage   2 SAB 2013 6w0d          1 Term 12/15/11 39w3d   F Vag-Spont   ALLEGRA      Complications: Postpartum hemorrhage     GYN History- Abnormal Pap Smears in 2016- last was NILM  and                         Cervical procedures: unsure if she had a colpo                        History of STI: none    I personally reviewed the past social/family/medical and surgical history on the date of service.   I reviewed lab work done at Intake visit with patient.    EXAM:  /74 (BP Location: Left arm, Patient Position: Chair)   Pulse 87   Ht 1.549 m (5' 1\")   Wt 89.2 kg (196 lb 11.2 oz)   LMP 10/23/2020   BMI 37.17 kg/m     EXAM:  GENERAL:  Pleasant pregnant female, alert, cooperative and well groomed.  SKIN:  Warm and dry, without lesions or rashes  HEAD: Symmetrical features.  MOUTH: not visualized   NECK:  Thyroid without enlargement and nodules.  Lymph nodes not palpable.   LUNGS:  Clear to auscultation.  BREAST:    No dominant, fixed or suspicious masses are noted.  No skin or nipple changes or axillary nodes.   Nipples everted.      HEART:  RRR without murmur.  ABDOMEN: Soft without " masses , tenderness or organomegaly.  No CVA tenderness.  Uterus palpable at size equal to dates.  No scars noted. Fetal heart tones present.  MUSCULOSKELETAL:  Full range of motion  EXTREMITIES:  No edema. No significant varicosities.   PELVIC EXAM: deferred   GC/CHLAMYDIA CULTURE OBTAINED:YES- urine collected     Recommended Flu Vaccine.  Patient declined, will consider next visit    ASSESSMENT:  30 year old , 22w4d weeks of pregnancy with MARGE of 2021 by LMP  Intrauterine pregnancy 22w4d size consistent with dates  Genetic Screening: Level 2 Ultrasound only    ICD-10-CM    1. Supervision of other high risk pregnancies, first trimester  O09.891 Chlamydia trachomatis PCR     Neisseria gonorrhoeae PCR     aspirin (ASA) 81 MG chewable tablet   2. Hx of shoulder dystocia in prior pregnancy, currently pregnant  O09.299    3. History of postpartum hemorrhage, currently pregnant  O09.299    4. Sarcoidosis  D86.9    5. Learning difficulty  F81.9    6. Vitamin D deficiency  E55.9    7. Adjustment disorder with depressed mood  F43.21        PLAN:  - Reviewed use of triage nurse line and contacting the on-call provider after hours for an urgent need such as fever, vagina bleeding, bladder or vaginal infection, rupture of membranes,  or term labor.    - Reviewed best evidence for: weight gain for her weight and height for pregnancy:  Based on pre-pregnancy Body mass index is 37.17 kg/m . RECOMMENDED WEIGHT GAIN: < 15 lbs.  - Reviewed healthy diet and foods to avoid, exercise and activity during pregnancy; avoiding exposure to toxoplasmosis; and maintenance of a generally healthy lifestyle.   - Discussed the harms, benefits, side effects and alternative therapies for current prescribed and OTC medications.  - Reviewed high risk for gestational diabetes, patient had gap in care so did not do early GCT. Pt is agreeable to GCT at next clinic visit.   - Reviewed risk for Pre Eclampsia due to sarcoidosis, Pre  Pregnancy body mass index >30  and Sociodemographic characteristics (Racism, Less access given lower SES) and ISagreeable to starting 81mg aspirin daily. Rx sent.   - All pt's and mother's questions discussed and answered.  Pt verbalized understanding of and agreement to plan of care.   - Continue scheduled prenatal care and prn if questions or concerns    ELMA Lamb CNM

## 2021-03-31 LAB
C TRACH DNA SPEC QL NAA+PROBE: NEGATIVE
N GONORRHOEA DNA SPEC QL NAA+PROBE: NEGATIVE
SPECIMEN SOURCE: NORMAL
SPECIMEN SOURCE: NORMAL

## 2021-03-31 ASSESSMENT — ANXIETY QUESTIONNAIRES: GAD7 TOTAL SCORE: 0

## 2021-04-02 ENCOUNTER — OFFICE VISIT (OUTPATIENT)
Dept: MATERNAL FETAL MEDICINE | Facility: CLINIC | Age: 31
End: 2021-04-02
Attending: OBSTETRICS & GYNECOLOGY
Payer: MEDICARE

## 2021-04-02 ENCOUNTER — HOSPITAL ENCOUNTER (OUTPATIENT)
Dept: ULTRASOUND IMAGING | Facility: CLINIC | Age: 31
End: 2021-04-02
Attending: OBSTETRICS & GYNECOLOGY
Payer: MEDICARE

## 2021-04-02 VITALS — SYSTOLIC BLOOD PRESSURE: 121 MMHG | DIASTOLIC BLOOD PRESSURE: 63 MMHG | HEART RATE: 93 BPM

## 2021-04-02 DIAGNOSIS — O35.9XX0 SUSPECTED FETAL ANOMALY, ANTEPARTUM, SINGLE OR UNSPECIFIED FETUS: Primary | ICD-10-CM

## 2021-04-02 DIAGNOSIS — O35.9XX0 SUSPECTED FETAL ANOMALY, ANTEPARTUM, SINGLE OR UNSPECIFIED FETUS: ICD-10-CM

## 2021-04-02 DIAGNOSIS — D86.9 SARCOIDOSIS: ICD-10-CM

## 2021-04-02 PROCEDURE — 76816 OB US FOLLOW-UP PER FETUS: CPT | Mod: 26 | Performed by: OBSTETRICS & GYNECOLOGY

## 2021-04-02 PROCEDURE — 76816 OB US FOLLOW-UP PER FETUS: CPT

## 2021-04-02 NOTE — PROGRESS NOTES
The patient was seen for an ultrasound in the Maternal-Fetal Medicine Center at the [facility] [place] today.  For a detailed report of the ultrasound examination, please see the ultrasound report which can be found under the imaging tab.    Bridget Moran MD  , OB/GYN  Maternal-Fetal Medicine  [phone] (Pager)

## 2021-04-08 ENCOUNTER — TELEPHONE (OUTPATIENT)
Dept: OBGYN | Facility: CLINIC | Age: 31
End: 2021-04-08

## 2021-05-04 ENCOUNTER — HOSPITAL ENCOUNTER (OUTPATIENT)
Dept: ULTRASOUND IMAGING | Facility: CLINIC | Age: 31
End: 2021-05-04
Attending: OBSTETRICS & GYNECOLOGY
Payer: MEDICARE

## 2021-05-04 ENCOUNTER — OFFICE VISIT (OUTPATIENT)
Dept: MATERNAL FETAL MEDICINE | Facility: CLINIC | Age: 31
End: 2021-05-04
Attending: OBSTETRICS & GYNECOLOGY
Payer: MEDICARE

## 2021-05-04 DIAGNOSIS — D86.9 SARCOIDOSIS: ICD-10-CM

## 2021-05-04 DIAGNOSIS — O09.90 HIGH-RISK PREGNANCY, UNSPECIFIED TRIMESTER: Primary | ICD-10-CM

## 2021-05-04 PROCEDURE — 76816 OB US FOLLOW-UP PER FETUS: CPT

## 2021-05-04 PROCEDURE — 76816 OB US FOLLOW-UP PER FETUS: CPT | Mod: 26 | Performed by: OBSTETRICS & GYNECOLOGY

## 2021-05-04 NOTE — PROGRESS NOTES
"Please see \"Imaging\" tab under \"Chart Review\" for details of today's US at the [facility].    Jitendra Andujar MD  Maternal-Fetal Medicine      "

## 2021-06-02 PROBLEM — D86.0 PULMONARY SARCOIDOSIS (H): Status: ACTIVE | Noted: 2020-12-16

## 2021-06-02 PROBLEM — O09.299 HISTORY OF POSTPARTUM HEMORRHAGE, CURRENTLY PREGNANT: Status: ACTIVE | Noted: 2020-12-28

## 2021-06-02 PROBLEM — O09.299 HX OF SHOULDER DYSTOCIA IN PRIOR PREGNANCY, CURRENTLY PREGNANT: Status: ACTIVE | Noted: 2020-12-28

## 2021-06-04 ENCOUNTER — HOSPITAL ENCOUNTER (OUTPATIENT)
Dept: ULTRASOUND IMAGING | Facility: CLINIC | Age: 31
End: 2021-06-04
Attending: OBSTETRICS & GYNECOLOGY
Payer: MEDICARE

## 2021-06-04 ENCOUNTER — OFFICE VISIT (OUTPATIENT)
Dept: MATERNAL FETAL MEDICINE | Facility: CLINIC | Age: 31
End: 2021-06-04
Attending: OBSTETRICS & GYNECOLOGY
Payer: MEDICARE

## 2021-06-04 DIAGNOSIS — D86.9 SARCOIDOSIS: ICD-10-CM

## 2021-06-04 DIAGNOSIS — O09.90 HIGH-RISK PREGNANCY, UNSPECIFIED TRIMESTER: Primary | ICD-10-CM

## 2021-06-04 PROCEDURE — 76819 FETAL BIOPHYS PROFIL W/O NST: CPT | Mod: 26 | Performed by: OBSTETRICS & GYNECOLOGY

## 2021-06-04 PROCEDURE — 76819 FETAL BIOPHYS PROFIL W/O NST: CPT

## 2021-06-04 PROCEDURE — 76816 OB US FOLLOW-UP PER FETUS: CPT

## 2021-06-04 PROCEDURE — 76818 FETAL BIOPHYS PROFILE W/NST: CPT

## 2021-06-04 PROCEDURE — 76816 OB US FOLLOW-UP PER FETUS: CPT | Mod: 26 | Performed by: OBSTETRICS & GYNECOLOGY

## 2021-06-04 NOTE — PROGRESS NOTES
"Please see \"Imaging\" tab under \"Chart Review\" for details of today's US at the [facility].    Jitendra Andujar MD  Maternal-Fetal Medicine      "

## 2021-06-25 ENCOUNTER — OFFICE VISIT (OUTPATIENT)
Dept: MATERNAL FETAL MEDICINE | Facility: CLINIC | Age: 31
End: 2021-06-25
Attending: OBSTETRICS & GYNECOLOGY
Payer: MEDICARE

## 2021-06-25 ENCOUNTER — HOSPITAL ENCOUNTER (OUTPATIENT)
Dept: ULTRASOUND IMAGING | Facility: CLINIC | Age: 31
End: 2021-06-25
Attending: OBSTETRICS & GYNECOLOGY
Payer: MEDICARE

## 2021-06-25 DIAGNOSIS — O09.90 HIGH-RISK PREGNANCY, UNSPECIFIED TRIMESTER: Primary | ICD-10-CM

## 2021-06-25 DIAGNOSIS — O40.3XX0 POLYHYDRAMNIOS IN THIRD TRIMESTER COMPLICATION, SINGLE OR UNSPECIFIED FETUS: ICD-10-CM

## 2021-06-25 DIAGNOSIS — D86.9 SARCOIDOSIS: ICD-10-CM

## 2021-06-25 PROCEDURE — 76819 FETAL BIOPHYS PROFIL W/O NST: CPT | Mod: 26 | Performed by: OBSTETRICS & GYNECOLOGY

## 2021-06-25 PROCEDURE — 76819 FETAL BIOPHYS PROFIL W/O NST: CPT

## 2021-06-25 NOTE — PROGRESS NOTES
"Please see \"Imaging\" tab under \"Chart Review\" for details of today's US at the [facility].    Jitendra Andujar MD  Maternal-Fetal Medicine        "

## 2021-06-26 PROBLEM — O40.9XX0 POLYHYDRAMNIOS: Status: ACTIVE | Noted: 2021-06-26

## 2021-07-02 ENCOUNTER — OFFICE VISIT (OUTPATIENT)
Dept: OBGYN | Facility: CLINIC | Age: 31
End: 2021-07-02
Payer: MEDICARE

## 2021-07-02 ENCOUNTER — OFFICE VISIT (OUTPATIENT)
Dept: MATERNAL FETAL MEDICINE | Facility: CLINIC | Age: 31
End: 2021-07-02
Attending: OBSTETRICS & GYNECOLOGY
Payer: MEDICARE

## 2021-07-02 ENCOUNTER — HOSPITAL ENCOUNTER (OUTPATIENT)
Dept: ULTRASOUND IMAGING | Facility: CLINIC | Age: 31
End: 2021-07-02
Attending: OBSTETRICS & GYNECOLOGY
Payer: MEDICARE

## 2021-07-02 VITALS
WEIGHT: 212.4 LBS | DIASTOLIC BLOOD PRESSURE: 76 MMHG | BODY MASS INDEX: 40.13 KG/M2 | SYSTOLIC BLOOD PRESSURE: 115 MMHG | HEART RATE: 105 BPM

## 2021-07-02 DIAGNOSIS — O09.299 HISTORY OF POSTPARTUM HEMORRHAGE, CURRENTLY PREGNANT: ICD-10-CM

## 2021-07-02 DIAGNOSIS — O36.63X0 EXCESSIVE FETAL GROWTH AFFECTING MANAGEMENT OF PREGNANCY IN THIRD TRIMESTER, SINGLE OR UNSPECIFIED FETUS: ICD-10-CM

## 2021-07-02 DIAGNOSIS — O99.810 ABNORMAL GLUCOSE COMPLICATING PREGNANCY: ICD-10-CM

## 2021-07-02 DIAGNOSIS — F43.21 ADJUSTMENT DISORDER WITH DEPRESSED MOOD: ICD-10-CM

## 2021-07-02 DIAGNOSIS — O40.3XX0 POLYHYDRAMNIOS IN THIRD TRIMESTER COMPLICATION, SINGLE OR UNSPECIFIED FETUS: ICD-10-CM

## 2021-07-02 DIAGNOSIS — D86.9 SARCOIDOSIS: ICD-10-CM

## 2021-07-02 DIAGNOSIS — O09.891 SUPERVISION OF OTHER HIGH RISK PREGNANCIES, FIRST TRIMESTER: Primary | ICD-10-CM

## 2021-07-02 DIAGNOSIS — O36.63X0 EXCESSIVE FETAL GROWTH AFFECTING MANAGEMENT OF PREGNANCY IN THIRD TRIMESTER, SINGLE OR UNSPECIFIED FETUS: Primary | ICD-10-CM

## 2021-07-02 DIAGNOSIS — O09.299 HX OF SHOULDER DYSTOCIA IN PRIOR PREGNANCY, CURRENTLY PREGNANT: ICD-10-CM

## 2021-07-02 PROCEDURE — 76816 OB US FOLLOW-UP PER FETUS: CPT | Mod: 26 | Performed by: OBSTETRICS & GYNECOLOGY

## 2021-07-02 PROCEDURE — 76819 FETAL BIOPHYS PROFIL W/O NST: CPT | Mod: 26 | Performed by: OBSTETRICS & GYNECOLOGY

## 2021-07-02 PROCEDURE — 76819 FETAL BIOPHYS PROFIL W/O NST: CPT

## 2021-07-02 PROCEDURE — G0463 HOSPITAL OUTPT CLINIC VISIT: HCPCS | Mod: 25

## 2021-07-02 PROCEDURE — 99207 PR PRENATAL VISIT: CPT | Performed by: ADVANCED PRACTICE MIDWIFE

## 2021-07-02 ASSESSMENT — PAIN SCALES - GENERAL: PAINLEVEL: NO PAIN (0)

## 2021-07-02 NOTE — LETTER
July 2, 2021      RE: Juventino THOMAS Jayla  [address]   [place] [address]        To whom it may concern:    Juventino Dickerson is under my professional care for    Supervision of other high risk pregnancies, first trimester      The following restrictions apply until she delivers her baby:  A) Bend: infrequently  B) Squat: Not at all (0 hours)  C) Walk/Stand: Occasionally (1-3 hours)  D) Reach Above Shoulders: Occasionally (1-3 hours)  E) Lift, carry, push, and pull no more than:  0-10 lbs.Light duty-unable to lift more than 10 pounds   F) Restriction from working outside in the sun.      Sincerely,      Stephanie LEDESMA

## 2021-07-02 NOTE — PROGRESS NOTES
"Please see \"Imaging\" tab under \"Chart Review\" for details of today's visit.    Amor Thomas    "

## 2021-07-02 NOTE — PROGRESS NOTES
Subjective:      30 year old  at 36w0d presentst for a routine prenatal appointment.    mo vaginal bleeding or leakage of fluid.  rare contractions. pos fetal movement.       No HA, visual changes, RUQ or epigastric pain.   Patient concerns:    Feeling well overall. Some struggles w sleep. Here w niece.  Has not done gct yet-not able to do 1hr today  Reviewed TDAP Declines  Objective:  Vitals:    21 1018   BP: 115/76   Pulse: 105   Weight: 96.3 kg (212 lb 6.4 oz)   , see ob flowsheet  Assessment/Plan     Encounter Diagnoses   Name Primary?     Supervision of other high risk pregnancies, first trimester Yes     Hx of shoulder dystocia in prior pregnancy, currently pregnant      History of postpartum hemorrhage, currently pregnant      Adjustment disorder with depressed mood      Polyhydramnios in third trimester complication, single or unspecified fetus      Excessive fetal growth affecting management of pregnancy in third trimester, single or unspecified fetus      Abnormal glucose complicating pregnancy       Orders Placed This Encounter   Procedures     CBC with Platelets     Glucose     Hgb A1c     Treponema Abs w Reflex to RPR and Titer     No orders of the defined types were placed in this encounter.    ABO   Date Value Ref Range Status   2021 O  Final     RH(D)   Date Value Ref Range Status   2021 Pos  Final     Antibody Screen   Date Value Ref Range Status   2021 Neg  Final   , Rhogam  was notgiven.    - Reviewed total weight gain, encouraged continued healthy diet and exercise.  .  Reviewed importance of daily fetal kick count and why/how to contact provider.    - Reviewed why/how to contact provider if headache/visual changes/RUQ or epigastric pain, decreased fetal movement, vaginal bleeding, leakage of fluid or more than 4 contractions in an hour.     Patient education/orders or handouts today:  PTL signs/symptoms and TDAP  Reviewed GBS screening at 37  wks.  Discussed  "recommendation for primary  given given last baby LGA,shoulder dystocia and this baby measuring large. Pt feels this baby is just as big or bigger. When asked, pt teary and remembers how scary the last birth was \"so many doctors came in, they were scared and crying too\"  She is afraid of surgery. Discussed expectations and reality recovering from surgery, would recommend 39 wks. Going to [facility] for US and growth now.  Strongly encourged to go to lab for blood draw. Discussed concerns for GDM  Will work with Rk Vernon CNM IBCLC for lactation support pp  Return to clinic in 1 weeks and prn if questions or concerns.     Stephanie Velazquez, APRN CNM      "

## 2021-07-03 DIAGNOSIS — O99.810 ABNORMAL GLUCOSE COMPLICATING PREGNANCY: ICD-10-CM

## 2021-07-03 DIAGNOSIS — O09.891 SUPERVISION OF OTHER HIGH RISK PREGNANCIES, FIRST TRIMESTER: ICD-10-CM

## 2021-07-03 DIAGNOSIS — O40.3XX0 POLYHYDRAMNIOS IN THIRD TRIMESTER COMPLICATION, SINGLE OR UNSPECIFIED FETUS: ICD-10-CM

## 2021-07-03 LAB
ERYTHROCYTE [DISTWIDTH] IN BLOOD BY AUTOMATED COUNT: 14.5 % (ref 10–15)
GLUCOSE SERPL-MCNC: 101 MG/DL (ref 70–99)
HBA1C MFR BLD: 4.6 % (ref 0–5.6)
HCT VFR BLD AUTO: 31.7 % (ref 35–47)
HGB BLD-MCNC: 10.7 G/DL (ref 11.7–15.7)
MCH RBC QN AUTO: 31 PG (ref 26.5–33)
MCHC RBC AUTO-ENTMCNC: 33.8 G/DL (ref 31.5–36.5)
MCV RBC AUTO: 92 FL (ref 78–100)
PLATELET # BLD AUTO: 250 10E9/L (ref 150–450)
RBC # BLD AUTO: 3.45 10E12/L (ref 3.8–5.2)
WBC # BLD AUTO: 7.7 10E9/L (ref 4–11)

## 2021-07-03 PROCEDURE — 85027 COMPLETE CBC AUTOMATED: CPT | Performed by: ADVANCED PRACTICE MIDWIFE

## 2021-07-03 PROCEDURE — 36415 COLL VENOUS BLD VENIPUNCTURE: CPT | Performed by: ADVANCED PRACTICE MIDWIFE

## 2021-07-03 PROCEDURE — 83036 HEMOGLOBIN GLYCOSYLATED A1C: CPT | Performed by: ADVANCED PRACTICE MIDWIFE

## 2021-07-03 PROCEDURE — 82947 ASSAY GLUCOSE BLOOD QUANT: CPT | Performed by: ADVANCED PRACTICE MIDWIFE

## 2021-07-03 PROCEDURE — 86780 TREPONEMA PALLIDUM: CPT | Performed by: ADVANCED PRACTICE MIDWIFE

## 2021-07-04 LAB — T PALLIDUM AB SER QL: NONREACTIVE

## 2021-07-07 ENCOUNTER — OFFICE VISIT (OUTPATIENT)
Dept: OBGYN | Facility: CLINIC | Age: 31
End: 2021-07-07
Attending: ADVANCED PRACTICE MIDWIFE
Payer: MEDICARE

## 2021-07-07 VITALS
HEIGHT: 61 IN | HEART RATE: 103 BPM | SYSTOLIC BLOOD PRESSURE: 116 MMHG | DIASTOLIC BLOOD PRESSURE: 78 MMHG | BODY MASS INDEX: 37.91 KG/M2 | WEIGHT: 200.8 LBS

## 2021-07-07 DIAGNOSIS — O09.93 HRP (HIGH RISK PREGNANCY), THIRD TRIMESTER: Primary | ICD-10-CM

## 2021-07-07 PROCEDURE — 99207 PR PRENATAL VISIT: CPT | Performed by: ADVANCED PRACTICE MIDWIFE

## 2021-07-07 PROCEDURE — 87653 STREP B DNA AMP PROBE: CPT | Performed by: ADVANCED PRACTICE MIDWIFE

## 2021-07-07 PROCEDURE — G0463 HOSPITAL OUTPT CLINIC VISIT: HCPCS

## 2021-07-07 ASSESSMENT — MIFFLIN-ST. JEOR: SCORE: 1573.2

## 2021-07-07 NOTE — LETTER
"2021       RE: Juventino Dickerson  [address]  [place] [address]     Dear Colleague,    Thank you for referring your patient, Juventino Dickerson, to the [facility] [place] at [facility]. Please see a copy of my visit note below.    Subjective:      30 year old  at 36w5d presents for a routine prenatal appointment.         Denies vaginal bleeding,  leakage of fluid, or change in vaginal discharge.  Reports irregular contractions.  Reports regular fetal movement.     No HA, visual changes, RUQ or epigastric pain.   Patient concerns: Does not want to stay for 1hr GCT today.  Consents to GBS screening.  Has US follow up with MFM   Wondering about mode of delivery and timing of delivery.  Objective:  Vitals:    21 1054   BP: 116/78   BP Location: Left arm   Patient Position: Chair   Pulse: 103   Weight: 91.1 kg (200 lb 12.8 oz)   Height: 1.549 m (5' 1\")    See OB flowsheet    Assessment/Plan     Encounter Diagnosis   Name Primary?     HRP (high risk pregnancy), third trimester Yes         GBS screening: Obtained.  Labor signs discussed. Reinforced daily fetal movement counts.  Reviewed why/how to contact provider if headache/visual changes/RUQ or epigastric pain, decreased fetal movement, vaginal bleeding, leakage of fluid.   Return to clinic in 1 week and prn if questions or concerns.     ELMA Montiel CNM    "

## 2021-07-07 NOTE — PROGRESS NOTES
"Subjective:      30 year old  at 36w5d presents for a routine prenatal appointment.         Denies vaginal bleeding,  leakage of fluid, or change in vaginal discharge.  Reports irregular contractions.  Reports regular fetal movement.     No HA, visual changes, RUQ or epigastric pain.   Patient concerns: Does not want to stay for 1hr GCT today.  Consents to GBS screening.  Has US follow up with MFM   Wondering about mode of delivery and timing of delivery.  Objective:  Vitals:    21 1054   BP: 116/78   BP Location: Left arm   Patient Position: Chair   Pulse: 103   Weight: 91.1 kg (200 lb 12.8 oz)   Height: 1.549 m (5' 1\")    See OB flowsheet    Assessment/Plan     Encounter Diagnosis   Name Primary?     HRP (high risk pregnancy), third trimester Yes         GBS screening: Obtained.  Labor signs discussed. Reinforced daily fetal movement counts.  Reviewed why/how to contact provider if headache/visual changes/RUQ or epigastric pain, decreased fetal movement, vaginal bleeding, leakage of fluid.   Return to clinic in 1 week and prn if questions or concerns.     ELMA MontielM  "

## 2021-07-08 LAB
GP B STREP DNA SPEC QL NAA+PROBE: NEGATIVE
SPECIMEN SOURCE: NORMAL

## 2021-07-16 ENCOUNTER — HOSPITAL ENCOUNTER (OUTPATIENT)
Dept: ULTRASOUND IMAGING | Facility: CLINIC | Age: 31
End: 2021-07-16
Attending: OBSTETRICS & GYNECOLOGY
Payer: MEDICARE

## 2021-07-16 ENCOUNTER — OFFICE VISIT (OUTPATIENT)
Dept: MATERNAL FETAL MEDICINE | Facility: CLINIC | Age: 31
End: 2021-07-16
Attending: OBSTETRICS & GYNECOLOGY
Payer: MEDICARE

## 2021-07-16 DIAGNOSIS — O36.63X0 EXCESSIVE FETAL GROWTH AFFECTING MANAGEMENT OF PREGNANCY IN THIRD TRIMESTER, SINGLE OR UNSPECIFIED FETUS: ICD-10-CM

## 2021-07-16 DIAGNOSIS — O40.3XX0 POLYHYDRAMNIOS IN THIRD TRIMESTER COMPLICATION, SINGLE OR UNSPECIFIED FETUS: ICD-10-CM

## 2021-07-16 DIAGNOSIS — O36.60X0 FETAL MACROSOMIA DURING PREGNANCY, ANTEPARTUM, SINGLE OR UNSPECIFIED FETUS: Primary | ICD-10-CM

## 2021-07-16 PROCEDURE — 76819 FETAL BIOPHYS PROFIL W/O NST: CPT | Mod: 26 | Performed by: OBSTETRICS & GYNECOLOGY

## 2021-07-16 PROCEDURE — 76819 FETAL BIOPHYS PROFIL W/O NST: CPT

## 2021-07-16 NOTE — PROGRESS NOTES
Please see the imaging tab for details of the ultrasound performed today.    Becka Ramirez MD  Specialist in Maternal-Fetal Medicine

## 2021-07-20 ENCOUNTER — OFFICE VISIT (OUTPATIENT)
Dept: OBGYN | Facility: CLINIC | Age: 31
End: 2021-07-20
Attending: ADVANCED PRACTICE MIDWIFE
Payer: MEDICARE

## 2021-07-20 ENCOUNTER — LAB (OUTPATIENT)
Dept: LAB | Facility: CLINIC | Age: 31
End: 2021-07-20
Attending: ADVANCED PRACTICE MIDWIFE
Payer: MEDICARE

## 2021-07-20 VITALS
DIASTOLIC BLOOD PRESSURE: 80 MMHG | BODY MASS INDEX: 37.85 KG/M2 | HEART RATE: 102 BPM | WEIGHT: 200.3 LBS | SYSTOLIC BLOOD PRESSURE: 119 MMHG

## 2021-07-20 DIAGNOSIS — O09.93 HRP (HIGH RISK PREGNANCY), THIRD TRIMESTER: Primary | ICD-10-CM

## 2021-07-20 DIAGNOSIS — O09.93 HRP (HIGH RISK PREGNANCY), THIRD TRIMESTER: ICD-10-CM

## 2021-07-20 LAB — SARS-COV-2 RNA RESP QL NAA+PROBE: NEGATIVE

## 2021-07-20 PROCEDURE — 99207 PR PRENATAL VISIT: CPT | Performed by: ADVANCED PRACTICE MIDWIFE

## 2021-07-20 PROCEDURE — U0003 INFECTIOUS AGENT DETECTION BY NUCLEIC ACID (DNA OR RNA); SEVERE ACUTE RESPIRATORY SYNDROME CORONAVIRUS 2 (SARS-COV-2) (CORONAVIRUS DISEASE [COVID-19]), AMPLIFIED PROBE TECHNIQUE, MAKING USE OF HIGH THROUGHPUT TECHNOLOGIES AS DESCRIBED BY CMS-2020-01-R: HCPCS

## 2021-07-20 PROCEDURE — U0005 INFEC AGEN DETEC AMPLI PROBE: HCPCS

## 2021-07-20 PROCEDURE — G0463 HOSPITAL OUTPT CLINIC VISIT: HCPCS

## 2021-07-20 RX ORDER — ACETAMINOPHEN 500 MG
500-1000 TABLET ORAL EVERY 6 HOURS PRN
Qty: 90 TABLET | Refills: 0 | Status: SHIPPED | OUTPATIENT
Start: 2021-07-20

## 2021-07-20 RX ORDER — IBUPROFEN 600 MG/1
600 TABLET, FILM COATED ORAL EVERY 6 HOURS PRN
Qty: 90 TABLET | Refills: 0 | Status: SHIPPED | OUTPATIENT
Start: 2021-07-20 | End: 2024-05-21

## 2021-07-20 RX ORDER — SENNA AND DOCUSATE SODIUM 50; 8.6 MG/1; MG/1
1 TABLET, FILM COATED ORAL 2 TIMES DAILY PRN
Qty: 90 TABLET | Refills: 0 | Status: SHIPPED | OUTPATIENT
Start: 2021-07-20

## 2021-07-20 NOTE — PROGRESS NOTES
Subjective:     Juventino is 30 year old  at 38w4d presents for routine prenatal visit. She is feeling fine and is anticipating the delivery of the baby. She does not feel SOB and states that her sarcoidosis has resolved after finishing treatment with rheumatology. She would like to have a vaginal delivery. Her mood is fine and she has had no suicidal thoughts or loss of interest in usual activities. Not currently smoking, using etoh, or other drugs.            No vaginal bleeding or leakage of fluid.  No contractions. Normal fetal movement.        No HA, visual changes, RUQ or epigastric pain.   Patient concerns: Concerned about when the induction for this week will be. Feeling well overall.    Objective:  Vitals:    21 0929   BP: 119/80   Pulse: 102   Weight: 90.9 kg (200 lb 4.8 oz)    See OB flowsheet  Assessment/Plan     Encounter Diagnosis   Name Primary?     HRP (high risk pregnancy), third trimester Yes         - Reviewed  and rationale for induction of labor based on results.   -Patient desires induction of labor. Set up an I&D appointment in L&D and discussed what to expect for the induction on Friday, 2021.  Covid testing ordered.  - Reviewed why/how to contact provider if headache/visual changes/RUQ or epigastric pain, decreased fetal movement, vaginal bleeding, leakage of fluid or strong/regular contractions.      I, Thang JASMINE serving as a scribe; to document services personally performed by  Ana Melissa CNM,  based on data collection and the provider's statements to me.     AUDREY JASMINE    I agree with the PFSH and ROS as completed by AUDREY Jasmine except for changes made by me. The remainder of the encounter was performed by me and scribed by AUDREY Jasmine. The scribed note accurately reflects my personal services and decisions made by me.   ELMA Montiel CNM

## 2021-07-20 NOTE — LETTER
2021       RE: Juventino Dickerson  [address]  [place] [address]     Dear Colleague,    Thank you for referring your patient, Juventino Dickerson, to the [facility] [place] at [facility]. Please see a copy of my visit note below.    Subjective:     Juventino is 30 year old  at 38w4d presents for routine prenatal visit. She is feeling fine and is anticipating the delivery of the baby. She does not feel SOB and states that her sarcoidosis has resolved after finishing treatment with rheumatology. She would like to have a vaginal delivery. Her mood is fine and she has had no suicidal thoughts or loss of interest in usual activities. Not currently smoking, using etoh, or other drugs.            No vaginal bleeding or leakage of fluid.  No contractions. Normal fetal movement.        No HA, visual changes, RUQ or epigastric pain.   Patient concerns: Concerned about when the induction for this week will be. Feeling well overall.    Objective:  Vitals:    21 0929   BP: 119/80   Pulse: 102   Weight: 90.9 kg (200 lb 4.8 oz)    See OB flowsheet  Assessment/Plan     Encounter Diagnosis   Name Primary?     HRP (high risk pregnancy), third trimester Yes         - Reviewed  and rationale for induction of labor based on results.   -Patient desires induction of labor. Set up an I&D appointment in L&D and discussed what to expect for the induction on Friday, 2021.  Covid testing ordered.  - Reviewed why/how to contact provider if headache/visual changes/RUQ or epigastric pain, decreased fetal movement, vaginal bleeding, leakage of fluid or strong/regular contractions.      I, Pablito MANCUSOm serving as a scribe; to document services personally performed by  Ana Melissa CNM,  based on data collection and the provider's statements to me.     BALDEMAR OWENS MS3    I agree with the PFSH and ROS as completed by Baldemar Owens MS3  except for changes made by me. The remainder of the encounter was performed by me and scribed by Baldemar Owens MS3. The scribed note accurately reflects my personal services and decisions made by me.   ELMA Montiel CNM

## 2021-07-20 NOTE — PATIENT INSTRUCTIONS
Induction of labor scheduled on Friday July 23, 2021 at 10:00am.  Please call labor and delivery around 9:00am to check in.  The phone number to call is [phone].  Have breakfast before you arrive.

## 2021-07-23 ENCOUNTER — HOSPITAL ENCOUNTER (INPATIENT)
Facility: CLINIC | Age: 31
LOS: 2 days | Discharge: HOME HEALTH | End: 2021-07-25
Attending: MIDWIFE | Admitting: OBSTETRICS & GYNECOLOGY
Payer: MEDICARE

## 2021-07-23 ENCOUNTER — ANESTHESIA EVENT (OUTPATIENT)
Dept: OBGYN | Facility: CLINIC | Age: 31
End: 2021-07-23
Payer: MEDICARE

## 2021-07-23 ENCOUNTER — ANESTHESIA (OUTPATIENT)
Dept: OBGYN | Facility: CLINIC | Age: 31
End: 2021-07-23
Payer: MEDICARE

## 2021-07-23 DIAGNOSIS — O09.891 SUPERVISION OF OTHER HIGH RISK PREGNANCIES, FIRST TRIMESTER: Primary | ICD-10-CM

## 2021-07-23 LAB
ABO/RH(D): NORMAL
ANTIBODY SCREEN: NEGATIVE
BASOPHILS # BLD AUTO: 0 10E3/UL (ref 0–0.2)
BASOPHILS NFR BLD AUTO: 0 %
EOSINOPHIL # BLD AUTO: 0.1 10E3/UL (ref 0–0.7)
EOSINOPHIL NFR BLD AUTO: 2 %
ERYTHROCYTE [DISTWIDTH] IN BLOOD BY AUTOMATED COUNT: 14.6 % (ref 10–15)
GLUCOSE BLDC GLUCOMTR-MCNC: 71 MG/DL (ref 70–99)
HCT VFR BLD AUTO: 32.2 % (ref 35–47)
HGB BLD-MCNC: 10.4 G/DL (ref 11.7–15.7)
IMM GRANULOCYTES # BLD: 0.1 10E3/UL
IMM GRANULOCYTES NFR BLD: 2 %
LYMPHOCYTES # BLD AUTO: 1.2 10E3/UL (ref 0.8–5.3)
LYMPHOCYTES NFR BLD AUTO: 20 %
MCH RBC QN AUTO: 30.6 PG (ref 26.5–33)
MCHC RBC AUTO-ENTMCNC: 32.3 G/DL (ref 31.5–36.5)
MCV RBC AUTO: 95 FL (ref 78–100)
MONOCYTES # BLD AUTO: 0.3 10E3/UL (ref 0–1.3)
MONOCYTES NFR BLD AUTO: 6 %
NEUTROPHILS # BLD AUTO: 4.2 10E3/UL (ref 1.6–8.3)
NEUTROPHILS NFR BLD AUTO: 70 %
NRBC # BLD AUTO: 0 10E3/UL
NRBC BLD AUTO-RTO: 0 /100
PLATELET # BLD AUTO: 235 10E3/UL (ref 150–450)
RBC # BLD AUTO: 3.4 10E6/UL (ref 3.8–5.2)
SPECIMEN EXPIRATION DATE: NORMAL
WBC # BLD AUTO: 5.9 10E3/UL (ref 4–11)

## 2021-07-23 PROCEDURE — 86900 BLOOD TYPING SEROLOGIC ABO: CPT | Performed by: STUDENT IN AN ORGANIZED HEALTH CARE EDUCATION/TRAINING PROGRAM

## 2021-07-23 PROCEDURE — 272N000001 HC OR GENERAL SUPPLY STERILE: Performed by: OBSTETRICS & GYNECOLOGY

## 2021-07-23 PROCEDURE — 258N000003 HC RX IP 258 OP 636: Performed by: NURSE ANESTHETIST, CERTIFIED REGISTERED

## 2021-07-23 PROCEDURE — 370N000017 HC ANESTHESIA TECHNICAL FEE, PER MIN: Performed by: OBSTETRICS & GYNECOLOGY

## 2021-07-23 PROCEDURE — 59510 CESAREAN DELIVERY: CPT | Mod: GC | Performed by: STUDENT IN AN ORGANIZED HEALTH CARE EDUCATION/TRAINING PROGRAM

## 2021-07-23 PROCEDURE — 710N000010 HC RECOVERY PHASE 1, LEVEL 2, PER MIN: Performed by: OBSTETRICS & GYNECOLOGY

## 2021-07-23 PROCEDURE — 250N000009 HC RX 250: Performed by: ANESTHESIOLOGY

## 2021-07-23 PROCEDURE — 250N000011 HC RX IP 250 OP 636: Performed by: ANESTHESIOLOGY

## 2021-07-23 PROCEDURE — 258N000003 HC RX IP 258 OP 636: Performed by: STUDENT IN AN ORGANIZED HEALTH CARE EDUCATION/TRAINING PROGRAM

## 2021-07-23 PROCEDURE — 271N000001 HC OR GENERAL SUPPLY NON-STERILE: Performed by: OBSTETRICS & GYNECOLOGY

## 2021-07-23 PROCEDURE — 360N000076 HC SURGERY LEVEL 3, PER MIN: Performed by: OBSTETRICS & GYNECOLOGY

## 2021-07-23 PROCEDURE — 999N000141 HC STATISTIC PRE-PROCEDURE NURSING ASSESSMENT: Performed by: OBSTETRICS & GYNECOLOGY

## 2021-07-23 PROCEDURE — 250N000011 HC RX IP 250 OP 636: Performed by: STUDENT IN AN ORGANIZED HEALTH CARE EDUCATION/TRAINING PROGRAM

## 2021-07-23 PROCEDURE — 85025 COMPLETE CBC W/AUTO DIFF WBC: CPT | Performed by: STUDENT IN AN ORGANIZED HEALTH CARE EDUCATION/TRAINING PROGRAM

## 2021-07-23 PROCEDURE — 258N000003 HC RX IP 258 OP 636: Performed by: ANESTHESIOLOGY

## 2021-07-23 PROCEDURE — 120N000002 HC R&B MED SURG/OB UMMC

## 2021-07-23 PROCEDURE — 36415 COLL VENOUS BLD VENIPUNCTURE: CPT | Performed by: STUDENT IN AN ORGANIZED HEALTH CARE EDUCATION/TRAINING PROGRAM

## 2021-07-23 PROCEDURE — 86780 TREPONEMA PALLIDUM: CPT | Performed by: STUDENT IN AN ORGANIZED HEALTH CARE EDUCATION/TRAINING PROGRAM

## 2021-07-23 PROCEDURE — 250N000013 HC RX MED GY IP 250 OP 250 PS 637: Performed by: STUDENT IN AN ORGANIZED HEALTH CARE EDUCATION/TRAINING PROGRAM

## 2021-07-23 PROCEDURE — 250N000009 HC RX 250: Performed by: STUDENT IN AN ORGANIZED HEALTH CARE EDUCATION/TRAINING PROGRAM

## 2021-07-23 RX ORDER — MORPHINE SULFATE 1 MG/ML
INJECTION, SOLUTION EPIDURAL; INTRATHECAL; INTRAVENOUS
Status: COMPLETED | OUTPATIENT
Start: 2021-07-23 | End: 2021-07-23

## 2021-07-23 RX ORDER — FENTANYL CITRATE-0.9 % NACL/PF 10 MCG/ML
100 PLASTIC BAG, INJECTION (ML) INTRAVENOUS EVERY 5 MIN PRN
Status: DISCONTINUED | OUTPATIENT
Start: 2021-07-23 | End: 2021-07-23 | Stop reason: HOSPADM

## 2021-07-23 RX ORDER — METHYLERGONOVINE MALEATE 0.2 MG/ML
200 INJECTION INTRAVENOUS
Status: DISCONTINUED | OUTPATIENT
Start: 2021-07-23 | End: 2021-07-23 | Stop reason: HOSPADM

## 2021-07-23 RX ORDER — OXYTOCIN 10 [USP'U]/ML
10 INJECTION, SOLUTION INTRAMUSCULAR; INTRAVENOUS
Status: DISCONTINUED | OUTPATIENT
Start: 2021-07-23 | End: 2021-07-24

## 2021-07-23 RX ORDER — ONDANSETRON 2 MG/ML
4 INJECTION INTRAMUSCULAR; INTRAVENOUS EVERY 6 HOURS PRN
Status: DISCONTINUED | OUTPATIENT
Start: 2021-07-23 | End: 2021-07-25 | Stop reason: HOSPADM

## 2021-07-23 RX ORDER — OXYCODONE HYDROCHLORIDE 5 MG/1
5 TABLET ORAL EVERY 4 HOURS PRN
Status: DISCONTINUED | OUTPATIENT
Start: 2021-07-23 | End: 2021-07-25 | Stop reason: HOSPADM

## 2021-07-23 RX ORDER — SIMETHICONE 80 MG
80 TABLET,CHEWABLE ORAL 4 TIMES DAILY PRN
Status: DISCONTINUED | OUTPATIENT
Start: 2021-07-23 | End: 2021-07-25 | Stop reason: HOSPADM

## 2021-07-23 RX ORDER — SODIUM CHLORIDE, SODIUM LACTATE, POTASSIUM CHLORIDE, CALCIUM CHLORIDE 600; 310; 30; 20 MG/100ML; MG/100ML; MG/100ML; MG/100ML
INJECTION, SOLUTION INTRAVENOUS CONTINUOUS
Status: DISCONTINUED | OUTPATIENT
Start: 2021-07-23 | End: 2021-07-23 | Stop reason: HOSPADM

## 2021-07-23 RX ORDER — MISOPROSTOL 200 UG/1
800 TABLET ORAL
Status: DISCONTINUED | OUTPATIENT
Start: 2021-07-23 | End: 2021-07-23 | Stop reason: HOSPADM

## 2021-07-23 RX ORDER — FENTANYL CITRATE 50 UG/ML
INJECTION, SOLUTION INTRAMUSCULAR; INTRAVENOUS
Status: COMPLETED | OUTPATIENT
Start: 2021-07-23 | End: 2021-07-23

## 2021-07-23 RX ORDER — OXYTOCIN/0.9 % SODIUM CHLORIDE 30/500 ML
340 PLASTIC BAG, INJECTION (ML) INTRAVENOUS CONTINUOUS PRN
Status: DISCONTINUED | OUTPATIENT
Start: 2021-07-23 | End: 2021-07-23 | Stop reason: HOSPADM

## 2021-07-23 RX ORDER — CARBOPROST TROMETHAMINE 250 UG/ML
250 INJECTION, SOLUTION INTRAMUSCULAR
Status: DISCONTINUED | OUTPATIENT
Start: 2021-07-23 | End: 2021-07-23 | Stop reason: HOSPADM

## 2021-07-23 RX ORDER — CARBOPROST TROMETHAMINE 250 UG/ML
250 INJECTION, SOLUTION INTRAMUSCULAR
Status: DISCONTINUED | OUTPATIENT
Start: 2021-07-23 | End: 2021-07-25 | Stop reason: HOSPADM

## 2021-07-23 RX ORDER — NALOXONE HYDROCHLORIDE 0.4 MG/ML
0.2 INJECTION, SOLUTION INTRAMUSCULAR; INTRAVENOUS; SUBCUTANEOUS
Status: DISCONTINUED | OUTPATIENT
Start: 2021-07-23 | End: 2021-07-24

## 2021-07-23 RX ORDER — LIDOCAINE 40 MG/G
CREAM TOPICAL
Status: DISCONTINUED | OUTPATIENT
Start: 2021-07-23 | End: 2021-07-23 | Stop reason: HOSPADM

## 2021-07-23 RX ORDER — HYDROCORTISONE 2.5 %
CREAM (GRAM) TOPICAL 3 TIMES DAILY PRN
Status: DISCONTINUED | OUTPATIENT
Start: 2021-07-23 | End: 2021-07-25 | Stop reason: HOSPADM

## 2021-07-23 RX ORDER — ACETAMINOPHEN 325 MG/1
975 TABLET ORAL EVERY 6 HOURS
Status: DISCONTINUED | OUTPATIENT
Start: 2021-07-23 | End: 2021-07-25 | Stop reason: HOSPADM

## 2021-07-23 RX ORDER — MISOPROSTOL 200 UG/1
400 TABLET ORAL
Status: DISCONTINUED | OUTPATIENT
Start: 2021-07-23 | End: 2021-07-23 | Stop reason: HOSPADM

## 2021-07-23 RX ORDER — SODIUM CHLORIDE, SODIUM LACTATE, POTASSIUM CHLORIDE, CALCIUM CHLORIDE 600; 310; 30; 20 MG/100ML; MG/100ML; MG/100ML; MG/100ML
INJECTION, SOLUTION INTRAVENOUS CONTINUOUS PRN
Status: DISCONTINUED | OUTPATIENT
Start: 2021-07-23 | End: 2021-07-23

## 2021-07-23 RX ORDER — IBUPROFEN 800 MG/1
800 TABLET, FILM COATED ORAL EVERY 6 HOURS
Status: DISCONTINUED | OUTPATIENT
Start: 2021-07-24 | End: 2021-07-25 | Stop reason: HOSPADM

## 2021-07-23 RX ORDER — BISACODYL 10 MG
10 SUPPOSITORY, RECTAL RECTAL DAILY PRN
Status: DISCONTINUED | OUTPATIENT
Start: 2021-07-25 | End: 2021-07-25 | Stop reason: HOSPADM

## 2021-07-23 RX ORDER — PROCHLORPERAZINE MALEATE 10 MG
10 TABLET ORAL EVERY 6 HOURS PRN
Status: DISCONTINUED | OUTPATIENT
Start: 2021-07-23 | End: 2021-07-25 | Stop reason: HOSPADM

## 2021-07-23 RX ORDER — DIPHENHYDRAMINE HYDROCHLORIDE 50 MG/ML
25 INJECTION INTRAMUSCULAR; INTRAVENOUS EVERY 6 HOURS PRN
Status: DISCONTINUED | OUTPATIENT
Start: 2021-07-23 | End: 2021-07-25 | Stop reason: HOSPADM

## 2021-07-23 RX ORDER — OXYTOCIN/0.9 % SODIUM CHLORIDE 30/500 ML
PLASTIC BAG, INJECTION (ML) INTRAVENOUS CONTINUOUS PRN
Status: DISCONTINUED | OUTPATIENT
Start: 2021-07-23 | End: 2021-07-23

## 2021-07-23 RX ORDER — PROCHLORPERAZINE 25 MG
25 SUPPOSITORY, RECTAL RECTAL EVERY 12 HOURS PRN
Status: DISCONTINUED | OUTPATIENT
Start: 2021-07-23 | End: 2021-07-25 | Stop reason: HOSPADM

## 2021-07-23 RX ORDER — ACETAMINOPHEN 325 MG/1
975 TABLET ORAL ONCE
Status: COMPLETED | OUTPATIENT
Start: 2021-07-23 | End: 2021-07-23

## 2021-07-23 RX ORDER — KETOROLAC TROMETHAMINE 30 MG/ML
30 INJECTION, SOLUTION INTRAMUSCULAR; INTRAVENOUS EVERY 6 HOURS
Status: COMPLETED | OUTPATIENT
Start: 2021-07-23 | End: 2021-07-24

## 2021-07-23 RX ORDER — ONDANSETRON 4 MG/1
4 TABLET, ORALLY DISINTEGRATING ORAL EVERY 6 HOURS PRN
Status: DISCONTINUED | OUTPATIENT
Start: 2021-07-23 | End: 2021-07-25 | Stop reason: HOSPADM

## 2021-07-23 RX ORDER — METOCLOPRAMIDE HYDROCHLORIDE 5 MG/ML
10 INJECTION INTRAMUSCULAR; INTRAVENOUS EVERY 6 HOURS PRN
Status: DISCONTINUED | OUTPATIENT
Start: 2021-07-23 | End: 2021-07-25 | Stop reason: HOSPADM

## 2021-07-23 RX ORDER — CITRIC ACID/SODIUM CITRATE 334-500MG
30 SOLUTION, ORAL ORAL
Status: DISCONTINUED | OUTPATIENT
Start: 2021-07-23 | End: 2021-07-23 | Stop reason: HOSPADM

## 2021-07-23 RX ORDER — NALOXONE HYDROCHLORIDE 0.4 MG/ML
0.4 INJECTION, SOLUTION INTRAMUSCULAR; INTRAVENOUS; SUBCUTANEOUS
Status: DISCONTINUED | OUTPATIENT
Start: 2021-07-23 | End: 2021-07-24

## 2021-07-23 RX ORDER — CEFAZOLIN SODIUM 2 G/100ML
2 INJECTION, SOLUTION INTRAVENOUS SEE ADMIN INSTRUCTIONS
Status: DISCONTINUED | OUTPATIENT
Start: 2021-07-23 | End: 2021-07-23 | Stop reason: HOSPADM

## 2021-07-23 RX ORDER — LIDOCAINE 40 MG/G
CREAM TOPICAL
Status: DISCONTINUED | OUTPATIENT
Start: 2021-07-23 | End: 2021-07-24

## 2021-07-23 RX ORDER — CITRIC ACID/SODIUM CITRATE 334-500MG
15 SOLUTION, ORAL ORAL
Status: DISCONTINUED | OUTPATIENT
Start: 2021-07-23 | End: 2021-07-23 | Stop reason: HOSPADM

## 2021-07-23 RX ORDER — METOCLOPRAMIDE HYDROCHLORIDE 5 MG/ML
10 INJECTION INTRAMUSCULAR; INTRAVENOUS EVERY 6 HOURS
Status: DISCONTINUED | OUTPATIENT
Start: 2021-07-23 | End: 2021-07-25 | Stop reason: HOSPADM

## 2021-07-23 RX ORDER — TRANEXAMIC ACID 10 MG/ML
1 INJECTION, SOLUTION INTRAVENOUS EVERY 30 MIN PRN
Status: DISCONTINUED | OUTPATIENT
Start: 2021-07-23 | End: 2021-07-23 | Stop reason: HOSPADM

## 2021-07-23 RX ORDER — FENTANYL CITRATE-0.9 % NACL/PF 10 MCG/ML
PLASTIC BAG, INJECTION (ML) INTRAVENOUS CONTINUOUS PRN
Status: DISCONTINUED | OUTPATIENT
Start: 2021-07-23 | End: 2021-07-23

## 2021-07-23 RX ORDER — OXYTOCIN 10 [USP'U]/ML
10 INJECTION, SOLUTION INTRAMUSCULAR; INTRAVENOUS
Status: DISCONTINUED | OUTPATIENT
Start: 2021-07-23 | End: 2021-07-23 | Stop reason: HOSPADM

## 2021-07-23 RX ORDER — AMOXICILLIN 250 MG
2 CAPSULE ORAL 2 TIMES DAILY
Status: DISCONTINUED | OUTPATIENT
Start: 2021-07-23 | End: 2021-07-25 | Stop reason: HOSPADM

## 2021-07-23 RX ORDER — METOCLOPRAMIDE 10 MG/1
10 TABLET ORAL EVERY 6 HOURS PRN
Status: DISCONTINUED | OUTPATIENT
Start: 2021-07-23 | End: 2021-07-25 | Stop reason: HOSPADM

## 2021-07-23 RX ORDER — NALBUPHINE HYDROCHLORIDE 10 MG/ML
2.5-5 INJECTION, SOLUTION INTRAMUSCULAR; INTRAVENOUS; SUBCUTANEOUS EVERY 6 HOURS PRN
Status: DISCONTINUED | OUTPATIENT
Start: 2021-07-23 | End: 2021-07-24

## 2021-07-23 RX ORDER — MISOPROSTOL 200 UG/1
400 TABLET ORAL
Status: DISCONTINUED | OUTPATIENT
Start: 2021-07-23 | End: 2021-07-25 | Stop reason: HOSPADM

## 2021-07-23 RX ORDER — OXYTOCIN 10 [USP'U]/ML
10 INJECTION, SOLUTION INTRAMUSCULAR; INTRAVENOUS
Status: DISCONTINUED | OUTPATIENT
Start: 2021-07-23 | End: 2021-07-25 | Stop reason: HOSPADM

## 2021-07-23 RX ORDER — BUPIVACAINE HYDROCHLORIDE 7.5 MG/ML
INJECTION, SOLUTION INTRASPINAL
Status: COMPLETED | OUTPATIENT
Start: 2021-07-23 | End: 2021-07-23

## 2021-07-23 RX ORDER — TRANEXAMIC ACID 10 MG/ML
1 INJECTION, SOLUTION INTRAVENOUS EVERY 30 MIN PRN
Status: DISCONTINUED | OUTPATIENT
Start: 2021-07-23 | End: 2021-07-25 | Stop reason: HOSPADM

## 2021-07-23 RX ORDER — DIPHENHYDRAMINE HCL 25 MG
25 CAPSULE ORAL EVERY 6 HOURS PRN
Status: DISCONTINUED | OUTPATIENT
Start: 2021-07-23 | End: 2021-07-25 | Stop reason: HOSPADM

## 2021-07-23 RX ORDER — DEXTROSE, SODIUM CHLORIDE, SODIUM LACTATE, POTASSIUM CHLORIDE, AND CALCIUM CHLORIDE 5; .6; .31; .03; .02 G/100ML; G/100ML; G/100ML; G/100ML; G/100ML
INJECTION, SOLUTION INTRAVENOUS CONTINUOUS
Status: DISCONTINUED | OUTPATIENT
Start: 2021-07-23 | End: 2021-07-25 | Stop reason: HOSPADM

## 2021-07-23 RX ORDER — CEFAZOLIN SODIUM 2 G/100ML
2 INJECTION, SOLUTION INTRAVENOUS
Status: COMPLETED | OUTPATIENT
Start: 2021-07-23 | End: 2021-07-23

## 2021-07-23 RX ORDER — ONDANSETRON 2 MG/ML
INJECTION INTRAMUSCULAR; INTRAVENOUS PRN
Status: DISCONTINUED | OUTPATIENT
Start: 2021-07-23 | End: 2021-07-23

## 2021-07-23 RX ORDER — LIDOCAINE 40 MG/G
CREAM TOPICAL
Status: DISCONTINUED | OUTPATIENT
Start: 2021-07-23 | End: 2021-07-25 | Stop reason: HOSPADM

## 2021-07-23 RX ORDER — KETOROLAC TROMETHAMINE 30 MG/ML
INJECTION, SOLUTION INTRAMUSCULAR; INTRAVENOUS PRN
Status: DISCONTINUED | OUTPATIENT
Start: 2021-07-23 | End: 2021-07-23

## 2021-07-23 RX ORDER — OXYTOCIN/0.9 % SODIUM CHLORIDE 30/500 ML
340 PLASTIC BAG, INJECTION (ML) INTRAVENOUS CONTINUOUS PRN
Status: DISCONTINUED | OUTPATIENT
Start: 2021-07-23 | End: 2021-07-25 | Stop reason: HOSPADM

## 2021-07-23 RX ORDER — ONDANSETRON 2 MG/ML
4 INJECTION INTRAMUSCULAR; INTRAVENOUS EVERY 30 MIN PRN
Status: DISCONTINUED | OUTPATIENT
Start: 2021-07-23 | End: 2021-07-23 | Stop reason: HOSPADM

## 2021-07-23 RX ORDER — MODIFIED LANOLIN
OINTMENT (GRAM) TOPICAL
Status: DISCONTINUED | OUTPATIENT
Start: 2021-07-23 | End: 2021-07-25 | Stop reason: HOSPADM

## 2021-07-23 RX ORDER — ONDANSETRON 4 MG/1
4 TABLET, ORALLY DISINTEGRATING ORAL EVERY 30 MIN PRN
Status: DISCONTINUED | OUTPATIENT
Start: 2021-07-23 | End: 2021-07-23 | Stop reason: HOSPADM

## 2021-07-23 RX ORDER — METHYLERGONOVINE MALEATE 0.2 MG/ML
200 INJECTION INTRAVENOUS
Status: DISCONTINUED | OUTPATIENT
Start: 2021-07-23 | End: 2021-07-25 | Stop reason: HOSPADM

## 2021-07-23 RX ORDER — OXYTOCIN/0.9 % SODIUM CHLORIDE 30/500 ML
100-340 PLASTIC BAG, INJECTION (ML) INTRAVENOUS CONTINUOUS PRN
Status: DISCONTINUED | OUTPATIENT
Start: 2021-07-23 | End: 2021-07-24

## 2021-07-23 RX ORDER — MISOPROSTOL 200 UG/1
800 TABLET ORAL
Status: DISCONTINUED | OUTPATIENT
Start: 2021-07-23 | End: 2021-07-25 | Stop reason: HOSPADM

## 2021-07-23 RX ORDER — AMOXICILLIN 250 MG
1 CAPSULE ORAL 2 TIMES DAILY
Status: DISCONTINUED | OUTPATIENT
Start: 2021-07-23 | End: 2021-07-25 | Stop reason: HOSPADM

## 2021-07-23 RX ADMIN — ONDANSETRON 4 MG: 2 INJECTION INTRAMUSCULAR; INTRAVENOUS at 19:28

## 2021-07-23 RX ADMIN — SODIUM CHLORIDE, POTASSIUM CHLORIDE, SODIUM LACTATE AND CALCIUM CHLORIDE: 600; 310; 30; 20 INJECTION, SOLUTION INTRAVENOUS at 18:57

## 2021-07-23 RX ADMIN — METOCLOPRAMIDE HYDROCHLORIDE 10 MG: 5 INJECTION INTRAMUSCULAR; INTRAVENOUS at 14:51

## 2021-07-23 RX ADMIN — MORPHINE SULFATE 0.15 MG: 1 INJECTION EPIDURAL; INTRATHECAL; INTRAVENOUS at 19:30

## 2021-07-23 RX ADMIN — Medication 100 ML/HR: at 21:21

## 2021-07-23 RX ADMIN — ACETAMINOPHEN 975 MG: 325 TABLET, FILM COATED ORAL at 17:23

## 2021-07-23 RX ADMIN — SODIUM CHLORIDE, POTASSIUM CHLORIDE, SODIUM LACTATE AND CALCIUM CHLORIDE: 600; 310; 30; 20 INJECTION, SOLUTION INTRAVENOUS at 19:13

## 2021-07-23 RX ADMIN — Medication 100 ML/HR: at 22:50

## 2021-07-23 RX ADMIN — Medication 2 G: at 19:16

## 2021-07-23 RX ADMIN — FENTANYL CITRATE 10 MCG: 50 INJECTION, SOLUTION INTRAMUSCULAR; INTRAVENOUS at 19:30

## 2021-07-23 RX ADMIN — KETOROLAC TROMETHAMINE 30 MG: 30 INJECTION, SOLUTION INTRAMUSCULAR at 20:26

## 2021-07-23 RX ADMIN — FAMOTIDINE 20 MG: 20 INJECTION, SOLUTION INTRAVENOUS at 16:00

## 2021-07-23 RX ADMIN — PHENYLEPHRINE HYDROCHLORIDE 100 MCG: 10 INJECTION INTRAVENOUS at 19:24

## 2021-07-23 RX ADMIN — ONDANSETRON 4 MG: 4 TABLET, ORALLY DISINTEGRATING ORAL at 15:14

## 2021-07-23 RX ADMIN — SODIUM CHLORIDE, POTASSIUM CHLORIDE, SODIUM LACTATE AND CALCIUM CHLORIDE: 600; 310; 30; 20 INJECTION, SOLUTION INTRAVENOUS at 14:57

## 2021-07-23 RX ADMIN — OXYTOCIN-SODIUM CHLORIDE 0.9% IV SOLN 30 UNIT/500ML 300 ML/HR: 30-0.9/5 SOLUTION at 19:43

## 2021-07-23 RX ADMIN — SODIUM CHLORIDE, POTASSIUM CHLORIDE, SODIUM LACTATE AND CALCIUM CHLORIDE 500 ML: 600; 310; 30; 20 INJECTION, SOLUTION INTRAVENOUS at 14:58

## 2021-07-23 RX ADMIN — BUPIVACAINE HYDROCHLORIDE IN DEXTROSE 1.6 ML: 7.5 INJECTION, SOLUTION SUBARACHNOID at 19:30

## 2021-07-23 RX ADMIN — Medication 50 MCG/MIN: at 19:22

## 2021-07-23 RX ADMIN — PHENYLEPHRINE HYDROCHLORIDE 100 MCG: 10 INJECTION INTRAVENOUS at 19:27

## 2021-07-23 ASSESSMENT — MIFFLIN-ST. JEOR: SCORE: 1569.57

## 2021-07-23 NOTE — H&P
History and Physical     Juventino Dickerson MRN# [id number]   YOB: 1990 Age: 30 year old      Date of Admission: 2021    Primary care provider: [facility]      Assessment and Plan:       30 year old  at 39w0d by 8w4d US here initially for IOL for pulmonary sarcoidosis and suspected LGA. Given her history of shoulder dystocia for >1 min, reviewed increased risk for recurrent shoulder dystocia with suspected LGA infant. Reviewed risk of shoulder dystocia including injury to baby (brachial plexus, clavicular or humeral fracture, hypoxic brain injury or even death) as well as risk to mom (increased risk of tearing and risk of episiotomy). Recommendation was made to proceed with CS delivery. Reviewed procedure, alternatives, and risks (bleeding, infection, injury to other tissues/organs) with Juventino and her mother Odalys. Questions answered and patient and family left to discuss together. Upon return to the room Juventino reports that she would like to proceed with CS delivery. Unfortunately, she had just eaten at 11:30 when I arrived in the room. Reviewed that since this is not an urgent procedure, anesthesia will require waiting >6 hours to perform procedure. She expresses understanding and is comfortable with this plan.     #  section  # H/o shoulder dystocia  # Suspected LGA infant  # H/o PPH  - Procedure, benefits, risks reviewed and consent signed  - Pre-op orders in, Ancef ppx  - Anesthesia consulted  - PPH: increased risk due to LGA infant and h/o PPH of 1.1 L. Will have 2 large bore IVs placed and uterotonics at bedside. No contraindications to uterotonics  - To OR this afternoon at 17:30     # Pulmonary sarcoidosis   - Followed by Rheumatology and M  - No steroids since 2021    # PNC  - Rh pos, Rubella immune, GCT declined, GBS neg   - Other prenatal labs wnl  - Imaging: US  at 36w0d w/ EFW 94%, AC >99%, placenta anterior      # FWB:   - Cat I  tracing, reactive; cephalic by Leopold's; EFW 8# by Leopold's  - Intrauterine resuscitative measures prn      Patient discussed with Dr. Kumari.    Kayce Clark MD  OB/GYN Resident PGY-2  12:19 PM 2021      [facility] staff:  Appreciate note by Dr. Clark.  I have seen and examined the patient without the resident. I have reviewed, edited, and agree with the note.      Debbie Kumari MD, FACOG  5:10 PM  2021              HPI:     Juventino Dickerson is a 30 year old  at 39w0d by LMP c/w 8w4d US who presents today for IOL for pulmonary sarcoidosis and suspected LGA infant. She reports good fetal movement. Denies LOF, vaginal bleeding, or painful/regular contractions. No other concerns or issues at this time.      Her previous pregnancies were uncomplicated, denies any h/o GDM. Her deliveries have been complicated by a shoulder dystocia lasting >1 minute requiring multiple maneuvers including Inés, suprapubic pressure, attempted delivery of posterior arm, attempted Johnson screw, and finally RML episiotomy with delivery of the posterior arm. Both of her deliveries were complicated by PPH, most recently of 1.1 L in 2018. No history of asthma, high blood pressure, or pre-eclampsia.    Pregnancy notable for:   Pulmonary sarcoidosis  Suspected LGA infant - EFW 94%ile, AC >99%ile on US 7/  H/o shoulder dystocia >1 min  H/o PPH     OB History:   OB History    Para Term  AB Living   4 2 2 0 1 2   SAB TAB Ectopic Multiple Live Births   1 0 0 0 2      # Outcome Date GA Lbr Paulino/2nd Weight Sex Delivery Anes PTL Lv   4 Current            3 Term 18 41w4d  4.518 kg (9 lb 15.4 oz) M Vag-Spont   ALLEGRA      Complications: Shoulder Dystocia, Hemorrhage   2 SAB 2013 6w0d          1 Term 12/15/11 39w3d   F Vag-Spont   ALLEGRA      Complications: Postpartum hemorrhage        Prenatal Lab Results:  ABO - O   Rh - pos  Ab - neg  HepB - NR  HepC - NR  HIV - NR  GBS - neg               Past Medical History:     Past Medical History:   Diagnosis Date     Depressive disorder      History of shoulder dystocia in prior pregnancy      Postpartum depression      Pulmonary sarcoidosis (H)    Denies any h/o HTN or asthma.           Past Surgical History:     Past Surgical History:   Procedure Laterality Date     BRONCHOSCOPY RIDID OR FLEXIBLE W/ENDOBRONCHIAL ULTRASOUND GUIDED 3 OR MORE NODE STATIONS N/A 11/24/2020    Procedure: BRONCHOSCOPY, WITH BIOPSY OF 3 OR MORE LYMPH NODE STATIONS WITH ENDOBRONCHIAL ULTRASOUND GUIDANCE;  Surgeon: Kevin Kramer MD;  [facility]   No prior abdominal surgeries.          Social History:     Denies any tobacco use, alcohol use, or drug use.           Family History:     Family History   Problem Relation Age of Onset     Diabetes Father    No known family history of bleeding or clotting disorder.           Immunizations:     Immunization History   Administered Date(s) Administered     DTAP (<7y) 10/13/1993     FLU 6-35 months 10/20/2009, 10/06/2010, 09/29/2011, 11/16/2017     Flu, Unspecified 10/06/2010     HPV Quadrivalent 10/20/2009, 10/06/2010, 01/19/2012     HepA-ped 2 Dose 10/22/2009, 10/06/2010     Influenza Vaccine IM > 6 months Valent IIV4 10/20/2009, 10/06/2010, 09/29/2011, 12/31/2014, 11/16/2017     Meningococcal (Menactra ) 10/20/2009     Tdap (Adacel,Boostrix) 10/22/2009, 03/29/2018            Allergies:   NKDA           Medications:     Medications Prior to Admission   Medication Sig Dispense Refill Last Dose     Prenatal Vit-Fe Fumarate-FA (PRENATAL MULTIVITAMIN W/IRON) 27-0.8 MG tablet Take 1 tablet by mouth daily 30 tablet 0 7/22/2021 at Unknown time     acetaminophen (TYLENOL) 500 MG tablet Take 1-2 tablets (500-1,000 mg) by mouth every 6 hours as needed for mild pain 90 tablet 0      albuterol (PROAIR HFA/PROVENTIL HFA/VENTOLIN HFA) 108 (90 Base) MCG/ACT inhaler Inhale 2 puffs into the lungs every 6 hours as needed for shortness of  breath / dyspnea or wheezing (Patient not taking: Reported on 7/2/2021) 1 Inhaler 0      aspirin (ASA) 81 MG chewable tablet Take 1 tablet (81 mg) by mouth daily (Patient not taking: Reported on 7/7/2021) 90 tablet 2      fluticasone (FLONASE) 50 MCG/ACT nasal spray Spray 1 spray into both nostrils daily (Patient not taking: Reported on 3/19/2021) 11.1 mL 0      ibuprofen (ADVIL/MOTRIN) 600 MG tablet Take 1 tablet (600 mg) by mouth every 6 hours as needed for moderate pain 90 tablet 0      SENNA-docusate sodium (SENNA S) 8.6-50 MG tablet Take 1 tablet by mouth 2 times daily as needed (constipation) 90 tablet 0      vitamin C (ASCORBIC ACID) 250 MG tablet Take 1 tablet (250 mg) by mouth daily (Patient not taking: Reported on 7/20/2021) 90 tablet 3              Review of Systems & Physical Exam:     The Review of Systems is negative other than noted in the HPI      /56   Pulse 93   Temp 98.2  F (36.8  C) (Oral)   Resp 18   LMP 10/23/2020   Gen: Well appearing, NAD   CV: Warm and well perfused   Lungs: Breathing comfortably on room air  Abd: Gravid, non-tender, non-distended  Ext: Trace peripheral extremity edema    Cervix: Deferred  Membranes: Intact  Presentation: Cephalic by Leopold's.  Estimated Fetal Weight: 8# by Leopold's    FHT:  Monitoring External  FHT: Baseline 130 bpm; moderate variability; accels present; no decelerations  TOCO: rare contractions          Data:   CBC, T&S, and RPR pending     COVID neg

## 2021-07-23 NOTE — ANESTHESIA PREPROCEDURE EVALUATION
Anesthesia Pre-Procedure Evaluation    Patient: Juventino Dickerson   MRN: [id number] : 1990        Preoperative Diagnosis: * No pre-op diagnosis entered *   Procedure : Procedure(s):   SECTION     Past Medical History:   Diagnosis Date     Depressive disorder      History of shoulder dystocia in prior pregnancy      Postpartum depression      Pulmonary sarcoidosis (H)       Past Surgical History:   Procedure Laterality Date     BRONCHOSCOPY RIDID OR FLEXIBLE W/ENDOBRONCHIAL ULTRASOUND GUIDED 3 OR MORE NODE STATIONS N/A 2020    Procedure: BRONCHOSCOPY, WITH BIOPSY OF 3 OR MORE LYMPH NODE STATIONS WITH ENDOBRONCHIAL ULTRASOUND GUIDANCE;  Surgeon: Kevin Kramer MD;  Location: [facility]      No Known Allergies   Social History     Tobacco Use     Smoking status: Never Smoker     Smokeless tobacco: Never Used   Substance Use Topics     Alcohol use: Not Currently      Wt Readings from Last 1 Encounters:   21 90.9 kg (200 lb 4.8 oz)        Anesthesia Evaluation   Pt has had prior anesthetic.     No history of anesthetic complications       ROS/MED HX  ENT/Pulmonary: Comment: Pulmonary Sarcoidosis- baseline cough, not on steroids since       Neurologic:  - neg neurologic ROS     Cardiovascular:  - neg cardiovascular ROS     METS/Exercise Tolerance:     Hematologic:     (+) anemia,     Musculoskeletal:  - neg musculoskeletal ROS     GI/Hepatic:  - neg GI/hepatic ROS     Renal/Genitourinary:  - neg Renal ROS     Endo:  - neg endo ROS     Psychiatric/Substance Use:  - neg psychiatric ROS     Infectious Disease:  - neg infectious disease ROS     Malignancy:  - neg malignancy ROS     Other: Comment: 30 year old  at 39w0d by 8w4d US with pulmonary sarcoidosis and suspected LGA. H/o shoulder dystocia in previous pregnancy.              OUTSIDE LABS:  CBC:   Lab Results   Component Value Date    WBC 7.7 2021    WBC 7.2 2021    HGB 10.7 (L) 2021    HGB 11.0 (L) 2021     HCT 31.7 (L) 07/03/2021    HCT 31.9 (L) 03/17/2021     07/03/2021     03/17/2021     BMP:   Lab Results   Component Value Date     03/17/2021     11/25/2020    POTASSIUM 3.4 03/17/2021    POTASSIUM 3.6 11/25/2020    CHLORIDE 107 03/17/2021    CHLORIDE 110 (H) 11/25/2020    CO2 22 03/17/2021    CO2 21 11/25/2020    BUN 5 (L) 03/17/2021    BUN 7 11/25/2020    CR 0.52 03/17/2021    CR 0.68 11/25/2020     (H) 07/03/2021     (H) 03/17/2021     COAGS:   Lab Results   Component Value Date    PTT 33 11/23/2020    INR 1.08 11/25/2020    FIBR 472 (H) 11/25/2020     POC:   Lab Results   Component Value Date    HCGS Positive (A) 11/20/2020     HEPATIC:   Lab Results   Component Value Date    ALBUMIN 4.0 11/23/2020    PROTTOTAL 8.0 11/23/2020    ALT 13 03/17/2021    AST 9 03/17/2021    ALKPHOS 75 11/23/2020    BILITOTAL 0.5 11/23/2020     OTHER:   Lab Results   Component Value Date    A1C 4.6 07/03/2021    ANTON 8.9 03/17/2021    PHOS 3.1 11/25/2020    MAG 2.0 11/25/2020    CRP 12.9 (H) 03/17/2021    SED 41 (H) 12/01/2020       Anesthesia Plan    ASA Status:  2   NPO Status:  Will be NPO Appropriate at ... 7/23/2021 5:30 PM   Anesthesia Type: Spinal.   Induction: N/a.   Maintenance: N/A.        Consents    Anesthesia Plan(s) and associated risks, benefits, and realistic alternatives discussed. Questions answered and patient/representative(s) expressed understanding.     - Discussed with:  Patient      - Extended Intubation/Ventilatory Support Discussed: No.      - Patient is DNR/DNI Status: No    Use of blood products discussed: Yes.     - Discussed with: Patient.     - Consented: consented to blood products            Reason for refusal: other.     Postoperative Care    Pain management: Neuraxial analgesia, Peripheral nerve block (Single Shot).        Comments:                Mayda Jones MD

## 2021-07-23 NOTE — PLAN OF CARE
Juventino Dickerson presents for induction of labor for history of PPH and shoulder dystocia x2. Patient denies contractions, bleeding or ROM.      Dr Clark and Dr Kumari notified of patient's arrival and condition.   Oriented patient to surroundings. Call light within reach.     Plan: Provider to assess for plan.

## 2021-07-24 LAB
CREAT SERPL-MCNC: 0.6 MG/DL (ref 0.52–1.04)
GFR SERPL CREATININE-BSD FRML MDRD: >90 ML/MIN/1.73M2
HGB BLD-MCNC: 9.7 G/DL (ref 11.7–15.7)
T PALLIDUM AB SER QL: NONREACTIVE

## 2021-07-24 PROCEDURE — 36415 COLL VENOUS BLD VENIPUNCTURE: CPT | Performed by: STUDENT IN AN ORGANIZED HEALTH CARE EDUCATION/TRAINING PROGRAM

## 2021-07-24 PROCEDURE — 250N000013 HC RX MED GY IP 250 OP 250 PS 637: Performed by: STUDENT IN AN ORGANIZED HEALTH CARE EDUCATION/TRAINING PROGRAM

## 2021-07-24 PROCEDURE — 250N000011 HC RX IP 250 OP 636: Performed by: STUDENT IN AN ORGANIZED HEALTH CARE EDUCATION/TRAINING PROGRAM

## 2021-07-24 PROCEDURE — 82565 ASSAY OF CREATININE: CPT | Performed by: STUDENT IN AN ORGANIZED HEALTH CARE EDUCATION/TRAINING PROGRAM

## 2021-07-24 PROCEDURE — 120N000002 HC R&B MED SURG/OB UMMC

## 2021-07-24 PROCEDURE — 85018 HEMOGLOBIN: CPT | Performed by: STUDENT IN AN ORGANIZED HEALTH CARE EDUCATION/TRAINING PROGRAM

## 2021-07-24 PROCEDURE — 250N000011 HC RX IP 250 OP 636: Performed by: ANESTHESIOLOGY

## 2021-07-24 RX ORDER — PRENATAL VIT/IRON FUM/FOLIC AC 27MG-0.8MG
1 TABLET ORAL DAILY
Status: DISCONTINUED | OUTPATIENT
Start: 2021-07-24 | End: 2021-07-25 | Stop reason: HOSPADM

## 2021-07-24 RX ADMIN — DOCUSATE SODIUM AND SENNOSIDES 1 TABLET: 8.6; 5 TABLET ORAL at 00:03

## 2021-07-24 RX ADMIN — MISOPROSTOL 400 MCG: 200 TABLET ORAL at 06:47

## 2021-07-24 RX ADMIN — ACETAMINOPHEN 975 MG: 325 TABLET, FILM COATED ORAL at 05:59

## 2021-07-24 RX ADMIN — ACETAMINOPHEN 975 MG: 325 TABLET, FILM COATED ORAL at 00:04

## 2021-07-24 RX ADMIN — ACETAMINOPHEN 975 MG: 325 TABLET, FILM COATED ORAL at 23:15

## 2021-07-24 RX ADMIN — KETOROLAC TROMETHAMINE 30 MG: 30 INJECTION, SOLUTION INTRAMUSCULAR; INTRAVENOUS at 11:33

## 2021-07-24 RX ADMIN — DOCUSATE SODIUM AND SENNOSIDES 2 TABLET: 8.6; 5 TABLET ORAL at 07:32

## 2021-07-24 RX ADMIN — METOCLOPRAMIDE HYDROCHLORIDE 10 MG: 5 INJECTION INTRAMUSCULAR; INTRAVENOUS at 00:04

## 2021-07-24 RX ADMIN — METOCLOPRAMIDE HYDROCHLORIDE 10 MG: 5 INJECTION INTRAMUSCULAR; INTRAVENOUS at 05:59

## 2021-07-24 RX ADMIN — IBUPROFEN 800 MG: 800 TABLET, FILM COATED ORAL at 23:15

## 2021-07-24 RX ADMIN — FAMOTIDINE 20 MG: 20 INJECTION, SOLUTION INTRAVENOUS at 20:13

## 2021-07-24 RX ADMIN — KETOROLAC TROMETHAMINE 30 MG: 30 INJECTION, SOLUTION INTRAMUSCULAR; INTRAVENOUS at 05:59

## 2021-07-24 RX ADMIN — PRENATAL VITAMINS-IRON FUMARATE 27 MG IRON-FOLIC ACID 0.8 MG TABLET 1 TABLET: at 07:33

## 2021-07-24 RX ADMIN — ACETAMINOPHEN 975 MG: 325 TABLET, FILM COATED ORAL at 11:33

## 2021-07-24 RX ADMIN — ACETAMINOPHEN 975 MG: 325 TABLET, FILM COATED ORAL at 17:30

## 2021-07-24 RX ADMIN — IBUPROFEN 800 MG: 800 TABLET, FILM COATED ORAL at 17:30

## 2021-07-24 RX ADMIN — ENOXAPARIN SODIUM 40 MG: 40 INJECTION SUBCUTANEOUS at 08:29

## 2021-07-24 RX ADMIN — KETOROLAC TROMETHAMINE 30 MG: 30 INJECTION, SOLUTION INTRAMUSCULAR; INTRAVENOUS at 00:03

## 2021-07-24 RX ADMIN — DOCUSATE SODIUM AND SENNOSIDES 2 TABLET: 8.6; 5 TABLET ORAL at 19:48

## 2021-07-24 NOTE — PLAN OF CARE
Data: Juventino Dickerson transferred to [id number] via wheelchair at 2305. Baby transferred via parent's arms.  Action: Receiving unit notified of transfer: Yes. Patient and family notified of room change. Report given to PRISCILLA Pierre at 2315. Belongings sent to receiving unit. Accompanied by Registered Nurse. Oriented patient to surroundings. Call light within reach. ID bands double-checked with receiving RN.  Response: Patient tolerated transfer and is stable. Per Dr. Kumari, pt to have gagnon catheter in for 12 hours.

## 2021-07-24 NOTE — PLAN OF CARE
Delivered viable Female via  section by Dr. Kumari and Dr. Dillon. To warmer, stimulated and dried by NICU team. Brought to mother after bundling for bonding.

## 2021-07-24 NOTE — DISCHARGE SUMMARY
DELIVERY DISCHARGE SUMMARY    Juventino Dickerson  : 1990  MRN: [id number]    Admit date: 2021  Discharge date: 2021    Admit Dx:   - 30 year old  at 39w0d  - History of shoulder dystocia  - Suspected fetal macrosomia  - Sarcoidosis   - History of postpartum hemorrhage   -chronic anemia    Discharge Dx:  - Same as above, S/p primary low transverse  section  -acute on chronic anemia. Secondary to blood loss.     Procedures:  - Primary low transverse  section with double layer closure via Pfannenstiel incision  - Spinal analgesia    Admit HPI:  Ms. Juventino Dickerson is a 30 year old  at 39w0d who was admitted on 2021 for scheduled IOL. Given her history of shoulder dystocia for >1 min, reviewed increased risk for recurrent shoulder dystocia with suspected LGA infant. Reviewed risk of shoulder dystocia including injury to baby (brachial plexus, clavicular or humeral fracture, hypoxic brain injury or even death) as well as risk to mom (increased risk of tearing and risk of episiotomy). Recommendation was made to proceed with CS delivery.  The risks, benefits, and alternatives of  section were discussed with the patient, and she agreed to proceed.     Please see her admit H&P for full details of her PMH, PSH, Meds, Allergies and exam on admit.    Hospital course:  After discussion of risk and and benefits and signing informed consent the patient was taken to the operating room for primary  section.    Operative Findings:  1. No intraabdominal adhesions   2. Clear amniotic fluid  3. Liveborn female infant in cephalic presentation. Apgars 7 at 1 minute & 8 at 5 minutes. Weight 3660 g.  4. Normal uterus, fallopian tubes, and ovaries.     EBL from the delivery was 796 mL. Please see her  Section Operative Note for full details regarding her delivery.    Her postoperative course was uncomplicated . On POD#2, she was meeting all of her postpartum  goals and deemed stable for discharge. She was voiding without difficulty, tolerating a regular diet without nausea and vomiting, her pain was well controlled on oral pain medicines and her lochia was appropriate. Her hemoglobin prior to delivery was 10.4 and after delivery was 9.7. Her Rh status was positive and Rhogam was not indicated.    Discharge Medications:  Current Discharge Medication List      CONTINUE these medications which have NOT CHANGED    Details   Prenatal Vit-Fe Fumarate-FA (PRENATAL MULTIVITAMIN W/IRON) 27-0.8 MG tablet Take 1 tablet by mouth daily  Qty: 30 tablet, Refills: 0    Associated Diagnoses: Pregnancy test positive      acetaminophen (TYLENOL) 500 MG tablet Take 1-2 tablets (500-1,000 mg) by mouth every 6 hours as needed for mild pain  Qty: 90 tablet, Refills: 0    Associated Diagnoses: HRP (high risk pregnancy), third trimester      albuterol (PROAIR HFA/PROVENTIL HFA/VENTOLIN HFA) 108 (90 Base) MCG/ACT inhaler Inhale 2 puffs into the lungs every 6 hours as needed for shortness of breath / dyspnea or wheezing  Qty: 1 Inhaler, Refills: 0      aspirin (ASA) 81 MG chewable tablet Take 1 tablet (81 mg) by mouth daily  Qty: 90 tablet, Refills: 2    Associated Diagnoses: Supervision of other high risk pregnancies, first trimester      fluticasone (FLONASE) 50 MCG/ACT nasal spray Spray 1 spray into both nostrils daily  Qty: 11.1 mL, Refills: 0      ibuprofen (ADVIL/MOTRIN) 600 MG tablet Take 1 tablet (600 mg) by mouth every 6 hours as needed for moderate pain  Qty: 90 tablet, Refills: 0    Associated Diagnoses: HRP (high risk pregnancy), third trimester      SENNA-docusate sodium (SENNA S) 8.6-50 MG tablet Take 1 tablet by mouth 2 times daily as needed (constipation)  Qty: 90 tablet, Refills: 0    Associated Diagnoses: HRP (high risk pregnancy), third trimester      vitamin C (ASCORBIC ACID) 250 MG tablet Take 1 tablet (250 mg) by mouth daily  Qty: 90 tablet, Refills: 3    Associated  Diagnoses: Antepartum anemia               Discharge/Disposition:  Juventino Dickerson was discharged to home in stable condition with the following instructions/medications:  1) Call for temperature > 100.4, bright red vaginal bleeding >1 pad an hour x 2 hours, foul smelling vaginal discharge, pain not controlled by usual oral pain meds, persistent nausea and vomiting not controlled on medications, drainage or redness from incision site  2) She declined.  3) For feeding she decided to breastfeed.  4) She was instructed to follow-up with her primary OB in 6 weeks for a routine postpartum visit.  5) Discharge activity:  No heavy lifting >15 lbs or strenuous activity for 6 weeks, pelvic rest for 6 weeks, no driving or operating machinery while on narcotics.      Shreya Oliva MD  OB/GYN Resident, PGY-1    [facility] staff:  Appreciate note by Dr. Oliva.  I have seen and examined the patient without the resident. I have reviewed, edited, and agree with the note.      Debbie Kumari MD, FACOG  8/3/2021  2:48 PM

## 2021-07-24 NOTE — OP NOTE
[facility]  Full Operative Progress Note     Surgery Date:  2021    Surgeon:  Debbie Kumari MD     Assistants:  Ellen Dillon MD, PGY-3    Pre-op Diagnosis:    - Intrauterine pregnancy at 39w0d  - Suspected macrosomia  - History of shoulder dystocia       Post-op Diagnosis:    - Same   - Liveborn female infant     Procedure:  Primary low-transverse  section with double layer uterine closure via pfannenstiel incision    Anesthesia: Spinal    EBL:  796 mL     IVF:  800 mL crystalloid    UOP:  300 mL clear urine at the end of the case    Drains: Sierra Catheter     Specimens:  None    Complications: None apparent    Indications:   Juventino Dickerson is a 30 year old  at 39w0d admitted for planned IOL. Given her history of shoulder dystocia for >1 min, reviewed increased risk for recurrent shoulder dystocia with suspected LGA infant. Reviewed risk of shoulder dystocia including injury to baby (brachial plexus, clavicular or humeral fracture, hypoxic brain injury or even death) as well as risk to mom (increased risk of tearing and risk of episiotomy). Recommendation was made to proceed with CS delivery.  The risks, benefits, and alternatives of  section were discussed with the patient, and she agreed to proceed.     Findings:   1. No intraabdominal adhesions   2. Clear amniotic fluid  3. Liveborn female infant in cephalic presentation. Apgars 7 at 1 minute & 8 at 5 minutes. Weight 3660 g.  4. Normal uterus, fallopian tubes, and ovaries.       Procedure Details:   The patient was brought to the OR, where adequate spinal anesthesia was administered.  She was placed in the dorsal supine position with a slight leftward tilt. She was prepped and draped in the usual sterile fashion. A surgical time out was performed. A pfannenstiel skin incision was made with the scalpel, and carried down to the underlying fascia with sharp and blunt dissection. The fascia was incised in  the midline, and the incision was extended laterally bluntly. The rectus muscles were  in the midline, and the peritoneum was entered bluntly, and the opening was extended with digital pressure. The bladder blade was placed. A transverse hysterotomy was made with the scalpel in the lower uterine segment, and the incision was extended with digital pressure. The infant was noted to be in the cephalic position, and was delivered atraumatically. A loose nuchal cord x 2 was noted and easily reduced. The cord was doubly clamped and cut after 60 seconds and the infant was handed off to the awaiting nursery staff. A segment of cord was cut. The placenta was delivered with gentle traction on the umbilical cord and uterine massage. The uterus was exteriorized and cleared of all clots and debris. Uterine tone was noted to be good with 30 units of pitocin given through the running IV and uterine massage.  The hysterotomy was closed with a running locked suture of 0 Vicryl.  The hysterotomy was then imbricated using an 0 Monocryl suture. The hysterotomy was noted to be hemostatic. The posterior cul-de-sac was cleared of all clots and debris. The uterus was returned to the abdomen. The pericolic gutters were cleared of all clots and debris. The hysterotomy was reexamined and noted to be hemostatic. The fascia and rectus muscles were examined and areas of oozing were controlled with electrocautery. The fascia was closed with a running 0 Vicryl suture. The subcutaneous tissue was irrigated and areas of oozing were controlled with electrocautery. The subcutaneous tissue was less than 2 cm in thickness, and was therefore not closed. The skin was closed with 4-0 Monocryl and covered with a sterile dressing.    All sponge, needle, and instrument counts were correct. The patient tolerated the procedure well, and was transferred to recovery in stable condition. Dr. Kumari was present and scrubbed for the entirety of the  procedure.     Ellen Dillon MD  Obstetrics & Gynecology, PGY-3  07/23/2021 11:06 PM     Staff:  I was scrubbed and present for entire case and agree w/ above note.    Debbie Kumari MD

## 2021-07-24 NOTE — ANESTHESIA CARE TRANSFER NOTE
Patient: Juventino Dickerson    Procedure(s):   SECTION    Diagnosis: * No pre-op diagnosis entered *  Diagnosis Additional Information: No value filed.    Anesthesia Type:   Spinal     Note:    Oropharynx: oropharynx clear of all foreign objects and spontaneously breathing  Level of Consciousness: awake  Oxygen Supplementation: room air    Independent Airway: airway patency satisfactory and stable  Dentition: dentition unchanged  Vital Signs Stable: post-procedure vital signs reviewed and stable  Report to RN Given: handoff report given  Patient transferred to: PACU    Handoff Report: Identifed the Patient, Identified the Reponsible Provider, Reviewed the pertinent medical history, Discussed the surgical course, Reviewed Intra-OP anesthesia mangement and issues during anesthesia, Set expectations for post-procedure period and Allowed opportunity for questions and acknowledgement of understanding      Vitals:  Vitals Value Taken Time   BP     Temp     Pulse 87 21   Resp     SpO2 99 % 21   Vitals shown include unvalidated device data.    Electronically Signed By: ELMA Mcgee CRNA  2021  9:04 PM

## 2021-07-24 NOTE — PLAN OF CARE
Pt to PACU via cart.  VSS, temp 97.9, LR infusing by gravity to piv without complications. Denies pain and nausea.  I)IV to pump, compression to pneumoboots re-started.  A) Stable P) pt to inform RN if she experiences pain or nausea.  Post op cares provided.

## 2021-07-24 NOTE — PLAN OF CARE
VSS. Patient has been resting in bed since getting up to the unit overnight. She is breastfeeding baby independently every 2-4 hours overnight. Pain is well managed with scheduled medications. Bleeding is minimal and fundus has been firm and midline. Patient's Sierra catheter has stayed in place per MD order, and it will be due to be taken out this morning. No concerns at this time, will continue will current plan of care.

## 2021-07-24 NOTE — ANESTHESIA PROCEDURE NOTES
Intrathecal injection Procedure Note  Pre-Procedure   Staff -        Anesthesiologist:  Ingrid Alex MD       Performed By: anesthesiologist       Location: OR       Pre-Anesthestic Checklist: patient identified, IV checked, risks and benefits discussed, informed consent, monitors and equipment checked, pre-op evaluation, at physician/surgeon's request and post-op pain management  Timeout:       Correct Patient: Yes        Correct Procedure: Yes        Correct Site: Yes        Correct Position: Yes   Procedure Documentation  Procedure: intrathecal injection       Patient Position: sitting       Patient Prep/Sterile Barriers: sterile gloves, mask, patient draped       Skin prep: Chloraprep       Insertion Site: L3-4. (midline approach).       Needle Gauge: 25.        Needle Length (Inches): 3.5        Spinal Needle Type: Pencan       Introducer used       Introducer: 20 G       # of attempts: 1 and  # of redirects:  0    Assessment/Narrative         Paresthesias: Yes.       CSF fluid: clear.      Opening pressure was cmH2O while  Sitting.      Medication(s) Administered   0.75% Hyperbaric Bupivacaine (Intrathecal), 1.6 mL  Fentanyl PF (Intrathecal), 10 mcg  Morphine PF 1 mg/mL (Intrathecal), 0.15 mg  Medication Administration Time: 7/23/2021 7:30 PM

## 2021-07-24 NOTE — PROVIDER NOTIFICATION
07/24/21 1545   Provider Notification   Provider Name/Title G2 Pj   Method of Notification Electronic Page   Request Evaluate-Remote   Notification Reason Status Update   FYI- fundus is 2/U, lochia light, no clots

## 2021-07-24 NOTE — PROVIDER NOTIFICATION
07/24/21 0842   Provider Notification   Provider Name/Title G2 Dr. Oliva   Method of Notification Electronic Page   Request Evaluate-Remote   Notification Reason Medication Request  (tdap vaccine)

## 2021-07-24 NOTE — PROGRESS NOTES
"[facility] Obstetrics   Postpartum Progress Note    Name: Juventino Dickerson  : 1990  MRN: [id number]      S: Patient is resting comfortably.  Pain is improving and well controlled.  Bleeding is heavy today but not passing any clots.  Net yet passing flatus or voiding spontaneously.  Ambulating without dizziness.  Tolerating a regular diet without nausea/vomiting.  Denies fevers/chills, headache, CP, SOB.  She is breastfeeding.    O:  Patient Vitals for the past 24 hrs:   BP Temp Temp src Pulse Resp SpO2 Height Weight   21 0729 97/52 97.7  F (36.5  C) Oral 69 18 98 % -- --   21 0330 105/56 -- -- 78 18 98 % -- --   21 0231 105/55 -- -- 80 18 98 % -- --   21 0132 109/78 -- -- 80 18 98 % -- --   21 0036 116/73 -- -- 78 18 99 % -- --   21 2330 117/69 97.5  F (36.4  C) Oral 77 18 99 % -- --   21 2250 -- -- -- -- 18 -- -- --   21 103/59 -- -- 70 18 99 % -- --   21 109/63 -- -- 68 18 100 % -- --   21 103/69 -- -- 76 18 100 % -- --   21 116/77 -- -- 63 18 100 % -- --   21 100/66 -- -- 72 18 99 % -- --   21 108/57 -- -- 76 18 99 % -- --   21 2100 102/80 97.8  F (36.6  C) Oral 80 18 -- -- --   21 1100 -- -- -- -- -- -- 1.549 m (5' 1\") 90.7 kg (200 lb)   21 1011 102/56 98.2  F (36.8  C) Oral 93 18 -- -- --       Gen: NAD. Alert, oriented. Resting comfortably in bed.  CV: regular rate  Resp:  no increased work of breathing  Abd: Soft, appropriately tender, fundus firm at 2 cm above the umbilicus, non-distended  Incision: c/d/i, no erythema or induration, no active drainage,ABD bandage in place  Ext: no lower extremity edema bilaterally     Labs:   HGB  Recent Labs   Lab 21  0707 21  1217   HGB 9.7* 10.4*       A/P: Juventino Dickerson is a 30 year old  female, now POD#1 s/p PLTCS for history of shoulder dystocia with suspected fetal macrosomia .  Pregnancy was complicated by sarcoidosis.  " Stable in the postoperative period.      Postpartum Care  - Continue routine postoperative management  - Rh pos, Rubella immune  - Pain: Continue ibuprofen/Tylenol scheduled.  Roxicodone PRN for breakthrough.  - ABLA: HGB 10.4>  mL> AM Hgb pending, asymptomatic. Will discharge with PO Fe if Hgb <10.  - Incision: C/D/I   - FEN/GI: regular diet, stool softeners PRN  - Sierra to be removed today   - Contraception: Declined  - Breastfeeding  - Baby doing well    Pulmonary sarcoidosis   - Followed by Rheumatology and MFM  - No steroids since 1/2021    Dispo: anticipate discharge to home on POD#2-3.    Ellen Dillon MD  OBGYN PGY-2  07/24/2021 8:35 AM

## 2021-07-24 NOTE — PROVIDER NOTIFICATION
07/24/21 1150   Provider Notification   Provider Name/Title G1 Pj   Method of Notification Electronic Page   Request Evaluate in Person   Notification Reason Status Update   Pt was at 3/U, emptied bladder and went down to 1/U. Lochia light, no clots.

## 2021-07-24 NOTE — PLAN OF CARE
Patient arrived to [facility] unit via zoom cart at 2330 ,with belongings, accompanied by spouse/ significant other, with infant in arms. Received report from  Woody CHAUDHARI RN  and checked bands. Unit and room orientation started. Call light given and within arms reach; no concerns present at this time. Continue with plan of care.

## 2021-07-24 NOTE — BRIEF OP NOTE
[facility]  Brief Operative Progress Note     Surgery Date:  2021    Surgeon:  Debbie Kumari MD     Assistants:  Ellen Dillon MD, PGY-3    Pre-op Diagnosis:    - Intrauterine pregnancy at 39w0d  - Suspected macrosomia  - History of shoulder dystocia       Post-op Diagnosis:    - Same   - Liveborn female infant     Procedure:  Primary low-transverse  section with double layer uterine closure via pfannenstiel incision    Anesthesia: Spinal    EBL:  796 mL     IVF:  800 mL crystalloid    UOP:  300 mL clear urine at the end of the case    Drains: Sierra Catheter     Specimens:  None    Complications: None apparent    Indications:   Juventino Dickerson is a 30 year old  at 39w0d admitted for planned IOL. Given her history of shoulder dystocia for >1 min, reviewed increased risk for recurrent shoulder dystocia with suspected LGA infant. Reviewed risk of shoulder dystocia including injury to baby (brachial plexus, clavicular or humeral fracture, hypoxic brain injury or even death) as well as risk to mom (increased risk of tearing and risk of episiotomy). Recommendation was made to proceed with CS delivery.  The risks, benefits, and alternatives of  section were discussed with the patient, and she agreed to proceed.     Findings:   1. No intraabdominal adhesions   2. Clear amniotic fluid  3. Liveborn female infant in cephalic presentation. Apgars 7 at 1 minute & 8 at 5 minutes. Weight 3660 g.  4. Normal uterus, fallopian tubes, and ovaries.     Ellen Dillon MD  Obstetrics & Gynecology, PGY-3  2021 8:53 PM

## 2021-07-24 NOTE — PLAN OF CARE
Patient's postpartum assessments WDL, vital signs stable. Fundus firm and midline, lochia WDL. Incision dressing dry and intact. Sierra catheter was removed at 0700, voiding spontaneusly. Ambulates well in room.  Denies heart burn and nausea and declined pepcid infusion and reglan today.   Taking tylenol and toradol/ibuprofen for pain control, adequate per patient.   Bonding well with infant. Breastfeeds infant well on cue, latch-on verified.   EDS completed and patient scored 1.  Will continue to monitor and provide support.

## 2021-07-25 VITALS
SYSTOLIC BLOOD PRESSURE: 114 MMHG | HEIGHT: 61 IN | WEIGHT: 200 LBS | RESPIRATION RATE: 16 BRPM | HEART RATE: 85 BPM | DIASTOLIC BLOOD PRESSURE: 71 MMHG | TEMPERATURE: 98.1 F | OXYGEN SATURATION: 98 % | BODY MASS INDEX: 37.76 KG/M2

## 2021-07-25 PROCEDURE — 90715 TDAP VACCINE 7 YRS/> IM: CPT

## 2021-07-25 PROCEDURE — 250N000011 HC RX IP 250 OP 636: Performed by: STUDENT IN AN ORGANIZED HEALTH CARE EDUCATION/TRAINING PROGRAM

## 2021-07-25 PROCEDURE — 250N000011 HC RX IP 250 OP 636

## 2021-07-25 PROCEDURE — 250N000013 HC RX MED GY IP 250 OP 250 PS 637: Performed by: STUDENT IN AN ORGANIZED HEALTH CARE EDUCATION/TRAINING PROGRAM

## 2021-07-25 PROCEDURE — 90471 IMMUNIZATION ADMIN: CPT

## 2021-07-25 RX ORDER — IBUPROFEN 800 MG/1
800 TABLET, FILM COATED ORAL EVERY 6 HOURS
Qty: 40 TABLET | Refills: 0 | Status: SHIPPED | OUTPATIENT
Start: 2021-07-25 | End: 2024-05-21

## 2021-07-25 RX ORDER — ACETAMINOPHEN 325 MG/1
975 TABLET ORAL EVERY 6 HOURS
Qty: 50 TABLET | Refills: 0 | Status: SHIPPED | OUTPATIENT
Start: 2021-07-25

## 2021-07-25 RX ORDER — OXYCODONE HYDROCHLORIDE 5 MG/1
5 TABLET ORAL EVERY 4 HOURS PRN
Qty: 12 TABLET | Refills: 0 | Status: SHIPPED | OUTPATIENT
Start: 2021-07-25

## 2021-07-25 RX ORDER — FAMOTIDINE 10 MG
10 TABLET ORAL 2 TIMES DAILY
Status: DISCONTINUED | OUTPATIENT
Start: 2021-07-25 | End: 2021-07-25 | Stop reason: HOSPADM

## 2021-07-25 RX ADMIN — PRENATAL VITAMINS-IRON FUMARATE 27 MG IRON-FOLIC ACID 0.8 MG TABLET 1 TABLET: at 08:12

## 2021-07-25 RX ADMIN — ACETAMINOPHEN 975 MG: 325 TABLET, FILM COATED ORAL at 05:31

## 2021-07-25 RX ADMIN — ENOXAPARIN SODIUM 40 MG: 40 INJECTION SUBCUTANEOUS at 08:17

## 2021-07-25 RX ADMIN — OXYCODONE HYDROCHLORIDE 5 MG: 5 TABLET ORAL at 03:12

## 2021-07-25 RX ADMIN — IBUPROFEN 800 MG: 800 TABLET, FILM COATED ORAL at 05:31

## 2021-07-25 RX ADMIN — DOCUSATE SODIUM AND SENNOSIDES 1 TABLET: 8.6; 5 TABLET ORAL at 08:12

## 2021-07-25 RX ADMIN — ACETAMINOPHEN 975 MG: 325 TABLET, FILM COATED ORAL at 11:59

## 2021-07-25 RX ADMIN — CLOSTRIDIUM TETANI TOXOID ANTIGEN (FORMALDEHYDE INACTIVATED), CORYNEBACTERIUM DIPHTHERIAE TOXOID ANTIGEN (FORMALDEHYDE INACTIVATED), BORDETELLA PERTUSSIS TOXOID ANTIGEN (GLUTARALDEHYDE INACTIVATED), BORDETELLA PERTUSSIS FILAMENTOUS HEMAGGLUTININ ANTIGEN (FORMALDEHYDE INACTIVATED), BORDETELLA PERTUSSIS PERTACTIN ANTIGEN, AND BORDETELLA PERTUSSIS FIMBRIAE 2/3 ANTIGEN 0.5 ML: 5; 2; 2.5; 5; 3; 5 INJECTION, SUSPENSION INTRAMUSCULAR at 08:13

## 2021-07-25 RX ADMIN — IBUPROFEN 800 MG: 800 TABLET, FILM COATED ORAL at 12:00

## 2021-07-25 NOTE — PLAN OF CARE
VSS. Patient is ambulating independently and voiding without difficulty. Passing gas but has not had a bowel movement yet, stool softeners given. Patient showered this evening and took dressing off. Incision looks WNL with steri strips intact. Bleeding has been minimal overnight, no clots present. Fundus has been firm and midline, at 2/U to 1/U during this writer's shift. She is breastfeeding baby independently with occasional support from nursing staff. She reported some heartburn earlier in the evening, and was given IV Famotidine, see MAR. Reports medication was effective and denies any heartburn after medication administration. She has not reported any nausea during this shift. Will continue with the current plan of care.

## 2021-07-25 NOTE — PROGRESS NOTES
[facility] Obstetrics Post-Op / Progress Note         Assessment and Plan:    Assessment:   Post-operative day #2  Low transverse primary  section  L&D complications: H/o shoulder dystocia      Doing well.  Clean wound without signs of infection.  Normal healing wound.  No immediate surgical complications identified.  Pain well-controlled.  Post op acute on chronic blood loss anemia.        Plan:   Monitor wound for signs of infection  Pain control measures as needed  Discharge later today  Home on oral iron           Interval History:   Doing well.  Pain is well-controlled.  No fevers.  No history of wound drainage, warmth or significant erythema.  Good appetite.  Denies chest pain, shortness of breath, nausea or vomiting.  Ambulatory.  Breastfeeding well.          Significant Problems:   none          Review of Systems:    The patient denies any chest pain, shortness of breath, excessive pain, fever, chills, purulent drainage from the wound, nausea or vomiting.          Medications:   All medications related to the patient's surgery have been reviewed          Physical Exam:     All vitals stable  Patient Vitals for the past 12 hrs:   BP Temp Temp src Pulse   21 0314 116/77 97.4  F (36.3  C) Oral 82     Wound clean and dry with minimal or no drainage.  Surrounding skin with minimal erythema.          Data:     Hemoglobin   Date Value Ref Range Status   2021 9.7 (L) 11.7 - 15.7 g/dL Final   2021 10.4 (L) 11.7 - 15.7 g/dL Final   2021 10.7 (L) 11.7 - 15.7 g/dL Final   2021 11.0 (L) 11.7 - 15.7 g/dL Final   2020 12.2 11.7 - 15.7 g/dL Final   2020 11.5 (L) 11.7 - 15.7 g/dL Final   2020 11.9 11.7 - 15.7 g/dL Final       Debbie Kumari MD

## 2021-07-25 NOTE — PLAN OF CARE
Data: Vital signs and postpartum checks within normal limits - see flow record. Patient eating and drinking normally. Patient is voiding independently and is up ambulating in the room. Incision is in tact and no apparent signs of infection noted, but few of the sterile strips are off. Pt is breastfeeding well, but needs to improve on deeper latch. Nipples are in tact. Pumped once because she don't think she has enough for the baby, since baby is constantly on the breast.  Patient performing self cares and is able to care for infant.  Action:  Checked latch and assisted with a deeper latch. Encouraged pt to undressed the baby for feedings, so baby can be more awake. Patient medicated during the shift for pain control. Reviewed teaching and discharge instructions with pt. Checked ID band. Pt instructed to  discharge medications from [facility] close to home. Pt was given copies of the discharge papers, baby's discharge medication and the gift.   Response: Positive attachment behaviors observed with infant. Support persons  present.   Plan: Pt discharged home today with baby.

## 2021-07-26 NOTE — ANESTHESIA POSTPROCEDURE EVALUATION
Patient: Juventino Dickerson    Procedure(s):   SECTION    Diagnosis:* No pre-op diagnosis entered *  Diagnosis Additional Information: No value filed.    Anesthesia Type:  Spinal    Note:  Disposition: Inpatient   Postop Pain Control: Uneventful            Sign Out: Well controlled pain   PONV: No   Neuro/Psych: Uneventful            Sign Out: Acceptable/Baseline neuro status   Airway/Respiratory: Uneventful            Sign Out: Acceptable/Baseline resp. status   CV/Hemodynamics: Uneventful            Sign Out: Acceptable CV status   Other NRE: NONE   DID A NON-ROUTINE EVENT OCCUR? No         Delayed entry note patient seen on 21    Last vitals:  Vitals Value Taken Time   /65 21 2245   Temp     Pulse 75 21 2249   Resp 18 21 2250   SpO2 99 % 21   Vitals shown include unvalidated device data.    Electronically Signed By: Ingrid Alex MD  2021  10:12 PM

## 2021-10-02 ENCOUNTER — HEALTH MAINTENANCE LETTER (OUTPATIENT)
Age: 31
End: 2021-10-02

## 2021-10-22 ENCOUNTER — HOSPITAL ENCOUNTER (EMERGENCY)
Facility: CLINIC | Age: 31
Discharge: HOME | End: 2021-10-22
Attending: EMERGENCY MEDICINE | Admitting: EMERGENCY MEDICINE
Payer: MEDICARE

## 2021-10-22 ENCOUNTER — APPOINTMENT (OUTPATIENT)
Dept: CT IMAGING | Facility: CLINIC | Age: 31
End: 2021-10-22
Attending: EMERGENCY MEDICINE
Payer: MEDICARE

## 2021-10-22 VITALS
WEIGHT: 188 LBS | OXYGEN SATURATION: 99 % | TEMPERATURE: 98.7 F | DIASTOLIC BLOOD PRESSURE: 50 MMHG | SYSTOLIC BLOOD PRESSURE: 122 MMHG | RESPIRATION RATE: 16 BRPM | BODY MASS INDEX: 35.52 KG/M2 | HEART RATE: 84 BPM

## 2021-10-22 DIAGNOSIS — N39.0 ACUTE UTI: ICD-10-CM

## 2021-10-22 DIAGNOSIS — R10.84 ABDOMINAL PAIN, GENERALIZED: ICD-10-CM

## 2021-10-22 LAB
ALBUMIN SERPL-MCNC: 3.2 G/DL (ref 3.4–5)
ALBUMIN UR-MCNC: 50 MG/DL
ALP SERPL-CCNC: 65 U/L (ref 40–150)
ALT SERPL W P-5'-P-CCNC: 15 U/L (ref 0–50)
ANION GAP SERPL CALCULATED.3IONS-SCNC: 3 MMOL/L (ref 3–14)
APPEARANCE UR: ABNORMAL
AST SERPL W P-5'-P-CCNC: 12 U/L (ref 0–45)
BACTERIA #/AREA URNS HPF: ABNORMAL /HPF
BASOPHILS # BLD AUTO: 0 10E3/UL (ref 0–0.2)
BASOPHILS NFR BLD AUTO: 0 %
BILIRUB SERPL-MCNC: 0.3 MG/DL (ref 0.2–1.3)
BILIRUB UR QL STRIP: NEGATIVE
BUN SERPL-MCNC: 9 MG/DL (ref 7–30)
CALCIUM SERPL-MCNC: 8.8 MG/DL (ref 8.5–10.1)
CHLORIDE BLD-SCNC: 109 MMOL/L (ref 94–109)
CO2 SERPL-SCNC: 27 MMOL/L (ref 20–32)
COLOR UR AUTO: YELLOW
CREAT SERPL-MCNC: 0.83 MG/DL (ref 0.52–1.04)
EOSINOPHIL # BLD AUTO: 0.1 10E3/UL (ref 0–0.7)
EOSINOPHIL NFR BLD AUTO: 2 %
ERYTHROCYTE [DISTWIDTH] IN BLOOD BY AUTOMATED COUNT: 12.9 % (ref 10–15)
GFR SERPL CREATININE-BSD FRML MDRD: >90 ML/MIN/1.73M2
GLUCOSE BLD-MCNC: 85 MG/DL (ref 70–99)
GLUCOSE UR STRIP-MCNC: NEGATIVE MG/DL
HCG SERPL QL: NEGATIVE
HCT VFR BLD AUTO: 32.3 % (ref 35–47)
HGB BLD-MCNC: 10.6 G/DL (ref 11.7–15.7)
HGB UR QL STRIP: NEGATIVE
IMM GRANULOCYTES # BLD: 0 10E3/UL
IMM GRANULOCYTES NFR BLD: 0 %
KETONES UR STRIP-MCNC: NEGATIVE MG/DL
LEUKOCYTE ESTERASE UR QL STRIP: ABNORMAL
LIPASE SERPL-CCNC: 143 U/L (ref 73–393)
LYMPHOCYTES # BLD AUTO: 1.4 10E3/UL (ref 0.8–5.3)
LYMPHOCYTES NFR BLD AUTO: 21 %
MCH RBC QN AUTO: 30.7 PG (ref 26.5–33)
MCHC RBC AUTO-ENTMCNC: 32.8 G/DL (ref 31.5–36.5)
MCV RBC AUTO: 94 FL (ref 78–100)
MONOCYTES # BLD AUTO: 0.5 10E3/UL (ref 0–1.3)
MONOCYTES NFR BLD AUTO: 8 %
MUCOUS THREADS #/AREA URNS LPF: PRESENT /LPF
NEUTROPHILS # BLD AUTO: 4.6 10E3/UL (ref 1.6–8.3)
NEUTROPHILS NFR BLD AUTO: 69 %
NITRATE UR QL: NEGATIVE
NRBC # BLD AUTO: 0 10E3/UL
NRBC BLD AUTO-RTO: 0 /100
PH UR STRIP: 6.5 [PH] (ref 5–7)
PLATELET # BLD AUTO: 355 10E3/UL (ref 150–450)
POTASSIUM BLD-SCNC: 3.4 MMOL/L (ref 3.4–5.3)
PROT SERPL-MCNC: 7.4 G/DL (ref 6.8–8.8)
RBC # BLD AUTO: 3.45 10E6/UL (ref 3.8–5.2)
RBC URINE: 2 /HPF
SODIUM SERPL-SCNC: 139 MMOL/L (ref 133–144)
SP GR UR STRIP: 1.02 (ref 1–1.03)
SQUAMOUS EPITHELIAL: 26 /HPF
UROBILINOGEN UR STRIP-MCNC: 2 MG/DL
WBC # BLD AUTO: 6.7 10E3/UL (ref 4–11)
WBC URINE: 19 /HPF

## 2021-10-22 PROCEDURE — 999N000127 HC STATISTIC PERIPHERAL IV START W US GUIDANCE

## 2021-10-22 PROCEDURE — 250N000009 HC RX 250: Performed by: EMERGENCY MEDICINE

## 2021-10-22 PROCEDURE — 83690 ASSAY OF LIPASE: CPT | Performed by: EMERGENCY MEDICINE

## 2021-10-22 PROCEDURE — 74177 CT ABD & PELVIS W/CONTRAST: CPT | Mod: ME

## 2021-10-22 PROCEDURE — 250N000013 HC RX MED GY IP 250 OP 250 PS 637: Performed by: EMERGENCY MEDICINE

## 2021-10-22 PROCEDURE — 84703 CHORIONIC GONADOTROPIN ASSAY: CPT | Performed by: EMERGENCY MEDICINE

## 2021-10-22 PROCEDURE — 99285 EMERGENCY DEPT VISIT HI MDM: CPT | Mod: 25 | Performed by: EMERGENCY MEDICINE

## 2021-10-22 PROCEDURE — 81001 URINALYSIS AUTO W/SCOPE: CPT | Performed by: EMERGENCY MEDICINE

## 2021-10-22 PROCEDURE — 250N000011 HC RX IP 250 OP 636: Performed by: EMERGENCY MEDICINE

## 2021-10-22 PROCEDURE — 80053 COMPREHEN METABOLIC PANEL: CPT | Performed by: EMERGENCY MEDICINE

## 2021-10-22 PROCEDURE — 85025 COMPLETE CBC W/AUTO DIFF WBC: CPT | Performed by: EMERGENCY MEDICINE

## 2021-10-22 PROCEDURE — 36415 COLL VENOUS BLD VENIPUNCTURE: CPT | Performed by: EMERGENCY MEDICINE

## 2021-10-22 PROCEDURE — 99284 EMERGENCY DEPT VISIT MOD MDM: CPT | Performed by: EMERGENCY MEDICINE

## 2021-10-22 PROCEDURE — 999N000040 HC STATISTIC CONSULT NO CHARGE VASC ACCESS

## 2021-10-22 PROCEDURE — 87086 URINE CULTURE/COLONY COUNT: CPT | Performed by: EMERGENCY MEDICINE

## 2021-10-22 RX ORDER — CEPHALEXIN 500 MG/1
500 CAPSULE ORAL 2 TIMES DAILY
Qty: 6 CAPSULE | Refills: 0 | Status: SHIPPED | OUTPATIENT
Start: 2021-10-22 | End: 2021-10-25

## 2021-10-22 RX ORDER — IOPAMIDOL 755 MG/ML
100 INJECTION, SOLUTION INTRAVASCULAR ONCE
Status: COMPLETED | OUTPATIENT
Start: 2021-10-22 | End: 2021-10-22

## 2021-10-22 RX ORDER — CEPHALEXIN 500 MG/1
500 CAPSULE ORAL ONCE
Status: COMPLETED | OUTPATIENT
Start: 2021-10-22 | End: 2021-10-22

## 2021-10-22 RX ADMIN — IOPAMIDOL 92 ML: 755 INJECTION, SOLUTION INTRAVENOUS at 19:10

## 2021-10-22 RX ADMIN — SODIUM CHLORIDE 63 ML: 9 INJECTION, SOLUTION INTRAVENOUS at 19:11

## 2021-10-22 RX ADMIN — CEPHALEXIN 500 MG: 500 CAPSULE ORAL at 20:02

## 2021-10-22 NOTE — DISCHARGE INSTRUCTIONS
TODAY'S VISIT:  You were seen today for abdominal pain   -   - If you had any labs or imaging/radiology tests performed today, you should also discuss these tests with your usual provider.     FOLLOW-UP:  Please make an appointment to follow up with:  - Your Primary Care Provider. If you do not have a PCP, please call the Primary Care Center (phone: [phone] for an appointment    - Have your provider review the results from today's visit with you again to make sure no further follow-up or additional testing is needed based on those results.     RETURN TO THE EMERGENCY DEPARTMENT  Return to the Emergency Department at any time for any new or worsening symptoms or any concerns.

## 2021-10-22 NOTE — ED PROVIDER NOTES
History     Chief Complaint   Patient presents with     Abdominal Pain     reports has been having generalized abdominal pain for the past three days, sharp, constant, worsens when lying on her abdomen or with pressure, denies nausea or vomiting, last BM today normal, denies blood in stools,  three months ago     JOSE EDUARDO Dickersno is a 30 year old female with a past medical history of depression, pulmonary sarcoidosis, recent  who presents to the emergency department with a chief complaint of abdominal pain.  It has been present for the past 3 days.  It is sharp in nature.  It is generalized.  It has been constant.  It does not move around her abdomen.  It worsens when the patient is laying on her abdomen or applies pressure to the affected area.  No associated nausea or vomiting.  Patient reports her last bowel movement was today and was normal.  No constipation or diarrhea.  She denies any hematochezia.  She had a  3 months ago.  She states that her surgical pain had resolved since that time.  She states she has resumed her normal menstrual cycle.  No current vaginal bleeding or drainage.  No current urinary symptoms.  No fevers or chills    I have reviewed the Medications, Allergies, Past Medical and Surgical History, and Social History in the "[employer] system.    Past Medical History:   Diagnosis Date     Depressive disorder      History of shoulder dystocia in prior pregnancy      Postpartum depression      Pulmonary sarcoidosis (H)      Past Surgical History:   Procedure Laterality Date     BRONCHOSCOPY RIDID OR FLEXIBLE W/ENDOBRONCHIAL ULTRASOUND GUIDED 3 OR MORE NODE STATIONS N/A 2020    Procedure: BRONCHOSCOPY, WITH BIOPSY OF 3 OR MORE LYMPH NODE STATIONS WITH ENDOBRONCHIAL ULTRASOUND GUIDANCE;  Surgeon: Kevin Kramer MD;  Location: [facility]      SECTION N/A 2021    Procedure:  SECTION;  Surgeon: Debbie Kumari MD;  Location: [facility]     No  current facility-administered medications for this encounter.     Current Outpatient Medications   Medication     acetaminophen (TYLENOL) 325 MG tablet     acetaminophen (TYLENOL) 500 MG tablet     albuterol (PROAIR HFA/PROVENTIL HFA/VENTOLIN HFA) 108 (90 Base) MCG/ACT inhaler     fluticasone (FLONASE) 50 MCG/ACT nasal spray     ibuprofen (ADVIL/MOTRIN) 600 MG tablet     ibuprofen (ADVIL/MOTRIN) 800 MG tablet     oxyCODONE (ROXICODONE) 5 MG tablet     Prenatal Vit-Fe Fumarate-FA (PRENATAL MULTIVITAMIN W/IRON) 27-0.8 MG tablet     SENNA-docusate sodium (SENNA S) 8.6-50 MG tablet     vitamin C (ASCORBIC ACID) 250 MG tablet     No Known Allergies  Past medical history, past surgical history, medications, and allergies were reviewed with the patient. Additional pertinent items: None    Social History     Socioeconomic History     Marital status: Single     Spouse name: Not on file     Number of children: Not on file     Years of education: Not on file     Highest education level: Not on file   Occupational History     Occupation: Unemployed   Tobacco Use     Smoking status: Never Smoker     Smokeless tobacco: Never Used   Substance and Sexual Activity     Alcohol use: Not Currently     Drug use: Not Currently     Sexual activity: Yes     Partners: Male   Other Topics Concern     Parent/sibling w/ CABG, MI or angioplasty before 65F 55M? Not Asked   Social History Narrative     Not on file     Social Determinants of Health     Financial Resource Strain:      Difficulty of Paying Living Expenses:    Food Insecurity:      Worried About Running Out of Food in the Last Year:      Ran Out of Food in the Last Year:    Transportation Needs:      Lack of Transportation (Medical):      Lack of Transportation (Non-Medical):    Physical Activity:      Days of Exercise per Week:      Minutes of Exercise per Session:    Stress:      Feeling of Stress :    Social Connections:      Frequency of Communication with Friends and Family:       Frequency of Social Gatherings with Friends and Family:      Attends [employer] Services:      Active Member of Clubs or Organizations:      Attends Club or Organization Meetings:      Marital Status:    Intimate Partner Violence:      Fear of Current or Ex-Partner:      Emotionally Abused:      Physically Abused:      Sexually Abused:      Social history was reviewed with the patient. Additional pertinent items: None    Review of Systems  General: No fevers or chills  Skin: No rash or diaphoresis  Eyes: No eye redness or discharge  Ears/Nose/Throat: No rhinorrhea or nasal congestion  Respiratory: No cough or SOB  Cardiovascular: No chest pain or palpitations  Gastrointestinal: See HPI  Genitourinary: No urinary frequency, hematuria, or dysuria  Musculoskeletal: No arthralgias or myalgias  Neurologic: No numbness or weakness  Hematologic/Lymphatic/Immunologic: No leg swelling, no easy bruising/bleeding  Endocrine: No polyuria/polydypsia    A complete review of systems was performed with pertinent positives and negatives noted in the HPI, and all other systems negative.    Physical Exam   BP: 122/50  Pulse: 93  Temp: 98.7  F (37.1  C)  Resp: 15  Weight: 85.3 kg (188 lb)  SpO2: 98 %      General: Well nourished, well developed, NAD  HEENT: EOMI, anicteric. NCAT, MMM  Neck: no jugular venous distension, supple, nl ROM  Cardiac: Regular rate and rhythm. No murmurs, rubs, or gallops. Normal S1, S2.  Intact peripheral pulses  Pulm: CTAB, no stridor, wheezes, rales, rhonchi  Abd: Soft, diffusely tender to palpation without rebound or guarding, nondistended.  No masses palpated.  Skin: Warm and dry to the touch.  No rash, incision well-healed with minimal scar  Extremities: No LE edema, no cyanosis, w/w/p  Neuro: A&Ox3, no gross focal deficits    ED Course        Procedures                          Labs Ordered and Resulted from Time of ED Arrival Up to the Time of Departure from the ED   COMPREHENSIVE METABOLIC PANEL -  Abnormal; Notable for the following components:       Result Value    Albumin 3.2 (*)     All other components within normal limits   ROUTINE UA WITH MICROSCOPIC REFLEX TO CULTURE - Abnormal; Notable for the following components:    Appearance Urine Slightly Cloudy (*)     Protein Albumin Urine 50  (*)     Leukocyte Esterase Urine Moderate (*)     Bacteria Urine Few (*)     Mucus Urine Present (*)     WBC Urine 19 (*)     Squamous Epithelials Urine 26 (*)     All other components within normal limits    Narrative:     Urine Culture ordered based on laboratory criteria   CBC WITH PLATELETS AND DIFFERENTIAL - Abnormal; Notable for the following components:    RBC Count 3.45 (*)     Hemoglobin 10.6 (*)     Hematocrit 32.3 (*)     All other components within normal limits   LIPASE - Normal   HCG QUALITATIVE PREGNANCY - Normal   URINE CULTURE   CBC WITH PLATELETS & DIFFERENTIAL    Narrative:     The following orders were created for panel order CBC with platelets differential.  Procedure                               Abnormality         Status                     ---------                               -----------         ------                     CBC with platelets and d...[[id number]]  Abnormal            Final result                 Please view results for these tests on the individual orders.            Results for orders placed or performed during the hospital encounter of 10/22/21 (from the past 24 hour(s))   UA with Microscopic reflex to Culture    Specimen: Urine, Midstream   Result Value Ref Range    Color Urine Yellow Colorless, Straw, Light Yellow, Yellow    Appearance Urine Slightly Cloudy (A) Clear    Glucose Urine Negative Negative mg/dL    Bilirubin Urine Negative Negative    Ketones Urine Negative Negative mg/dL    Specific Gravity Urine 1.020 1.003 - 1.035    Blood Urine Negative Negative    pH Urine 6.5 5.0 - 7.0    Protein Albumin Urine 50  (A) Negative mg/dL    Urobilinogen Urine 2.0 Normal, 2.0 mg/dL     Nitrite Urine Negative Negative    Leukocyte Esterase Urine Moderate (A) Negative    Bacteria Urine Few (A) None Seen /HPF    Mucus Urine Present (A) None Seen /LPF    RBC Urine 2 <=2 /HPF    WBC Urine 19 (H) <=5 /HPF    Squamous Epithelials Urine 26 (H) <=1 /HPF    Narrative    Urine Culture ordered based on laboratory criteria   CBC with platelets differential    Narrative    The following orders were created for panel order CBC with platelets differential.  Procedure                               Abnormality         Status                     ---------                               -----------         ------                     CBC with platelets and d...[[id number]]  Abnormal            Final result                 Please view results for these tests on the individual orders.   Comprehensive metabolic panel   Result Value Ref Range    Sodium 139 133 - 144 mmol/L    Potassium 3.4 3.4 - 5.3 mmol/L    Chloride 109 94 - 109 mmol/L    Carbon Dioxide (CO2) 27 20 - 32 mmol/L    Anion Gap 3 3 - 14 mmol/L    Urea Nitrogen 9 7 - 30 mg/dL    Creatinine 0.83 0.52 - 1.04 mg/dL    Calcium 8.8 8.5 - 10.1 mg/dL    Glucose 85 70 - 99 mg/dL    Alkaline Phosphatase 65 40 - 150 U/L    AST 12 0 - 45 U/L    ALT 15 0 - 50 U/L    Protein Total 7.4 6.8 - 8.8 g/dL    Albumin 3.2 (L) 3.4 - 5.0 g/dL    Bilirubin Total 0.3 0.2 - 1.3 mg/dL    GFR Estimate >90 >60 mL/min/1.73m2   Lipase   Result Value Ref Range    Lipase 143 73 - 393 U/L   HCG qualitative Blood   Result Value Ref Range    hCG Serum Qualitative Negative Negative   CBC with platelets and differential   Result Value Ref Range    WBC Count 6.7 4.0 - 11.0 10e3/uL    RBC Count 3.45 (L) 3.80 - 5.20 10e6/uL    Hemoglobin 10.6 (L) 11.7 - 15.7 g/dL    Hematocrit 32.3 (L) 35.0 - 47.0 %    MCV 94 78 - 100 fL    MCH 30.7 26.5 - 33.0 pg    MCHC 32.8 31.5 - 36.5 g/dL    RDW 12.9 10.0 - 15.0 %    Platelet Count 355 150 - 450 10e3/uL    % Neutrophils 69 %    % Lymphocytes 21 %    %  Monocytes 8 %    % Eosinophils 2 %    % Basophils 0 %    % Immature Granulocytes 0 %    NRBCs per 100 WBC 0 <1 /100    Absolute Neutrophils 4.6 1.6 - 8.3 10e3/uL    Absolute Lymphocytes 1.4 0.8 - 5.3 10e3/uL    Absolute Monocytes 0.5 0.0 - 1.3 10e3/uL    Absolute Eosinophils 0.1 0.0 - 0.7 10e3/uL    Absolute Basophils 0.0 0.0 - 0.2 10e3/uL    Absolute Immature Granulocytes 0.0 <=0.0 10e3/uL    Absolute NRBCs 0.0 10e3/uL   CT Abdomen Pelvis w Contrast    Narrative    EXAM: CT ABDOMEN PELVIS W CONTRAST  LOCATION: [facility]  DATE/TIME: 10/22/2021 6:39 PM    INDICATION: Abdominal pain, acute, nonlocalized  COMPARISON: None.  TECHNIQUE: CT scan of the abdomen and pelvis was performed following injection of IV contrast. Multiplanar reformats were obtained. Dose reduction techniques were used.  CONTRAST: 92mL Isovue 370    FINDINGS:   LOWER CHEST: Negative.    HEPATOBILIARY: Normal.    PANCREAS: Normal.    SPLEEN: Normal.    ADRENAL GLANDS: Normal.    KIDNEYS/BLADDER: Normal.    BOWEL: No obstruction or inflammatory change identified.  There is mild nonspecific soft tissue stranding seen within the omentum of right mid abdomen, etiology uncertain. The adjacent bowel in this region appears within normal limits. No free fluid.    LYMPH NODES: No lymphadenopathy.    VASCULATURE: Unremarkable.    PELVIC ORGANS: No adnexal lesions. No free pelvic fluid.    MUSCULOSKELETAL: There is a tiny umbilical hernia containing fat and a slight amount of soft tissue density but no definite bowel involvement. Prior low transverse incision      Impression    IMPRESSION:   1.  No definite etiology for symptoms. Nothing obstructive or inflammatory involving bowel.  2.  There is mild nonspecific soft tissue stranding involving right side of the omentum at mid abdomen, etiology uncertain.       Labs, vital signs, and imaging studies were reviewed by me.    Medications   cephALEXin (KEFLEX) capsule  500 mg (has no administration in time range)   iopamidol (ISOVUE-370) solution 100 mL (92 mLs Intravenous Given 10/22/21 1910)   sodium chloride 0.9 % bag 100mL (63 mLs Intravenous Given 10/22/21 1911)       Assessments & Plan (with Medical Decision Making)   Juventino Dickerson is a 30 year old female who presents with abdominal pain.  Differential diagnosis includes small bowel obstruction, constipation, gastritis/gastroenteritis, urinary tract infection, pregnancy, musculoskeletal abdominal wall pain/pain secondary to scar tissue formation.  Labs, urinalysis, CT ordered to further evaluate the patient.  Patient declined pain medication in the emergency department.    Laboratory work-up is remarkable for hemoglobin 10.6, white blood cell count within normal limits.  Urinalysis is contaminated, but does have significant amount of WBCs. Will plan to presumptively treat with antibiotics as UTI may explain patient's abdominal pain and will send urine for culture.     CT shows no definite etiology for patient's symptoms.    I have reviewed the nursing notes.    I have reviewed the findings, diagnosis, plan and need for follow up with the patient.    Patient to be discharged home. Advised to follow up with PCP within 1 week. To return to ER immediately with any new/worsening symptoms. Plan of care discussed with patient who expresses understanding and agrees with plan of care.      New Prescriptions    CEPHALEXIN (KEFLEX) 500 MG CAPSULE    Take 1 capsule (500 mg) by mouth 2 times daily for 3 days       Final diagnoses:   Abdominal pain, generalized   Acute UTI     Joana Rodriguez MD  10/22/2021   [facility] EMERGENCY DEPARTMENT     Joana Rodriguez MD  10/22/21 2010

## 2021-10-24 LAB — BACTERIA UR CULT: NORMAL

## 2022-01-22 ENCOUNTER — HEALTH MAINTENANCE LETTER (OUTPATIENT)
Age: 32
End: 2022-01-22

## 2022-03-11 ENCOUNTER — MYC MEDICAL ADVICE (OUTPATIENT)
Dept: RHEUMATOLOGY | Facility: CLINIC | Age: 32
End: 2022-03-11
Payer: MEDICARE

## 2022-03-11 DIAGNOSIS — D86.0 SARCOIDOSIS, LUNG (H): Primary | ICD-10-CM

## 2022-03-11 DIAGNOSIS — R05.9 COUGH: ICD-10-CM

## 2022-03-23 NOTE — TELEPHONE ENCOUNTER
Pt is wanting to see Dr Riggins about some of the symptoms she is experiencing. Pt said she cannot wait until next available appointment in Aug. Is there anyway that Dr Riggins can see the pt before then?

## 2022-03-28 NOTE — TELEPHONE ENCOUNTER
"Return call placed to patient to discuss her request for an appointment with Dr. Riggins sooner than next available on 9/2/22. Patient states that she recently has experienced a \"stuffy nose\", denies sinus pressure, cough, shortness of breath or nighttime sweats or seasonal allergies.   Patient's last visit was on 1/29/21, no show 3/19/21 and 4/20/21.     Routing above assessment of symptoms and patient's request for an appointment prior to 9/2/22 to Dr. Riggins for review.  Francesca Hernandez RN  [facility]     "

## 2022-04-04 NOTE — TELEPHONE ENCOUNTER
"Thank you for update. If respiratory symptoms continue, I recommend CBC with platelets and diff, CMP, UA, and CRP, as well as chest Xray PA and lateral \"cough\". Please place patient on \"call for short-notice cancellation\" list.   "

## 2022-09-03 ENCOUNTER — HEALTH MAINTENANCE LETTER (OUTPATIENT)
Age: 32
End: 2022-09-03

## 2022-10-03 ENCOUNTER — HOSPITAL ENCOUNTER (OUTPATIENT)
Facility: CLINIC | Age: 32
Discharge: HOME | End: 2022-10-03
Admitting: INTERNAL MEDICINE
Payer: MEDICARE

## 2022-10-03 LAB
ALBUMIN SERPL BCG-MCNC: 4.4 G/DL (ref 3.5–5.2)
ALBUMIN UR-MCNC: NEGATIVE MG/DL
ALP SERPL-CCNC: 75 U/L (ref 35–104)
ALT SERPL W P-5'-P-CCNC: 10 U/L (ref 10–35)
ANION GAP SERPL CALCULATED.3IONS-SCNC: 10 MMOL/L (ref 7–15)
APPEARANCE UR: CLEAR
AST SERPL W P-5'-P-CCNC: 17 U/L (ref 10–35)
BASOPHILS # BLD AUTO: 0 10E3/UL (ref 0–0.2)
BASOPHILS NFR BLD AUTO: 1 %
BILIRUB SERPL-MCNC: 0.3 MG/DL
BILIRUB UR QL STRIP: NEGATIVE
BUN SERPL-MCNC: 9.2 MG/DL (ref 6–20)
CALCIUM SERPL-MCNC: 9.7 MG/DL (ref 8.6–10)
CHLORIDE SERPL-SCNC: 105 MMOL/L (ref 98–107)
COLOR UR AUTO: YELLOW
CREAT SERPL-MCNC: 0.77 MG/DL (ref 0.51–0.95)
CRP SERPL-MCNC: 6.44 MG/L
DEPRECATED HCO3 PLAS-SCNC: 23 MMOL/L (ref 22–29)
EOSINOPHIL # BLD AUTO: 0.3 10E3/UL (ref 0–0.7)
EOSINOPHIL NFR BLD AUTO: 5 %
ERYTHROCYTE [DISTWIDTH] IN BLOOD BY AUTOMATED COUNT: 12.8 % (ref 10–15)
GFR SERPL CREATININE-BSD FRML MDRD: >90 ML/MIN/1.73M2
GLUCOSE SERPL-MCNC: 105 MG/DL (ref 70–99)
GLUCOSE UR STRIP-MCNC: NEGATIVE MG/DL
HCT VFR BLD AUTO: 37.5 % (ref 35–47)
HGB BLD-MCNC: 12.6 G/DL (ref 11.7–15.7)
HGB UR QL STRIP: NEGATIVE
IMM GRANULOCYTES # BLD: 0 10E3/UL
IMM GRANULOCYTES NFR BLD: 0 %
KETONES UR STRIP-MCNC: NEGATIVE MG/DL
LEUKOCYTE ESTERASE UR QL STRIP: ABNORMAL
LYMPHOCYTES # BLD AUTO: 2.2 10E3/UL (ref 0.8–5.3)
LYMPHOCYTES NFR BLD AUTO: 37 %
MCH RBC QN AUTO: 30.4 PG (ref 26.5–33)
MCHC RBC AUTO-ENTMCNC: 33.6 G/DL (ref 31.5–36.5)
MCV RBC AUTO: 90 FL (ref 78–100)
MONOCYTES # BLD AUTO: 0.4 10E3/UL (ref 0–1.3)
MONOCYTES NFR BLD AUTO: 6 %
MUCOUS THREADS #/AREA URNS LPF: PRESENT /LPF
NEUTROPHILS # BLD AUTO: 3.1 10E3/UL (ref 1.6–8.3)
NEUTROPHILS NFR BLD AUTO: 51 %
NITRATE UR QL: NEGATIVE
NRBC # BLD AUTO: 0 10E3/UL
NRBC BLD AUTO-RTO: 0 /100
PH UR STRIP: 6 [PH] (ref 5–7)
PLATELET # BLD AUTO: 408 10E3/UL (ref 150–450)
POTASSIUM SERPL-SCNC: 4.4 MMOL/L (ref 3.4–5.3)
PROT SERPL-MCNC: 7.4 G/DL (ref 6.4–8.3)
RBC # BLD AUTO: 4.15 10E6/UL (ref 3.8–5.2)
RBC URINE: 1 /HPF
SODIUM SERPL-SCNC: 138 MMOL/L (ref 136–145)
SP GR UR STRIP: 1.02 (ref 1–1.03)
SQUAMOUS EPITHELIAL: 7 /HPF
UROBILINOGEN UR STRIP-MCNC: NORMAL MG/DL
WBC # BLD AUTO: 6.1 10E3/UL (ref 4–11)
WBC URINE: 5 /HPF

## 2022-10-03 PROCEDURE — 81001 URINALYSIS AUTO W/SCOPE: CPT | Performed by: INTERNAL MEDICINE

## 2022-10-03 PROCEDURE — 80053 COMPREHEN METABOLIC PANEL: CPT | Performed by: INTERNAL MEDICINE

## 2022-10-03 PROCEDURE — 86140 C-REACTIVE PROTEIN: CPT | Performed by: INTERNAL MEDICINE

## 2022-10-03 PROCEDURE — 85025 COMPLETE CBC W/AUTO DIFF WBC: CPT | Performed by: INTERNAL MEDICINE

## 2022-10-03 PROCEDURE — 36415 COLL VENOUS BLD VENIPUNCTURE: CPT | Performed by: INTERNAL MEDICINE

## 2023-04-03 PROBLEM — O36.60X0 MATERNAL CARE FOR EXCESSIVE FETAL GROWTH, UNSPECIFIED TRIMESTER, NOT APPLICABLE OR UNSPECIFIED: Status: ACTIVE | Noted: 2021-07-23

## 2023-04-05 ENCOUNTER — HOSPITAL ENCOUNTER (EMERGENCY)
Facility: CLINIC | Age: 33
Discharge: HOME | End: 2023-04-05
Attending: EMERGENCY MEDICINE | Admitting: EMERGENCY MEDICINE
Payer: MEDICARE

## 2023-04-05 ENCOUNTER — APPOINTMENT (OUTPATIENT)
Dept: GENERAL RADIOLOGY | Facility: CLINIC | Age: 33
End: 2023-04-05
Attending: EMERGENCY MEDICINE
Payer: MEDICARE

## 2023-04-05 VITALS
DIASTOLIC BLOOD PRESSURE: 85 MMHG | SYSTOLIC BLOOD PRESSURE: 139 MMHG | OXYGEN SATURATION: 99 % | TEMPERATURE: 97.4 F | HEART RATE: 70 BPM | RESPIRATION RATE: 20 BRPM

## 2023-04-05 DIAGNOSIS — B34.9 VIRAL SYNDROME: ICD-10-CM

## 2023-04-05 DIAGNOSIS — J02.9 SORE THROAT: ICD-10-CM

## 2023-04-05 LAB
FLUAV RNA SPEC QL NAA+PROBE: NEGATIVE
FLUBV RNA RESP QL NAA+PROBE: NEGATIVE
GROUP A STREP BY PCR: NOT DETECTED
RSV RNA SPEC NAA+PROBE: NEGATIVE
SARS-COV-2 RNA RESP QL NAA+PROBE: NEGATIVE

## 2023-04-05 PROCEDURE — C9803 HOPD COVID-19 SPEC COLLECT: HCPCS | Performed by: EMERGENCY MEDICINE

## 2023-04-05 PROCEDURE — 71046 X-RAY EXAM CHEST 2 VIEWS: CPT | Mod: 26 | Performed by: RADIOLOGY

## 2023-04-05 PROCEDURE — 250N000013 HC RX MED GY IP 250 OP 250 PS 637: Performed by: EMERGENCY MEDICINE

## 2023-04-05 PROCEDURE — 87651 STREP A DNA AMP PROBE: CPT | Performed by: EMERGENCY MEDICINE

## 2023-04-05 PROCEDURE — 99284 EMERGENCY DEPT VISIT MOD MDM: CPT | Mod: CS | Performed by: EMERGENCY MEDICINE

## 2023-04-05 PROCEDURE — 250N000011 HC RX IP 250 OP 636: Performed by: EMERGENCY MEDICINE

## 2023-04-05 PROCEDURE — 71046 X-RAY EXAM CHEST 2 VIEWS: CPT

## 2023-04-05 PROCEDURE — 99284 EMERGENCY DEPT VISIT MOD MDM: CPT | Mod: CS,25 | Performed by: EMERGENCY MEDICINE

## 2023-04-05 PROCEDURE — 87637 SARSCOV2&INF A&B&RSV AMP PRB: CPT | Performed by: EMERGENCY MEDICINE

## 2023-04-05 PROCEDURE — 96372 THER/PROPH/DIAG INJ SC/IM: CPT | Performed by: EMERGENCY MEDICINE

## 2023-04-05 RX ORDER — BENZOCAINE AND MENTHOL, UNSPECIFIED FORM 15; 2.3 MG/1; MG/1
1 LOZENGE ORAL
Qty: 16 LOZENGE | Refills: 0 | Status: SHIPPED | OUTPATIENT
Start: 2023-04-05

## 2023-04-05 RX ORDER — IBUPROFEN 600 MG/1
600 TABLET, FILM COATED ORAL EVERY 6 HOURS PRN
Qty: 30 TABLET | Refills: 0 | Status: SHIPPED | OUTPATIENT
Start: 2023-04-05 | End: 2024-05-21

## 2023-04-05 RX ORDER — IBUPROFEN 600 MG/1
600 TABLET, FILM COATED ORAL
Status: DISCONTINUED | OUTPATIENT
Start: 2023-04-05 | End: 2023-04-05

## 2023-04-05 RX ORDER — KETOROLAC TROMETHAMINE 30 MG/ML
30 INJECTION, SOLUTION INTRAMUSCULAR; INTRAVENOUS
Status: COMPLETED | OUTPATIENT
Start: 2023-04-05 | End: 2023-04-05

## 2023-04-05 RX ADMIN — Medication 1 LOZENGE: at 11:08

## 2023-04-05 RX ADMIN — KETOROLAC TROMETHAMINE 30 MG: 30 INJECTION, SOLUTION INTRAMUSCULAR; INTRAVENOUS at 10:18

## 2023-04-05 ASSESSMENT — ACTIVITIES OF DAILY LIVING (ADL)
ADLS_ACUITY_SCORE: 33
ADLS_ACUITY_SCORE: 35

## 2023-04-05 NOTE — ED TRIAGE NOTES
Pt arrives ambulatory to ED  Pt c/o sore throat 5 days with intermittent cough. Denies fever.   Tried cold medicine and tea with no results.

## 2023-04-05 NOTE — ED PROVIDER NOTES
History     Chief Complaint   Patient presents with     Flu Symptoms     HPI  Juventino Dickerson is a 32 year old female with a past medical history of depression, sarcoidosis who presents to the emergency department with a chief complaint of sore throat.  Has been present for 5 days.  Associate with intermittent cough.  Patient denies fever and is afebrile on arrival to the emergency department.  The patient states she tried to alleviate her symptoms at home using over-the-counter cold medication (Dayquil, Nyquil, theraflu, Halls) and tea which did not help.    I have reviewed the Medications, Allergies, Past Medical and Surgical History, and Social History in the [employer] system.    Past Medical History:   Diagnosis Date     Depressive disorder      History of shoulder dystocia in prior pregnancy      Postpartum depression      Pulmonary sarcoidosis (H)      Past Surgical History:   Procedure Laterality Date     BRONCHOSCOPY RIDID OR FLEXIBLE W/ENDOBRONCHIAL ULTRASOUND GUIDED 3 OR MORE NODE STATIONS N/A 2020    Procedure: BRONCHOSCOPY, WITH BIOPSY OF 3 OR MORE LYMPH NODE STATIONS WITH ENDOBRONCHIAL ULTRASOUND GUIDANCE;  Surgeon: Kevin Kramer MD;  [facility]      SECTION N/A 2021    Procedure:  SECTION;  Surgeon: Debbie Kumari MD;  Location:  L+D     No current facility-administered medications for this encounter.     Current Outpatient Medications   Medication     acetaminophen (TYLENOL) 325 MG tablet     acetaminophen (TYLENOL) 500 MG tablet     albuterol (PROAIR HFA/PROVENTIL HFA/VENTOLIN HFA) 108 (90 Base) MCG/ACT inhaler     fluticasone (FLONASE) 50 MCG/ACT nasal spray     ibuprofen (ADVIL/MOTRIN) 600 MG tablet     ibuprofen (ADVIL/MOTRIN) 800 MG tablet     oxyCODONE (ROXICODONE) 5 MG tablet     Prenatal Vit-Fe Fumarate-FA (PRENATAL MULTIVITAMIN W/IRON) 27-0.8 MG tablet     SENNA-docusate sodium (SENNA S) 8.6-50 MG tablet     vitamin C (ASCORBIC ACID) 250 MG  tablet     No Known Allergies  Past medical history, past surgical history, medications, and allergies were reviewed with the patient. Additional pertinent items: None    Social History     Socioeconomic History     Marital status: Single     Spouse name: Not on file     Number of children: Not on file     Years of education: Not on file     Highest education level: Not on file   Occupational History     Occupation: Unemployed   Tobacco Use     Smoking status: Never     Smokeless tobacco: Never   Vaping Use     Vaping status: Not on file   Substance and Sexual Activity     Alcohol use: Not Currently     Drug use: Not Currently     Sexual activity: Yes     Partners: Male   Other Topics Concern     Parent/sibling w/ CABG, MI or angioplasty before 65F 55M? Not Asked   Social History Narrative     Not on file     Social Determinants of Health     Financial Resource Strain: Not on file   Food Insecurity: Not on file   Transportation Needs: Not on file   Physical Activity: Not on file   Stress: Not on file   Social Connections: Not on file   Intimate Partner Violence: Not on file   Housing Stability: Not on file     Social history was reviewed with the patient. Additional pertinent items: None    Review of Systems  A medically appropriate review of systems was performed with pertinent positives and negatives noted in the HPI, and all other systems negative.    Physical Exam   BP: 139/85  Pulse: 70  Temp: 97.4  F (36.3  C)  Resp: 20  SpO2: 99 %      General: Well nourished, well developed, NAD  HEENT: EOMI, anicteric. NCAT, MMM, posterior oropharyngeal erythema present without significant tonsillar swelling or exudate, no uvular deviation  Neck: no jugular venous distension, supple, nl ROM, mild cervical LAD  Cardiac: Regular rate and rhythm. No murmurs, rubs, or gallops. Normal S1, S2.  Intact peripheral pulses  Pulm: CTAB, no stridor, wheezes, rales, rhonchi  Skin: Warm and dry to the touch.  No rash  Extremities: No LE  edema, no cyanosis, w/w/p  Neuro: A&Ox3, no gross focal deficits    ED Course        Procedures                           Labs Ordered and Resulted from Time of ED Arrival to Time of ED Departure   INFLUENZA A/B, RSV, & SARS-COV2 PCR - Normal       Result Value    Influenza A PCR Negative      Influenza B PCR Negative      RSV PCR Negative      SARS CoV2 PCR Negative     GROUP A STREPTOCOCCUS PCR THROAT SWAB - Normal    Group A strep by PCR Not Detected              Results for orders placed or performed during the hospital encounter of 04/05/23 (from the past 24 hour(s))   Group A Streptococcus PCR Throat Swab    Specimen: Throat; Swab   Result Value Ref Range    Group A strep by PCR Not Detected Not Detected    Narrative    The Xpert Xpress Strep A test, performed on the [employer], is a rapid, qualitative in vitro diagnostic test for the detection of Streptococcus pyogenes (Group A ß-hemolytic Streptococcus, Strep A) in throat swab specimens from patients with signs and symptoms of pharyngitis. The Xpert Xpress Strep A test can be used as an aid in the diagnosis of Group A Streptococcal pharyngitis. The assay is not intended to monitor treatment for Group A Streptococcus infections. The Xpert Xpress Strep A test utilizes an automated real-time polymerase chain reaction (PCR) to detect Streptococcus pyogenes DNA.   Symptomatic Influenza A/B, RSV, & SARS-CoV2 PCR (COVID-19) Nose    Specimen: Nose; Swab   Result Value Ref Range    Influenza A PCR Negative Negative    Influenza B PCR Negative Negative    RSV PCR Negative Negative    SARS CoV2 PCR Negative Negative    Narrative    Testing was performed using the Xpert Xpress CoV2/Flu/RSV Assay on the [employer] Instrument. This test should be ordered for the detection of SARS-CoV-2, influenza, and RSV viruses in individuals who meet clinical and/or epidemiological criteria. Test performance is unknown in asymptomatic patients. This test is  for in vitro diagnostic use under the FDA EUA for laboratories certified under CLIA to perform high or moderate complexity testing. This test has not been FDA cleared or approved. A negative result does not rule out the presence of PCR inhibitors in the specimen or target RNA in concentration below the limit of detection for the assay. If only one viral target is positive but coinfection with multiple targets is suspected, the sample should be re-tested with another FDA cleared, approved, or authorized test, if coinfection would change clinical management. This test was validated by the [facility]. These laboratories are certified under the Clinical Laboratory Improvement Amendments of 1988 (CLIA-88) as qualified to perform high complexity laboratory testing.   XR Chest 2 Views    Narrative    Chest 2 views    INDICATION: Cough    COMPARISON: 10/3/2022    FINDINGS: Heart size and shape appear normal. Bony structures appear  normal. No new suspicious pulmonary opacities.      Impression    IMPRESSION: Unchanged streaky densities in the lower lung zones which  could represent possible viral infection or other subsegmental  atelectasis. No dipak consolidation or effusion or edema. No obvious  nodule.    KASSI GUILLORY MD         SYSTEM ID:  [id number]       Labs, vital signs, and imaging studies were reviewed by me.    Medications   benzocaine-menthol (CHLORASEPTIC MAX) 15-10 MG lozenge 1 lozenge (1 lozenge Buccal $Given 4/5/23 1108)   ketorolac (TORADOL) injection 30 mg (30 mg Intramuscular $Given 4/5/23 1018)       Assessments & Plan (with Medical Decision Making)   Juventino Dickerson is a 32 year old female who presents with sore throat. Differential diagnosis includes strep throat, COVID-19 infection, influenza, RSV, other viral syndrome, pneumonia.  Strep testing, COVID/flu/RSV testing, and chest x-ray were ordered to further evaluate the patient.  Medications ordered for symptomatic relief in  the emergency department.    Strep testing is negative.  COVID/flu/RSV testing is negative.    Chest x-ray shows findings c/w viral infection, no focal pneumonia    Critical care was not performed.     Medical Decision Making  The patient's presentation was of low complexity (an acute and uncomplicated illness or injury).    The patient's evaluation involved:  ordering and/or review of 3+ test(s) in this encounter (see separate area of note for details)  independent interpretation of testing performed by another health professional (CXR)    The patient's management necessitated moderate risk (prescription drug management including medications given in the ED).      I have reviewed the nursing notes.    I have reviewed the findings, diagnosis, plan and need for follow up with the patient.    Patient to be discharged home. Advised to follow up with PCP within 1 week. To return to ER immediately with any new/worsening symptoms. Plan of care discussed with patient who expresses understanding and agrees with plan of care.    New Prescriptions    BENZOCAINE-MENTHOL (CEPACOL) 15-2.3 MG LOZG    Take 1 lozenge by mouth every 2 hours as needed (sore throat)    IBUPROFEN (ADVIL/MOTRIN) 600 MG TABLET    Take 1 tablet (600 mg) by mouth every 6 hours as needed       Final diagnoses:   Sore throat   Viral syndrome       Joana Rodriguez MD  4/5/2023   [facility] EMERGENCY DEPARTMENT     Joana Rodriguez MD  04/05/23 112

## 2023-04-05 NOTE — DISCHARGE INSTRUCTIONS
TODAY'S VISIT:  You were seen today for sore throat  -   - If you had any labs or imaging/radiology tests performed today, you should also discuss these tests with your usual provider.     FOLLOW-UP:  Please make an appointment to follow up with:  - Your Primary Care Provider. If you do not have a PCP, please call the Primary Care Center (phone: [phone] for an appointment    - Have your provider review the results from today's visit with you again to make sure no further follow-up or additional testing is needed based on those results.     RETURN TO THE EMERGENCY DEPARTMENT  Return to the Emergency Department at any time for any new or worsening symptoms or any concerns.

## 2023-04-23 ENCOUNTER — HEALTH MAINTENANCE LETTER (OUTPATIENT)
Age: 33
End: 2023-04-23

## 2024-02-19 ENCOUNTER — HOSPITAL ENCOUNTER (EMERGENCY)
Facility: CLINIC | Age: 34
Discharge: HOME | End: 2024-02-19
Attending: EMERGENCY MEDICINE | Admitting: EMERGENCY MEDICINE
Payer: MEDICARE

## 2024-02-19 ENCOUNTER — APPOINTMENT (OUTPATIENT)
Dept: GENERAL RADIOLOGY | Facility: CLINIC | Age: 34
End: 2024-02-19
Attending: EMERGENCY MEDICINE
Payer: MEDICARE

## 2024-02-19 VITALS
DIASTOLIC BLOOD PRESSURE: 66 MMHG | HEART RATE: 78 BPM | RESPIRATION RATE: 16 BRPM | OXYGEN SATURATION: 98 % | TEMPERATURE: 97.8 F | SYSTOLIC BLOOD PRESSURE: 105 MMHG

## 2024-02-19 DIAGNOSIS — B37.0 THRUSH: ICD-10-CM

## 2024-02-19 DIAGNOSIS — M72.2 PLANTAR FASCIITIS: ICD-10-CM

## 2024-02-19 PROCEDURE — 73630 X-RAY EXAM OF FOOT: CPT | Mod: 26 | Performed by: RADIOLOGY

## 2024-02-19 PROCEDURE — 73630 X-RAY EXAM OF FOOT: CPT | Mod: RT

## 2024-02-19 PROCEDURE — 99284 EMERGENCY DEPT VISIT MOD MDM: CPT | Performed by: EMERGENCY MEDICINE

## 2024-02-19 PROCEDURE — 99283 EMERGENCY DEPT VISIT LOW MDM: CPT | Performed by: EMERGENCY MEDICINE

## 2024-02-19 RX ORDER — NYSTATIN 100000/ML
500000 SUSPENSION, ORAL (FINAL DOSE FORM) ORAL 4 TIMES DAILY
Qty: 140 ML | Refills: 0 | Status: SHIPPED | OUTPATIENT
Start: 2024-02-19 | End: 2024-02-26

## 2024-02-19 ASSESSMENT — ACTIVITIES OF DAILY LIVING (ADL)
ADLS_ACUITY_SCORE: 35
ADLS_ACUITY_SCORE: 35

## 2024-02-19 NOTE — ED TRIAGE NOTES
C/o tongue burning while eating food for 4 months  Also have R inner foot pain  No falls, no trauma     Triage Assessment (Adult)       Row Name 02/19/24 0937          Triage Assessment    Airway WDL WDL        Respiratory WDL    Respiratory WDL WDL        Skin Circulation/Temperature WDL    Skin Circulation/Temperature WDL WDL        Cardiac WDL    Cardiac WDL WDL        Peripheral/Neurovascular WDL    Peripheral Neurovascular WDL WDL        Cognitive/Neuro/Behavioral WDL    Cognitive/Neuro/Behavioral WDL WDL

## 2024-02-19 NOTE — DISCHARGE INSTRUCTIONS
Follow-up with ENT for further evaluation of mouth sores.  [facility] will call you to coordinate your care as prescribed by the provider. If you don t hear from a representative within 2 business days, please call [phone].  Use nystatin swish and swallow 4 times a day for the next 7 days.    Use crutches to minimize weightbearing for the next week.  Obtain shoes with good arch support.  Follow-up with your primary care provider for reevaluation and additional physical therapy as needed.

## 2024-02-19 NOTE — ED PROVIDER NOTES
ED Provider Note  [facility]      History     Chief Complaint   Patient presents with    Foot Pain    Mouth Problem     HPI  Juventino Dickerson is a 33 year old female with a history of sarcoidosis and depression presents emergency department for right foot pain and mouth burning while eating.    33-year-old female with a history of sarcoidosis who presents to the emergency department with 2 concerns, 1 being burning in her mouth and tubing right foot pain.  She reports for the last 4 months she has had burning in her mouth whenever she eats.  She denies any associated nausea, vomiting or abdominal pain.  She has noted developing white plaques on the buccal mucosa of her mouth bilaterally.  She feels that she is also having some cracking of her tongue.  She was seen in clinic and given viscous lidocaine without any improvement in symptoms.  She denies any fevers or difficulty swallowing.  Additionally she reports right foot pain that started approximately 2 weeks ago.  She denies any traumatic injury.  She did change shoes recently.  She has not had any redness, swelling, warmth of her foot.  Denies any sores.  No recent fevers.  No numbness or tingling.   not noted any swelling in her legs.  Past Medical History  Past Medical History:   Diagnosis Date    Depressive disorder     History of shoulder dystocia in prior pregnancy     Postpartum depression     Pulmonary sarcoidosis (H)      Past Surgical History:   Procedure Laterality Date    BRONCHOSCOPY RIDID OR FLEXIBLE W/ENDOBRONCHIAL ULTRASOUND GUIDED 3 OR MORE NODE STATIONS N/A 2020    Procedure: BRONCHOSCOPY, WITH BIOPSY OF 3 OR MORE LYMPH NODE STATIONS WITH ENDOBRONCHIAL ULTRASOUND GUIDANCE;  Surgeon: Kevin Kramer MD;  Location: [facility]     SECTION N/A 2021    Procedure:  SECTION;  Surgeon: Debbie Kumari MD;  [facility]: [facility]     acetaminophen (TYLENOL) 325 MG tablet  acetaminophen (TYLENOL)  500 MG tablet  albuterol (PROAIR HFA/PROVENTIL HFA/VENTOLIN HFA) 108 (90 Base) MCG/ACT inhaler  Benzocaine-Menthol (CEPACOL) 15-2.3 MG LOZG  fluticasone (FLONASE) 50 MCG/ACT nasal spray  ibuprofen (ADVIL/MOTRIN) 600 MG tablet  ibuprofen (ADVIL/MOTRIN) 600 MG tablet  ibuprofen (ADVIL/MOTRIN) 800 MG tablet  oxyCODONE (ROXICODONE) 5 MG tablet  Prenatal Vit-Fe Fumarate-FA (PRENATAL MULTIVITAMIN W/IRON) 27-0.8 MG tablet  SENNA-docusate sodium (SENNA S) 8.6-50 MG tablet  vitamin C (ASCORBIC ACID) 250 MG tablet      No Known Allergies  Family History  Family History   Problem Relation Age of Onset    Diabetes Father      Social History   Social History     Tobacco Use    Smoking status: Never    Smokeless tobacco: Never   Substance Use Topics    Alcohol use: Not Currently    Drug use: Not Currently         A medically appropriate review of systems was performed with pertinent positives and negatives noted in the HPI, and all other systems negative.    Physical Exam   BP: 105/66  Pulse: 78  Temp: 97.8  F (36.6  C)  Resp: 16  SpO2: 98 %  Physical Exam  General: patient is alert and oriented and in no acute distress   Head: atraumatic and normocephalic   EENT: moist mucus membranes, white plaque is noted in the buccal mucosa bilaterally, no aphthous ulcers or open sores noted,  pupils round and reactive   Neck: supple   Cardiovascular: regular rate and rhythm, extremities warm and well perfused, no lower extremity edema  Pulmonary: lungs clear to auscultation bilaterally   Abdomen: soft, non-tender   Musculoskeletal: normal range of motion,  no point bony tenderness to palpation of the foot, tenderness along the plantar fascia particularly at the calcaneal insertion  Neurological: alert and oriented, moving all extremities symmetrically, gait normal   Skin: warm, dry, no erythema or warmth noted      ED Course, Procedures, & Data      Procedures                No results found for any visits on 02/19/24.  Medications - No  data to display  Labs Ordered and Resulted from Time of ED Arrival to Time of ED Departure - No data to display  No orders to display          Critical care was not performed.     Medical Decision Making  The patient's presentation was of moderate complexity (an undiagnosed new problem with uncertain diagnosis).    The patient's evaluation involved:  review of external note(s) from 1 sources (PCP notes)  ordering and/or review of 1 test(s) in this encounter (XR)    The patient's management necessitated moderate risk (prescription drug management including medications given in the ED).    Assessment & Plan    33-year-old female history of pulmonary sarcoidosis who presents with mouth sores and right foot pain.  Regards to her mouth she does have white plaques noted on the buccal mucosa of her mouth.  This has been present for a number of months now.  Will treat with nystatin swish and swallow for possible fungal component.  Have also placed a referral for ENT given the duration of her symptoms for reevaluation and biopsy as needed.  In regards to her foot she does have tenderness along the insertion of her plantar fascia.  X-ray is unremarkable.  No evidence of cellulitis on exam.  History and exam are most consistent with cluster of plantar fasciitis.  She was given crutches for support and instructed to obtain new shoes with good arch support.  She was also instructed to follow-up with her primary care provider for reevaluation in clinic.    I have reviewed the nursing notes. I have reviewed the findings, diagnosis, plan and need for follow up with the patient.    Discharge Medication List as of 2/19/2024 11:36 AM        START taking these medications    Details   nystatin (MYCOSTATIN) [id number] UNIT/ML suspension Take 5 mLs (500,000 Units) by mouth 4 times daily for 7 daysDisp-140 mL, R-0Local Print             Final diagnoses:   Plantar fasciitis   Thrush     This part of the medical record was transcribed by Shreya  A  Althea, Medical Scribe, from a dictation done by Meri Mclean MD.     Meri Mclean MD  [facility] EMERGENCY DEPARTMENT  2/19/2024     Meri Mclean MD  02/19/24 1534

## 2024-04-27 ENCOUNTER — HEALTH MAINTENANCE LETTER (OUTPATIENT)
Age: 34
End: 2024-04-27

## 2024-05-21 ENCOUNTER — HOSPITAL ENCOUNTER (EMERGENCY)
Facility: CLINIC | Age: 34
Discharge: HOME | End: 2024-05-21
Attending: EMERGENCY MEDICINE | Admitting: EMERGENCY MEDICINE
Payer: MEDICARE

## 2024-05-21 VITALS
SYSTOLIC BLOOD PRESSURE: 93 MMHG | TEMPERATURE: 98.2 F | DIASTOLIC BLOOD PRESSURE: 57 MMHG | RESPIRATION RATE: 15 BRPM | OXYGEN SATURATION: 98 % | HEART RATE: 84 BPM

## 2024-05-21 DIAGNOSIS — K13.79 ORAL PAIN: ICD-10-CM

## 2024-05-21 DIAGNOSIS — M79.671 RIGHT FOOT PAIN: ICD-10-CM

## 2024-05-21 PROCEDURE — 99284 EMERGENCY DEPT VISIT MOD MDM: CPT | Performed by: EMERGENCY MEDICINE

## 2024-05-21 PROCEDURE — 99283 EMERGENCY DEPT VISIT LOW MDM: CPT | Performed by: EMERGENCY MEDICINE

## 2024-05-21 RX ORDER — IBUPROFEN 800 MG/1
800 TABLET, FILM COATED ORAL EVERY 8 HOURS PRN
Qty: 60 TABLET | Refills: 0 | Status: SHIPPED | OUTPATIENT
Start: 2024-05-21 | End: 2024-06-10

## 2024-05-21 RX ORDER — NYSTATIN 100000/ML
500000 SUSPENSION, ORAL (FINAL DOSE FORM) ORAL 4 TIMES DAILY
Qty: 120 ML | Refills: 0 | Status: SHIPPED | OUTPATIENT
Start: 2024-05-21 | End: 2024-05-27

## 2024-05-21 ASSESSMENT — COLUMBIA-SUICIDE SEVERITY RATING SCALE - C-SSRS
2. HAVE YOU ACTUALLY HAD ANY THOUGHTS OF KILLING YOURSELF IN THE PAST MONTH?: NO
6. HAVE YOU EVER DONE ANYTHING, STARTED TO DO ANYTHING, OR PREPARED TO DO ANYTHING TO END YOUR LIFE?: NO
1. IN THE PAST MONTH, HAVE YOU WISHED YOU WERE DEAD OR WISHED YOU COULD GO TO SLEEP AND NOT WAKE UP?: NO

## 2024-05-21 NOTE — ED TRIAGE NOTES
Pt ambulatory to triage with c/o right foot pain & oral lesions. Pt states she has been seen for similar complaint a few months ago, her symptoms have not resolved. Pt worried something may be growing in RLE.      Triage Assessment (Adult)       Row Name 05/21/24 0918          Triage Assessment    Airway WDL WDL        Respiratory WDL    Respiratory WDL WDL        Skin Circulation/Temperature WDL    Skin Circulation/Temperature WDL WDL        Cardiac WDL    Cardiac WDL WDL        Peripheral/Neurovascular WDL    Peripheral Neurovascular WDL WDL        Cognitive/Neuro/Behavioral WDL    Cognitive/Neuro/Behavioral WDL WDL

## 2024-05-21 NOTE — ED PROVIDER NOTES
[facility]ERGENCY DEPARTMENT ([facility])    24       ED PROVIDER NOTE        History     Chief Complaint   Patient presents with    Foot Pain    Mouth Lesions     HPI  Juventino Dickerson is a 33 year old  female (3w 3d pregnant) with a past medical history significant for sarcoidosis who presents to the Emergency Department for evaluation of foot main and mouth lesions. Patient was seen here at the ED  for similar symptoms and her mouth pain was treated with a nystatin swish. She had an x-ray of her foot with no significant findings and was told to follow-up with PCP for care of possible plantar fasciitis. Patient presents today with concern of tongue and mouth pain when eating spicy or seasoned foods.  She has had the symptoms for several months still and since her previous visit to the ER.  She states this pain is localized at the front of her tongue and in her lower lip. She denies recent trauma around her mouth. She also reports right foot pain localized in her medial heel. She states this pain worsens when she is standing however, she also feels this pain when she is laying down. She states these symptoms have been ongoing for the past 6-10 months. She denies recent trauma in her right foot.  No fevers or chills, redness, warmth, or any other concerns.  No trauma.        Past Medical History  Past Medical History:   Diagnosis Date    Depressive disorder     History of shoulder dystocia in prior pregnancy     Postpartum depression     Pulmonary sarcoidosis (H24)      Past Surgical History:   Procedure Laterality Date    BRONCHOSCOPY RIDID OR FLEXIBLE W/ENDOBRONCHIAL ULTRASOUND GUIDED 3 OR MORE NODE STATIONS N/A 2020    Procedure: BRONCHOSCOPY, WITH BIOPSY OF 3 OR MORE LYMPH NODE STATIONS WITH ENDOBRONCHIAL ULTRASOUND GUIDANCE;  Surgeon: Kevin Kramer MD;  Location: [facility]     SECTION N/A 2021    Procedure:  SECTION;  Surgeon: Debbie Kumari,  MD;  [facility]     ibuprofen (ADVIL/MOTRIN) 800 MG tablet  nystatin (MYCOSTATIN) [id number] UNIT/ML suspension  acetaminophen (TYLENOL) 325 MG tablet  acetaminophen (TYLENOL) 500 MG tablet  albuterol (PROAIR HFA/PROVENTIL HFA/VENTOLIN HFA) 108 (90 Base) MCG/ACT inhaler  Benzocaine-Menthol (CEPACOL) 15-2.3 MG LOZG  fluticasone (FLONASE) 50 MCG/ACT nasal spray  oxyCODONE (ROXICODONE) 5 MG tablet  Prenatal Vit-Fe Fumarate-FA (PRENATAL MULTIVITAMIN W/IRON) 27-0.8 MG tablet  SENNA-docusate sodium (SENNA S) 8.6-50 MG tablet  vitamin C (ASCORBIC ACID) 250 MG tablet      No Known Allergies  Family History  Family History   Problem Relation Age of Onset    Diabetes Father      Social History   Social History     Tobacco Use    Smoking status: Never    Smokeless tobacco: Never   Substance Use Topics    Alcohol use: Not Currently    Drug use: Not Currently      Past medical history, past surgical history, medications, allergies, family history, and social history were reviewed with the patient. No additional pertinent items.      A complete review of systems was performed with pertinent positives and negatives noted in the HPI, and all other systems negative.    Physical Exam   BP: 93/57  Pulse: 84  Temp: 98.2  F (36.8  C)  Resp: 15  SpO2: 98 %  Physical Exam  General: Alert sitting on the chair in NAD   Head:  atraumatic   Mouth: Evidence of possible thrush, and 1 small aphthous ulcer  CV: RRR,  pulse in the affected limb with < 2  seconds cap refill to digits distally   Resp:  breathing comfortably   Back:  no T&L spine/back tenderness   Skin: no erythema or edema over extremity, no joint effusion,no warmth, no echymosis, no crepitance The integrity of the skin at this site has not been compromised.   Extremities: no deformity over the leg or foot, tenderness over the heel; full ROM of all AROM of all major joints without pain   Neuro: Awake alert; face  symmetrical; moving extremities x 4,   Lower Extremity: Strength 5/5  dorsiflexion,plantarflexion/EHL, 5/5 knee flexion/extension, Sensation  intact to light touch in for saphenous/sural/deep peroneal/superficial peroneal/and tibial nerves. Patient was  able to ambulate without difficulty        ED Course, Procedures, & Data           No results found for any visits on 05/21/24.  Medications - No data to display  Labs Ordered and Resulted from Time of ED Arrival to Time of ED Departure - No data to display  No orders to display          Critical care was not performed.     Medical Decision Making  Differential diagnosis of patient's right foot pain includes fracture, dislocation, musculoskeletal injury, cellulitis, plantars fasciitis, or other acute disease.  Given the chronicity, and the exam and history and findings, patient's presentation is most consistent with plantar fasciitis.  X-ray that I reviewed from previous visit with the same concern showed no evidence of new fracture or acute issues.  Patient has no redness or warmth on exam, no fevers to suggest cellulitis.  Strong pulse, normal CMS exam.  Discussed patient to rest, minimize repetitive movements .  She has supportive footwear.  Discussed stretching and NSAIDs.    In regards to patient's oral concern, she does a 1 small aphthous ulcer, but also evidence of possible thrush, so started nystatin.  And expressed that patient really needs to follow-up with her primary care doctor or ENT.  In the next few days.  Symptoms should be improving.  She has no mass or evidence of oral cancer or any other acute concerns on my exam.  But want to ensure that she has close follow-up.  Patient is in agreement with plan.    Patient discharged in good condition with questions answered. She was given [facility] discharge instructions and follow-up recommendations. Patient will return to the ED if symptoms worsen or she develops any of the concerning signs/symptoms as outlined in the EPIC d/c instructions. Otherwise she will follow up in clinic as  recommended in the d/c instructions.      The patient's presentation was of moderate complexity (2 or more stable chronic illnesses).    The patient's evaluation involved:  review of external note(s) from 3+ sources (see separate area of note for details)  ordering and/or review of 3+ test(s) in this encounter (see separate area of note for details)    The patient's management necessitated moderate risk (prescription drug management including medications given in the ED).    Assessment & Plan    (M79.671) Right foot pain  Comment: likely plantar fasciitis    (K13.79) Oral pain  Nystatin oral script    Plan: .Discharge to home with close follow up in Primary care clinic  Return to the ER with any worsening symptoms            I have reviewed the nursing notes. I have reviewed the findings, diagnosis, plan and need for follow up with the patient.    Discharge Medication List as of 5/21/2024  9:57 AM        START taking these medications    Details   nystatin (MYCOSTATIN) [id number] UNIT/ML suspension Take 5 mLs (500,000 Units) by mouth 4 times daily for 6 daysDisp-120 mL, [id number]             Final diagnoses:   Right foot pain - likely plantar fasciitis   Oral pain   I, MALGORZATA PRADO, am serving as a trained medical scribe to document services personally performed by Ita Saini MD, based on the provider's statements to me.      I, Ita Saini MD, was physically present and have reviewed and verified the accuracy of this note documented by MALGORZATA PRADO.    Ita Saini MD  [facility] EMERGENCY DEPARTMENT  5/21/2024           Ita Saini MD  05/21/24 1047

## 2024-05-21 NOTE — DISCHARGE INSTRUCTIONS
Thank you for choosing [facility] for your care. It was a pleasure taking care of you today in our Emergency Department.       Instructions from your doctor today:  Emergency Department (ED) testing is focused on the potential causes of your symptoms that are the most dangerous possibilities, and cannot cover every possibility. Based on the evaluation, it was deemed sufficiently safe to discharge and continue management through the clinics. Thus, follow-up is very important to assess for improvement/worsening, potential further testing, and potential treatment adjustments. If you were given opioid pain medications or other medications that can make you drowsy while in the ED, you should not drive for at least several hours and not until you feel completely back to normal.     Please make an appointment to follow up with:  - Your Primary Care Provider in 2-4 days  - If you do not have a primary care provider, you can be seen in follow-up and establish care by calling any of the clinics below:     - [facility] (phone: [phone])     - Primary Care / [facility] (phone: [phone])   - Have your clinic provider review the results from today's visit with you again, including any potential follow-up or additional testing that may be needed based on the results. Occasionally, incidental findings are found on later review by radiologists that may need follow-up.       If you have continued worsening symptoms, please return to the emergency department.

## 2024-09-10 ENCOUNTER — OFFICE VISIT (OUTPATIENT)
Dept: ORTHOPEDICS | Facility: CLINIC | Age: 34
End: 2024-09-10
Payer: MEDICARE

## 2024-09-10 DIAGNOSIS — M72.2 PLANTAR FASCIITIS, RIGHT: Primary | ICD-10-CM

## 2024-09-10 PROCEDURE — 99203 OFFICE O/P NEW LOW 30 MIN: CPT | Performed by: FAMILY MEDICINE

## 2024-09-10 NOTE — PROGRESS NOTES
CHIEF COMPLAINT:  RIght leg pain       HISTORY OF PRESENT ILLNESS  Ms. Dickerson is a pleasant 33 year old year old female who presents to clinic today with right foot pain.  Juventino explains that she has been experiencing right foot arch pain that can radiate up to her knee for about 1 year. No injury.    She has been seen and evaluated in the emergency department for this twice.  She states that x-rays were performed in February and unremarkable.  Additionally she was given crutches as well as pain medications which are only temporarily beneficial.  No active stretching or strengthening or treatment programs discussed with her thus far.    Onset: gradual  Location: right medial foot   Quality:  stabbing  Duration: 1 year  Severity: 10/10 at worst  Timing:constant - Unsure what worsens the pain  Modifying factors:  resting and non-use makes it better, movement and use makes it worse  Associated signs & symptoms: pain and tenderness  Previous similar pain: No  Treatments to date: Tylenol, ibuprofen, previously given crutches    Additional history: as documented    Review of Systems:  Have you recently had a a fever, chills, weight loss? No  Do you have any vision problems? No  Do you have any chest pain or edema? No  Do you have any shortness of breath or wheezing?  No  Do you have stomach problems? No  Do you have any numbness or focal weakness? No  Do you have diabetes? No  Do you have problems with bleeding or clotting? No  Do you have an rashes or other skin lesions? No    MEDICAL HISTORY  Patient Active Problem List   Diagnosis    Mediastinal mass    Lymphadenopathy, mediastinal    Sarcoidosis    Supervision of other high risk pregnancies, first trimester    Hx of shoulder dystocia in prior pregnancy, currently pregnant    History of postpartum hemorrhage, currently pregnant    LGSIL of cervix of undetermined significance    Vitamin D deficiency    Shoulder dystocia during labor and delivery, delivered     Adjustment disorder with depressed mood    Learning difficulty    Polyhydramnios    Excessive fetal growth affecting management of pregnancy in third trimester    Fetal macrosomia       Current Outpatient Medications   Medication Sig Dispense Refill    acetaminophen (TYLENOL) 325 MG tablet Take 3 tablets (975 mg) by mouth every 6 hours 50 tablet 0    acetaminophen (TYLENOL) 500 MG tablet Take 1-2 tablets (500-1,000 mg) by mouth every 6 hours as needed for mild pain 90 tablet 0    albuterol (PROAIR HFA/PROVENTIL HFA/VENTOLIN HFA) 108 (90 Base) MCG/ACT inhaler Inhale 2 puffs into the lungs every 6 hours as needed for shortness of breath / dyspnea or wheezing (Patient not taking: Reported on 7/2/2021) 1 Inhaler 0    Benzocaine-Menthol (CEPACOL) 15-2.3 MG LOZG Take 1 lozenge by mouth every 2 hours as needed (sore throat) 16 lozenge 0    fluticasone (FLONASE) 50 MCG/ACT nasal spray Spray 1 spray into both nostrils daily (Patient not taking: Reported on 3/19/2021) 11.1 mL 0    oxyCODONE (ROXICODONE) 5 MG tablet Take 1 tablet (5 mg) by mouth every 4 hours as needed for severe pain 12 tablet 0    Prenatal Vit-Fe Fumarate-FA (PRENATAL MULTIVITAMIN W/IRON) 27-0.8 MG tablet Take 1 tablet by mouth daily 30 tablet 0    SENNA-docusate sodium (SENNA S) 8.6-50 MG tablet Take 1 tablet by mouth 2 times daily as needed (constipation) 90 tablet 0    vitamin C (ASCORBIC ACID) 250 MG tablet Take 1 tablet (250 mg) by mouth daily (Patient not taking: Reported on 7/20/2021) 90 tablet 3       No Known Allergies    Family History   Problem Relation Age of Onset    Diabetes Father        Additional medical/Social/Surgical histories reviewed in EPIC and updated as appropriate.       PHYSICAL EXAM  There were no vitals taken for this visit.    General  - normal appearance, in no obvious distress  Musculoskeletal - right foot  - stance: normal gait without limp, pes planus bilateral  - inspection: no swelling or effusion, normal bone and joint  alignment, no obvious deformity  - palpation: tender at the medial calcaneal tubercle into proximal arch  - ROM: normal active and passive ROM of great and lesser toes, no pain with MT translation  - strength: 5/5 in all planes  Neuro  - no sensory or motor deficit, grossly normal coordination, normal muscle tone      IMAGING :     3 views right foot radiographs 2/19/2024 10:55 AM     History: right foot pain     Additional History from EMR: 2 weeks of right foot pain without  precipitating traumatic injury     Comparison: None     Findings:     Nonweightbearing AP, oblique, and lateral  views of the right foot  were obtained.      No acute osseous abnormality.       Lisfranc articulation alignment is congruent on these non-weight  bearing images.     Mild degenerative changes of first metatarsophalangeal joint.  Mild  hallux valgus.      Soft tissue is unremarkable.                                                                      Impression:  1. No acute osseous abnormality.  2. Mild hallux valgus and degenerative change of the first MTP joint.     I have personally reviewed the examination and initial interpretation  and I agree with the findings.     ELIEL WILSON MD (Joe)      ASSESSMENT & PLAN  Ms. Dickerson is a 33 year old year old female who presents to clinic today with chronic right foot pain at medial calcaneal tubercle extending into arch.    Pain has led to two prior ED visits, no active treatment plan was devised for her except for pain medications and offloading.    Diagnosis: Plantar fasciitis of right foot, pes planus of bilateral feet    - Start formal physical therapy for stretching, intrinsic foot/ankle strengthening  -Voltaren gel BID  -Superfeet orthotic inserts dispensed today, instructed on appropriate footwear  -Avoid barefoot walking and inappropriate footwear lacking support  -Consideration for CAM walker boot if no improvement noted  -Follow up 6-8 weeks    It was a pleasure  seeing Juventino today.    Can Jacques DO, CAQSM  Primary Care Sports Medicine

## 2024-09-10 NOTE — PATIENT INSTRUCTIONS
Plantar Fasciitis Education    WHAT IS PLANTAR FASCIITIS?    Plantar fasciitis is painful irritation of the tissue on the bottom of your foot between the ball of the foot and the heel. This tough tissue that supports the arch of your foot is called fascia.    WHAT IS THE CAUSE?    Plantar fasciitis can be caused by a number of things, like:    A lot of running, jumping, walking, or stair-climbing  Wearing high heels for long periods of time  Gaining weight  If you are a runner, you may get plantar fasciitis when you start running further during each workout or if you run more often. It can also happen if you start running on a different surface or in different terrain, or if your shoes are worn out and don't give enough cushioning for your heels.    If you wear high-heeled shoes, including western-style boots, the fascia can get shorter. You may then have pain when you stretch the fascia. This painful stretching might happen, for example, when you walk barefoot after getting out of bed in the morning.    If you gain weight, you may put more stress on your feet, especially if you walk a lot or  shoes with poor cushioning.    If the arches of your foot are unusually high or low, you are more likely to develop plantar fasciitis than if your arches are normal.    WHAT ARE THE SYMPTOMS?    The main symptom of plantar fasciitis is heel pain when you walk. You may also feel pain when you stand and possibly even when you are resting. This pain typically occurs first thing in the morning when you get out of bed and put your foot flat on the floor, stretching the fascia. The pain may lessen after you have been up for a while and walking, but then the pain may come back after you have been resting.    You may not have any pain when you are sleeping because the position of your feet during rest allows the fascia to shorten and relax.    HOW IS IT DIAGNOSED?    Your healthcare provider will ask about your symptoms,  activities, and medical history and examine your foot and ankle. You may have an X-ray of your heel.    HOW IS IT TREATED?    Give your painful heel lots of rest. You will need to change or stop doing the activities that cause pain until your foot has healed. You may need to stay completely off your foot for several days when the pain is severe.    Your healthcare provider may recommend stretching and strengthening exercises to help you heal. Exercises to make the foot stronger and to stretch the fascia are very important.    Your healthcare provider may recommend shoe inserts called arch supports or orthotics. You can buy them at a pharmacy or athletic shoe store or they can be custom made. Make sure the arch supports are firm. If you can easily bend them in half, they may be too flexible. These supports can be particularly helpful if you have flat feet or high arches. Wearing athletic shoes or heel cushions in both shoes may also help. Cushions are most helpful if you are overweight or an older adult.    A night splint may help keep the plantar fascia stretched while you are sleeping.    Your provider may give you a shot of steroid medicine. Your healthcare provider may have other treatments to suggest.    With treatment, plantar fasciitis may take up to several months to heal. You may find that the pain comes and goes. Talk to your healthcare provider if you do not feel improvement with treatment.    HOW CAN I TAKE CARE OF MYSELF?    To help relieve pain:    Rest your heel on an ice pack, gel pack, or package of frozen vegetables wrapped in a cloth every 3 to 4 hours for up to 20 minutes at a time.  Keep your foot up on a pillow when you sit or lie down.  Take nonprescription pain medicine, such as acetaminophen, ibuprofen, or naproxen. Read the label and take as directed. Nonsteroidal anti-inflammatory medicines (NSAIDs), such as ibuprofen or naproxen, may cause stomach bleeding and other problems. These risks  increase with age. Unless recommended by your healthcare provider, do not take an NSAID for more than 10 days.  Follow your healthcare provider's instructions, including any exercises recommended by your provider. Ask your provider:    How and when you will hear your test results  How long it will take to recover  What activities you should avoid, including how much you can lift, and when you can return to your normal activities  How to take care of yourself at home  What symptoms or problems you should watch for and what to do if you have them  Make sure you know when you should come back for a checkup.    HOW CAN I HELP PREVENT PLANTAR FASCIITIS?    The best way to prevent plantar fasciitis is to wear well-made shoes that fit your feet and give good arch support and cushioning. This is especially important if you exercise or walk a lot or stand for a long time on hard surfaces. Get new athletic shoes before your old shoes stop supporting and cushioning your feet.    You should also:    Avoid repeated jarring to the heel.  Keep a healthy weight.  Do leg and foot stretching exercises regularly.    Developed by [employer].  Published by [employer].  Copyright  2014 [employer] and/or one of its subsidiaries. All rights reserved.      Plantar Fasciitis Exercises:    You may start strengthening the muscles of your hip and stretching the muscles of your foot right away.    Prone hip extension: Lie on your stomach with your legs straight out behind you. Fold your arms under your head and rest your head on your arms. Draw your belly button in towards your spine and tighten your abdominal muscles. Tighten the buttocks and thigh muscles of the leg on your injured side and lift the leg off the floor about 8 inches. Keep your leg straight. Hold for 5 seconds. Then lower your leg and relax. Do 2 sets of 15.    Side-lying leg lift: Lie on your uninjured side. Tighten the front thigh muscles on your injured leg and  lift that leg 8 to 10 inches (20 to 25 centimeters) away from the other leg. Keep the leg straight and lower it slowly. Do 2 sets of 15.    *Frozen can roll: Roll your bare injured foot back and forth from your heel to your mid-arch over a frozen juice can. Repeat for 3 to 5 minutes. This exercise is particularly helpful if it is done first thing in the morning.    *Towel stretch: Sit on a hard surface with your injured leg stretched out in front of you. Loop a towel around your toes and the ball of your foot and pull the towel toward your body keeping your leg straight. Hold this position for 15 to 30 seconds and then relax. Repeat 3 times.    *Standing calf stretch: Stand facing a wall with your hands on the wall at about eye level. Keep your injured leg back with your heel on the floor. Keep the other leg forward with the knee bent. Turn your back foot slightly inward (as if you were pigeon-toed). Slowly lean into the wall until you feel a stretch in the back of your calf. Hold the stretch for 15 to 30 seconds. Return to the starting position. Repeat 3 times. Do this exercise several times each day.    *Seated plantar fascia stretch: Sit in a chair and cross the injured foot over the knee of your other leg. Place your fingers over the base of your toes and pull them back toward your shin until you feel a comfortable stretch in the arch of your foot. Hold 15 seconds and repeat 3 times.    *Plantar fascia massage: Sit in a chair and cross the injured foot over the knee of your other leg. Place your fingers over the base of the toes of your injured foot and pull your toes toward your shin until you feel a stretch in the arch of your foot. With your other hand, massage the bottom of your foot, moving from the heel toward your toes. Do this for 3 to 5 minutes. Start gently. Press harder on the bottom of your foot as you become able to tolerate more pressure.    Achilles stretch: Stand with the ball of one foot on a  stair. Reach for the step below with your heel until you feel a stretch in the arch of your foot. Hold this position for 15 to 30 seconds and then relax. Repeat 3 times.  After you have stretched the bottom muscles of your foot, you can do these exercises to start strengthening the muscles of your foot.    Towel pickup: With your heel on the ground,  a towel with your toes. Release. Repeat 10 to 20 times. When this gets easy, add more resistance by placing a book or small weight on the towel.    Balance and reach exercises: Stand next to a chair with your injured leg farther from the chair. The chair will provide support if you need it. Stand on the foot of your injured leg and bend your knee slightly. Try to raise the arch of this foot while keeping your big toe on the floor. Keep your foot in this position.  With the hand that is farther away from the chair, reach forward in front of you by bending at the waist. Avoid bending your knee any more as you do this. Repeat this 15 times. To make the exercise more challenging, reach farther in front of you. Do 2 sets of 15.    While keeping your arch raised, reach the hand that is farther away from the chair across your body toward the chair. The farther you reach, the more challenging the exercise. Do 2 sets of 15.    Heel raise: Stand behind a chair or counter with both feet flat on the floor. Using the chair or counter as a support, rise up onto your toes and hold for 5 seconds. Then slowly lower yourself down without holding onto the support. (It's OK to keep holding onto the support if you need to.) When this exercise becomes less painful, try doing this exercise while you are standing on the injured leg only. Repeat 15 times. Do 2 sets of 15. Rest 30 seconds between sets.    Developed by [employer].  Published by [employer].  Copyright  2014 [employer] and/or one of its subsidiaries. All rights reserved.

## 2024-09-10 NOTE — PROGRESS NOTES
DME FITTING    Relevant Diagnosis: Right plantar fasciitis   Superfeet inserts - B fit on patient's Bilateral feet.     Person(s) involved in teaching:   Patient    Brace was applied in standard Manner:  Yes  Brace fit well:  Yes  Patient reports brace to fit comfortably:  Yes    Education:   Patient shown self application and removal of brace: Yes  Patient shown how to adjust brace fit, if necessary: Yes  Patient educated on billing and return policy: Yes  Patient confirmed understanding when and how to contact clinic with concerns: Yes        Sandeep Howard M.A., LAT, ATC  Certified Athletic Trainer

## 2024-09-23 ENCOUNTER — THERAPY VISIT (OUTPATIENT)
Dept: PHYSICAL THERAPY | Facility: CLINIC | Age: 34
End: 2024-09-23
Payer: MEDICARE

## 2024-09-23 DIAGNOSIS — M72.2 PLANTAR FASCIITIS, RIGHT: ICD-10-CM

## 2024-09-23 PROCEDURE — 97110 THERAPEUTIC EXERCISES: CPT | Mod: GP | Performed by: PHYSICAL THERAPIST

## 2024-09-23 PROCEDURE — 97161 PT EVAL LOW COMPLEX 20 MIN: CPT | Mod: GP | Performed by: PHYSICAL THERAPIST

## 2024-09-23 ASSESSMENT — ACTIVITIES OF DAILY LIVING (ADL)
GETTING_INTO_OR_OUT_OF_A_CAR: QUITE A BIT OF DIFFICULTY
PERFORMING_HEAVY_ACTIVITIES_AROUND_YOUR_HOME: QUITE A BIT OF DIFFICULTY
RUNNING_ON_EVEN_GROUND: MODERATE DIFFICULTY
GETTING_INTO_AND_OUT_OF_A_BATH: QUITE A BIT OF DIFFICULTY
WALKING_A_MILE: MODERATE DIFFICULTY
ROLLING_OVER_IN_BED: QUITE A BIT OF DIFFICULTY
LEFS_RAW_SCORE: 0
RUNNING_ON_UNEVEN_GROUND: MODERATE DIFFICULTY
STANDING_FOR_1_HOUR: A LITTLE BIT OF DIFFICULTY
WALKING_BETWEEN_ROOMS: MODERATE DIFFICULTY
MAKING_SHARP_TURNS_WHILE_RUNNING_FAST: MODERATE DIFFICULTY
YOUR_USUAL_HOBBIES,_RECREATIONAL_OR_SPORTING_ACTIVITIES: QUITE A BIT OF DIFFICULTY
SITTING_FOR_1_HOUR: QUITE A BIT OF DIFFICULTY
GOING_UP_OR_DOWN_10_STAIRS: MODERATE DIFFICULTY
PLEASE_INDICATE_YOR_PRIMARY_REASON_FOR_REFERRAL_TO_THERAPY:: FOOT AND/OR ANKLE
WALKING_2_BLOCKS: MODERATE DIFFICULTY
SHOPPING: MODERATE DIFFICULTY
PUTTING_ON_YOUR_SHOES_OR_SOCKS: MODERATE DIFFICULTY
PERFORMING_LIGHT_ACTIVITIES_AROUND_YOUR_HOME: MODERATE DIFFICULTY
LIFTING_AN_OBJECT,_LIKE_A_BAG_OF_GROCERIES_FROM_THE_FLOOR: QUITE A BIT OF DIFFICULTY
ANY_OF_YOUR_USUAL_WORK,_HOUSEWORK_OR_SCHOOL_ACTIVITIES: A LITTLE BIT OF DIFFICULTY
LEFS_SCORE(%): 0
SQUATTING: QUITE A BIT OF DIFFICULTY

## 2024-09-23 NOTE — PROGRESS NOTES
PHYSICAL THERAPY EVALUATION  Type of Visit: Evaluation              Subjective   Patient reports to physical therapy for right foot pain that has been ongoing and worsening in the last year.  Has been wearing superfeet since provider visit and seems to be helping.  Pain is mostly in her arch, feels like sharp pain that is worse when she is up or lying down.  Nothing makes it feel better.        Presenting condition or subjective complaint: foot  Date of onset: 09/10/24 (provider referral date)    Relevant medical history: Depression; Foot drop   Dates & types of surgery:      Prior diagnostic imaging/testing results: X-ray     Prior therapy history for the same diagnosis, illness or injury: No      Prior Level of Function  Transfers: Independent  Ambulation: Independent  ADL: Independent  IADL: Driving, Finances, Housekeeping, Laundry, Meal preparation, Medication management, Work    Living Environment  Social support: Alone   Type of home: Apartment/condo   Stairs to enter the home: Yes       Ramp: No   Stairs inside the home: No       Help at home: None  Equipment owned: Crutches     Employment: No    Hobbies/Interests:      Patient goals for therapy:      Pain assessment: See objective evaluation for additional pain details     Objective   FOOT/ANKLE EVALUATION  PAIN: Pain Level at Rest: 5/10  Pain Level with Use: 8/10  Pain Location: foot   Pain Quality: Sharp  Pain is Exacerbated By: walking, standing  Pain is Relieved By: stretch and ankle circles, inserts   INTEGUMENTARY (edema, incisions): WFL  POSTURE:   GAIT:   Weightbearing Status:   Assistive Device(s):   Gait Deviations: Antalgic  Pronation increased R  BALANCE/PROPRIOCEPTION:   WEIGHT BEARING ALIGNMENT: Foot/Ankle WB Alignment:Calcaneal valgus R  NON-WEIGHTBEARING ALIGNMENT:    ROM:   (Degrees) Left AROM Left PROM  Right AROM Right PROM   Ankle Dorsiflexion 9  0    Ankle Plantarflexion 72  58    Ankle Inversion 25  28    Ankle Eversion 28  35    Great  Toe Flexion       Great Toe Extension       Pain:   End feel:     STRENGTH:   Pain: - none + mild ++ moderate +++ severe  Strength Scale: 0-5/5 Left Right   Ankle Dorsiflexion 5- 4   Ankle Plantarflexion     Ankle Inversion 5- 4   Ankle Eversion 4+ 4-   Great Toe Flexion 5 5   Great Toe Extension 5 5   Anterior Tibialis     Posterior Tibialis     Peroneals     Extensor Digitorum     Gastroc/Soleus       FLEXIBILITY: WFL  SPECIAL TESTS:   FUNCTIONAL TESTS:   PALPATION:  TTP to calcaneous, R plantar fascia   JOINT MOBILITY:  decreased TC joint mobility     Assessment & Plan   CLINICAL IMPRESSIONS  Medical Diagnosis: R plantar fasciitis    Treatment Diagnosis: R foot pain   Impression/Assessment: Patient is a 33 year old female with R foot complaints.  The following significant findings have been identified: Pain, Decreased ROM/flexibility, Decreased joint mobility, Decreased strength, Impaired gait, Impaired muscle performance, and Decreased activity tolerance. These impairments interfere with their ability to perform self care tasks, recreational activities, household mobility, and community mobility as compared to previous level of function.     Clinical Decision Making (Complexity):  Clinical Presentation: Stable/Uncomplicated  Clinical Presentation Rationale: based on medical and personal factors listed in PT evaluation  Clinical Decision Making (Complexity): Low complexity    PLAN OF CARE  Treatment Interventions:  Modalities: Dry Needling  Interventions: Manual Therapy, Neuromuscular Re-education, Therapeutic Activity, Therapeutic Exercise, Self-Care/Home Management    Long Term Goals     PT Goal 1  Goal Identifier: Ambulation  Goal Description: Patient will report ability to ambulate one mile with normalized gait, no AD, and no increase in pain to promote a healthy and active lifestyle as well as return to work fully.  Rationale: to maximize safety and independence within the community  Target Date:  12/16/24      Frequency of Treatment: every 1-2 weeks  Duration of Treatment: 12 weeks    Recommended Referrals to Other Professionals:  No further referral needed   Education Assessment:   Learner/Method: Patient;No Barriers to Learning    Risks and benefits of evaluation/treatment have been explained.   Patient/Family/caregiver agrees with Plan of Care.     Evaluation Time:     PT Eval, Low Complexity Minutes (47596): 15   Present: Not applicable     Signing Clinician: Ethel Diaz PT        [facility]                                                                                   OUTPATIENT PHYSICAL THERAPY      PLAN OF TREATMENT FOR OUTPATIENT REHABILITATION   Patient's Last Name, First Name, PATRICKJuventino Shaikh YOB: 1990   Provider's Name   [facility]   Medical Record No.  [id number]     Onset Date: 09/10/24 (provider referral date)  Start of Care Date: 09/23/24     Medical Diagnosis:  R plantar fasciitis      PT Treatment Diagnosis:  R foot pain Plan of Treatment  Frequency/Duration: every 1-2 weeks/ 12 weeks    Certification date from 09/23/24 to 12/16/24         See note for plan of treatment details and functional goals     Ethel Diaz PT                         I CERTIFY THE NEED FOR THESE SERVICES FURNISHED UNDER        THIS PLAN OF TREATMENT AND WHILE UNDER MY CARE     (Physician attestation of this document indicates review and certification of the therapy plan).              Referring Provider:  Can Jacques    Initial Assessment  See Epic Evaluation- Start of Care Date: 09/23/24

## 2024-09-29 PROBLEM — M72.2 PLANTAR FASCIITIS, RIGHT: Status: ACTIVE | Noted: 2024-09-29

## 2025-05-11 ENCOUNTER — HEALTH MAINTENANCE LETTER (OUTPATIENT)
Age: 35
End: 2025-05-11

## (undated) DEVICE — SOL NACL 0.9% IRRIG 1000ML BOTTLE 07138-09

## (undated) DEVICE — PACK C-SECTION LF PL15OTA83B

## (undated) DEVICE — SOL WATER IRRIG 1000ML BOTTLE 07139-09

## (undated) DEVICE — STRAP KNEE/BODY 31143004

## (undated) DEVICE — SU VICRYL 4-0 PS-2 18" UND J496G

## (undated) DEVICE — GLOVE ESTEEM POWDER FREE SMT 6.5  2D72PT65

## (undated) DEVICE — SU MONOCRYL 0 CT-1 36" Y346H

## (undated) DEVICE — SU MONOCRYL 4-0 PS-2 18" UND Y496G

## (undated) DEVICE — CATH TRAY FOLEY SURESTEP 16FR WDRAIN BAG STLK LATEX A300316A

## (undated) DEVICE — SU VICRYL 0 CT-1 36" J346H

## (undated) DEVICE — STOCKING SLEEVE COMPRESSION CALF LG

## (undated) DEVICE — DRSG STERI STRIP 1/2X4" R1547

## (undated) DEVICE — GLOVE PROTEXIS BLUE W/NEU-THERA 7.0  2D73EB70

## (undated) DEVICE — PREP CHLORAPREP 26ML TINTED ORANGE  260815

## (undated) RX ORDER — KETOROLAC TROMETHAMINE 30 MG/ML
INJECTION, SOLUTION INTRAMUSCULAR; INTRAVENOUS
Status: DISPENSED
Start: 2021-07-23

## (undated) RX ORDER — MORPHINE SULFATE 1 MG/ML
INJECTION, SOLUTION EPIDURAL; INTRATHECAL; INTRAVENOUS
Status: DISPENSED
Start: 2021-07-23

## (undated) RX ORDER — LIDOCAINE HYDROCHLORIDE 20 MG/ML
SOLUTION OROPHARYNGEAL
Status: DISPENSED
Start: 2020-11-24

## (undated) RX ORDER — LIDOCAINE HYDROCHLORIDE AND EPINEPHRINE 10; 10 MG/ML; UG/ML
INJECTION, SOLUTION INFILTRATION; PERINEURAL
Status: DISPENSED
Start: 2020-11-24

## (undated) RX ORDER — ONDANSETRON 2 MG/ML
INJECTION INTRAMUSCULAR; INTRAVENOUS
Status: DISPENSED
Start: 2021-07-23

## (undated) RX ORDER — FENTANYL CITRATE 50 UG/ML
INJECTION, SOLUTION INTRAMUSCULAR; INTRAVENOUS
Status: DISPENSED
Start: 2021-07-23

## (undated) RX ORDER — FENTANYL CITRATE 50 UG/ML
INJECTION, SOLUTION INTRAMUSCULAR; INTRAVENOUS
Status: DISPENSED
Start: 2020-11-24

## (undated) RX ORDER — LIDOCAINE HYDROCHLORIDE 10 MG/ML
INJECTION, SOLUTION EPIDURAL; INFILTRATION; INTRACAUDAL; PERINEURAL
Status: DISPENSED
Start: 2020-11-24

## (undated) RX ORDER — OXYTOCIN/0.9 % SODIUM CHLORIDE 30/500 ML
PLASTIC BAG, INJECTION (ML) INTRAVENOUS
Status: DISPENSED
Start: 2021-07-23

## (undated) RX ORDER — LIDOCAINE HYDROCHLORIDE 40 MG/ML
SOLUTION TOPICAL
Status: DISPENSED
Start: 2020-11-24